# Patient Record
Sex: FEMALE | Race: WHITE | NOT HISPANIC OR LATINO | URBAN - METROPOLITAN AREA
[De-identification: names, ages, dates, MRNs, and addresses within clinical notes are randomized per-mention and may not be internally consistent; named-entity substitution may affect disease eponyms.]

---

## 2017-01-30 ENCOUNTER — INPATIENT (INPATIENT)
Facility: HOSPITAL | Age: 82
LOS: 8 days | Discharge: NURSING, REHAB OR LONG-TERM CARE | End: 2017-02-08
Attending: INTERNAL MEDICINE | Admitting: INTERNAL MEDICINE
Payer: MEDICARE

## 2017-01-30 VITALS
DIASTOLIC BLOOD PRESSURE: 83 MMHG | SYSTOLIC BLOOD PRESSURE: 137 MMHG | HEART RATE: 78 BPM | RESPIRATION RATE: 16 BRPM | TEMPERATURE: 98 F | HEIGHT: 64 IN | WEIGHT: 160.06 LBS | OXYGEN SATURATION: 100 %

## 2017-01-30 LAB
ALBUMIN SERPL ELPH-MCNC: 2.8 G/DL — LOW (ref 3.3–5)
ALP SERPL-CCNC: 87 U/L — SIGNIFICANT CHANGE UP (ref 40–120)
ALT FLD-CCNC: 25 U/L — SIGNIFICANT CHANGE UP (ref 12–78)
ANION GAP SERPL CALC-SCNC: 7 MMOL/L — SIGNIFICANT CHANGE UP (ref 5–17)
APPEARANCE UR: (no result)
APTT BLD: 26.6 SEC — LOW (ref 27.5–37.4)
AST SERPL-CCNC: 25 U/L — SIGNIFICANT CHANGE UP (ref 15–37)
BACTERIA # UR AUTO: (no result)
BASOPHILS # BLD AUTO: 0 K/UL — SIGNIFICANT CHANGE UP (ref 0–0.2)
BASOPHILS NFR BLD AUTO: 0.5 % — SIGNIFICANT CHANGE UP (ref 0–2)
BILIRUB SERPL-MCNC: 0.5 MG/DL — SIGNIFICANT CHANGE UP (ref 0.2–1.2)
BILIRUB UR-MCNC: NEGATIVE — SIGNIFICANT CHANGE UP
BUN SERPL-MCNC: 17 MG/DL — SIGNIFICANT CHANGE UP (ref 7–23)
CALCIUM SERPL-MCNC: 8.5 MG/DL — SIGNIFICANT CHANGE UP (ref 8.5–10.1)
CHLORIDE SERPL-SCNC: 96 MMOL/L — SIGNIFICANT CHANGE UP (ref 96–108)
CO2 SERPL-SCNC: 34 MMOL/L — HIGH (ref 22–31)
COLOR SPEC: YELLOW — SIGNIFICANT CHANGE UP
CREAT SERPL-MCNC: 0.63 MG/DL — SIGNIFICANT CHANGE UP (ref 0.5–1.3)
DIFF PNL FLD: (no result)
EOSINOPHIL # BLD AUTO: 0.2 K/UL — SIGNIFICANT CHANGE UP (ref 0–0.5)
EOSINOPHIL NFR BLD AUTO: 2.1 % — SIGNIFICANT CHANGE UP (ref 0–6)
EPI CELLS # UR: (no result)
GLUCOSE SERPL-MCNC: 107 MG/DL — HIGH (ref 70–99)
GLUCOSE UR QL: NEGATIVE MG/DL — SIGNIFICANT CHANGE UP
HCT VFR BLD CALC: 32.6 % — LOW (ref 34.5–45)
HGB BLD-MCNC: 10.6 G/DL — LOW (ref 11.5–15.5)
INR BLD: 1.09 RATIO — SIGNIFICANT CHANGE UP (ref 0.88–1.16)
KETONES UR-MCNC: (no result)
LACTATE SERPL-SCNC: 0.9 MMOL/L — SIGNIFICANT CHANGE UP (ref 0.7–2)
LEUKOCYTE ESTERASE UR-ACNC: (no result)
LYMPHOCYTES # BLD AUTO: 1.6 K/UL — SIGNIFICANT CHANGE UP (ref 1–3.3)
LYMPHOCYTES # BLD AUTO: 19.8 % — SIGNIFICANT CHANGE UP (ref 13–44)
MCHC RBC-ENTMCNC: 30.1 PG — SIGNIFICANT CHANGE UP (ref 27–34)
MCHC RBC-ENTMCNC: 32.7 GM/DL — SIGNIFICANT CHANGE UP (ref 32–36)
MCV RBC AUTO: 92 FL — SIGNIFICANT CHANGE UP (ref 80–100)
MONOCYTES # BLD AUTO: 0.7 K/UL — SIGNIFICANT CHANGE UP (ref 0–0.9)
MONOCYTES NFR BLD AUTO: 8.8 % — SIGNIFICANT CHANGE UP (ref 2–14)
NEUTROPHILS # BLD AUTO: 5.6 K/UL — SIGNIFICANT CHANGE UP (ref 1.8–7.4)
NEUTROPHILS NFR BLD AUTO: 68.7 % — SIGNIFICANT CHANGE UP (ref 43–77)
NITRITE UR-MCNC: POSITIVE
PH UR: 5 — SIGNIFICANT CHANGE UP (ref 4.8–8)
PLATELET # BLD AUTO: 231 K/UL — SIGNIFICANT CHANGE UP (ref 150–400)
POTASSIUM SERPL-MCNC: 3.4 MMOL/L — LOW (ref 3.5–5.3)
POTASSIUM SERPL-SCNC: 3.4 MMOL/L — LOW (ref 3.5–5.3)
PROT SERPL-MCNC: 5.9 GM/DL — LOW (ref 6–8.3)
PROT UR-MCNC: 30 MG/DL
PROTHROM AB SERPL-ACNC: 12 SEC — SIGNIFICANT CHANGE UP (ref 10–13.1)
RBC # BLD: 3.54 M/UL — LOW (ref 3.8–5.2)
RBC # FLD: 14.2 % — SIGNIFICANT CHANGE UP (ref 10.3–14.5)
RBC CASTS # UR COMP ASSIST: (no result) /HPF (ref 0–4)
SODIUM SERPL-SCNC: 137 MMOL/L — SIGNIFICANT CHANGE UP (ref 135–145)
SP GR SPEC: 1.02 — SIGNIFICANT CHANGE UP (ref 1.01–1.02)
UROBILINOGEN FLD QL: NEGATIVE MG/DL — SIGNIFICANT CHANGE UP
WBC # BLD: 8.2 K/UL — SIGNIFICANT CHANGE UP (ref 3.8–10.5)
WBC # FLD AUTO: 8.2 K/UL — SIGNIFICANT CHANGE UP (ref 3.8–10.5)
WBC UR QL: >50

## 2017-01-30 PROCEDURE — 99285 EMERGENCY DEPT VISIT HI MDM: CPT

## 2017-01-30 PROCEDURE — 71010: CPT | Mod: 26

## 2017-01-30 RX ORDER — VANCOMYCIN HCL 1 G
VIAL (EA) INTRAVENOUS
Qty: 0 | Refills: 0 | Status: DISCONTINUED | OUTPATIENT
Start: 2017-01-31 | End: 2017-01-31

## 2017-01-30 RX ORDER — VANCOMYCIN HCL 1 G
VIAL (EA) INTRAVENOUS
Qty: 0 | Refills: 0 | Status: DISCONTINUED | OUTPATIENT
Start: 2017-01-30 | End: 2017-01-30

## 2017-01-30 RX ORDER — CEFTRIAXONE 500 MG/1
1 INJECTION, POWDER, FOR SOLUTION INTRAMUSCULAR; INTRAVENOUS EVERY 24 HOURS
Qty: 0 | Refills: 0 | Status: DISCONTINUED | OUTPATIENT
Start: 2017-01-31 | End: 2017-02-01

## 2017-01-30 RX ORDER — ESCITALOPRAM OXALATE 10 MG/1
10 TABLET, FILM COATED ORAL DAILY
Qty: 0 | Refills: 0 | Status: DISCONTINUED | OUTPATIENT
Start: 2017-01-31 | End: 2017-02-08

## 2017-01-30 RX ORDER — HEPARIN SODIUM 5000 [USP'U]/ML
5000 INJECTION INTRAVENOUS; SUBCUTANEOUS EVERY 8 HOURS
Qty: 0 | Refills: 0 | Status: DISCONTINUED | OUTPATIENT
Start: 2017-01-30 | End: 2017-01-31

## 2017-01-30 RX ORDER — CEFTRIAXONE 500 MG/1
1 INJECTION, POWDER, FOR SOLUTION INTRAMUSCULAR; INTRAVENOUS ONCE
Qty: 0 | Refills: 0 | Status: COMPLETED | OUTPATIENT
Start: 2017-01-30 | End: 2017-01-30

## 2017-01-30 RX ORDER — CEFAZOLIN SODIUM 1 G
1000 VIAL (EA) INJECTION ONCE
Qty: 0 | Refills: 0 | Status: COMPLETED | OUTPATIENT
Start: 2017-01-30 | End: 2017-01-30

## 2017-01-30 RX ORDER — CARVEDILOL PHOSPHATE 80 MG/1
6.25 CAPSULE, EXTENDED RELEASE ORAL EVERY 12 HOURS
Qty: 0 | Refills: 0 | Status: DISCONTINUED | OUTPATIENT
Start: 2017-01-30 | End: 2017-02-08

## 2017-01-30 RX ORDER — ACETAMINOPHEN 500 MG
650 TABLET ORAL EVERY 6 HOURS
Qty: 0 | Refills: 0 | Status: DISCONTINUED | OUTPATIENT
Start: 2017-01-30 | End: 2017-02-08

## 2017-01-30 RX ORDER — CEFTRIAXONE 500 MG/1
INJECTION, POWDER, FOR SOLUTION INTRAMUSCULAR; INTRAVENOUS
Qty: 0 | Refills: 0 | Status: DISCONTINUED | OUTPATIENT
Start: 2017-01-30 | End: 2017-02-01

## 2017-01-30 RX ORDER — ASPIRIN/CALCIUM CARB/MAGNESIUM 324 MG
81 TABLET ORAL DAILY
Qty: 0 | Refills: 0 | Status: DISCONTINUED | OUTPATIENT
Start: 2017-01-31 | End: 2017-02-01

## 2017-01-30 RX ADMIN — Medication 100 MILLIGRAM(S): at 17:30

## 2017-01-30 NOTE — H&P ADULT - SKIN
detailed exam As per Extremities Exam ~Severe Bilateral Pitting LE edema with areas of notable weeping, and erythema. secondary to edema.

## 2017-01-30 NOTE — ED PROVIDER NOTE - CONSTITUTIONAL, MLM
normal... Well appearing, well nourished, awake, alert, oriented to person, place, time/situation and in no apparent distress. Afebrile. Nontoxic.

## 2017-01-30 NOTE — ED PROVIDER NOTE - MUSCULOSKELETAL, MLM
severe bilateral pitting LE edema with weeping. areas of erythema secondary to edema. right heel with denuded skin as daughter states she popped a blood blister

## 2017-01-30 NOTE — ED ADULT NURSE NOTE - CHIEF COMPLAINT
The patient is a 82y Female complaining of swelling of legs. The patient is a 82y Female complaining of swelling of legs.weeping edema b/l LE

## 2017-01-30 NOTE — ED PROVIDER NOTE - CARE PLAN
Principal Discharge DX:	Pitting edema  Secondary Diagnosis:	Cellulitis of lower extremity, unspecified laterality

## 2017-01-30 NOTE — ED PROVIDER NOTE - PMH
Anxiety    Bronchitis    HTN (hypertension) Anxiety    HTN (hypertension)    Hyperlipidemia, unspecified hyperlipidemia type    Moderate persistent asthma without complication    NSTEMI (non-ST elevated myocardial infarction)  12/5/2016

## 2017-01-30 NOTE — H&P ADULT - NSHPPHYSICALEXAM_GEN_ALL_CORE
T(C): 36.8, Max: 36.8 (01-30 @ 20:10)  HR: 78 (78 - 78)  BP: 137/83 (137/83 - 137/83)  RR: 16 (16 - 16)  SpO2: 100% (100% - 100%)  Wt(kg): --

## 2017-01-30 NOTE — ED ADULT NURSE REASSESSMENT NOTE - COMFORT CARE
wait time explained/side rails up/repositioned/treatment delay explained/plan of care explained/warm blanket provided

## 2017-01-30 NOTE — H&P ADULT - NSHPLABSRESULTS_GEN_ALL_CORE
10.6   8.2   )-----------( 231      ( 2017 17:17 )             32.6     2017 17:17    137    |  96     |  17     ----------------------------<  107    3.4     |  34     |  0.63     Ca    8.5        2017 17:17    TPro  5.9    /  Alb  2.8    /  TBili  0.5    /  DBili  x      /  AST  25     /  ALT  25     /  AlkPhos  87     2017 17:17    PT/INR - ( 2017 17:17 )   PT: 12.0 sec;   INR: 1.09 ratio         PTT - ( 2017 17:17 )  PTT:26.6 sec  CAPILLARY BLOOD GLUCOSE    LIVER FUNCTIONS - ( 2017 17:17 )  Alb: 2.8 g/dL / Pro: 5.9 gm/dL / ALK PHOS: 87 U/L / ALT: 25 U/L / AST: 25 U/L / GGT: x           Urinalysis Basic - ( 2017 21:10 )    Color: Yellow / Appearance: very cloudy / S.025 / pH: x  Gluc: x / Ketone: Small  / Bili: Negative / Urobili: Negative mg/dL   Blood: x / Protein: 30 mg/dL / Nitrite: Positive   Leuk Esterase: Moderate / RBC: 6-10 /HPF / WBC >50   Sq Epi: x / Non Sq Epi: Moderate / Bacteria: Many

## 2017-01-30 NOTE — H&P ADULT - HISTORY OF PRESENT ILLNESS
81 y/o F PMHx significant for HTN, HLD, moderate persistent asthma, with recent hospitalization in 12/2016 where the patient was found to have a RSV Bronchiolitis with hypercapneic and hypoxemic respiratory failure requiring endotracheal intubation c/b a NSTEMI who was referred to the ED today from Dr. Ann' office for further evaluation of progressively worsening B/L LE swelling and erythema concerning for purulent cellulitis.

## 2017-01-30 NOTE — H&P ADULT - EXTREMITIES COMMENTS
~Severe Bilateral Pitting LE edema with areas of notable weeping, and erythema. secondary to edema.   ~Right Heel with area of denuded skin and surrounding erythema

## 2017-01-30 NOTE — ED PROVIDER NOTE - NS ED MD SCRIBE ATTENDING SCRIBE SECTIONS
PROGRESS NOTE/HISTORY OF PRESENT ILLNESS/REVIEW OF SYSTEMS/PHYSICAL EXAM/RESULTS/PAST MEDICAL/SURGICAL/SOCIAL HISTORY

## 2017-01-30 NOTE — H&P ADULT - PMH
Anxiety    HTN (hypertension)    Hyperlipidemia, unspecified hyperlipidemia type    Moderate persistent asthma without complication    NSTEMI (non-ST elevated myocardial infarction)  12/5/2016

## 2017-01-30 NOTE — ED PROVIDER NOTE - OBJECTIVE STATEMENT
83 y/o F with hx of anxiety, HTN on carvedilol, on 81mg ASA and nebulizer PRN sent from PMD Dr Castellano for evaluation of increasingg bilateral LE swelling and cellulitis for antibiotics and consult with vascular surgeon evaluation. Pt was admitted to  for bronchitis a few weeks ago and has since then been having increased LE swelling. Denies cough, SOB, CP, fevers.

## 2017-01-31 DIAGNOSIS — I82.4Z1 ACUTE EMBOLISM AND THROMBOSIS OF UNSPECIFIED DEEP VEINS OF RIGHT DISTAL LOWER EXTREMITY: ICD-10-CM

## 2017-01-31 DIAGNOSIS — L03.119 CELLULITIS OF UNSPECIFIED PART OF LIMB: ICD-10-CM

## 2017-01-31 DIAGNOSIS — J45.40 MODERATE PERSISTENT ASTHMA, UNCOMPLICATED: ICD-10-CM

## 2017-01-31 DIAGNOSIS — I10 ESSENTIAL (PRIMARY) HYPERTENSION: ICD-10-CM

## 2017-01-31 DIAGNOSIS — F41.9 ANXIETY DISORDER, UNSPECIFIED: ICD-10-CM

## 2017-01-31 DIAGNOSIS — T14.8 OTHER INJURY OF UNSPECIFIED BODY REGION: ICD-10-CM

## 2017-01-31 LAB
ALBUMIN SERPL ELPH-MCNC: 2.3 G/DL — LOW (ref 3.3–5)
ALP SERPL-CCNC: 70 U/L — SIGNIFICANT CHANGE UP (ref 40–120)
ALT FLD-CCNC: 18 U/L — SIGNIFICANT CHANGE UP (ref 12–78)
ANION GAP SERPL CALC-SCNC: 7 MMOL/L — SIGNIFICANT CHANGE UP (ref 5–17)
AST SERPL-CCNC: 20 U/L — SIGNIFICANT CHANGE UP (ref 15–37)
BILIRUB SERPL-MCNC: 0.5 MG/DL — SIGNIFICANT CHANGE UP (ref 0.2–1.2)
BUN SERPL-MCNC: 16 MG/DL — SIGNIFICANT CHANGE UP (ref 7–23)
CALCIUM SERPL-MCNC: 7.9 MG/DL — LOW (ref 8.5–10.1)
CHLORIDE SERPL-SCNC: 98 MMOL/L — SIGNIFICANT CHANGE UP (ref 96–108)
CO2 SERPL-SCNC: 32 MMOL/L — HIGH (ref 22–31)
CREAT SERPL-MCNC: 0.55 MG/DL — SIGNIFICANT CHANGE UP (ref 0.5–1.3)
GLUCOSE SERPL-MCNC: 122 MG/DL — HIGH (ref 70–99)
HCT VFR BLD CALC: 33.5 % — LOW (ref 34.5–45)
HGB BLD-MCNC: 11.1 G/DL — LOW (ref 11.5–15.5)
MAGNESIUM SERPL-MCNC: 1.7 MG/DL — LOW (ref 1.8–2.4)
MCHC RBC-ENTMCNC: 30.8 PG — SIGNIFICANT CHANGE UP (ref 27–34)
MCHC RBC-ENTMCNC: 33 GM/DL — SIGNIFICANT CHANGE UP (ref 32–36)
MCV RBC AUTO: 93.2 FL — SIGNIFICANT CHANGE UP (ref 80–100)
NT-PROBNP SERPL-SCNC: 3163 PG/ML — HIGH (ref 0–450)
PLATELET # BLD AUTO: 229 K/UL — SIGNIFICANT CHANGE UP (ref 150–400)
POTASSIUM SERPL-MCNC: 3.3 MMOL/L — LOW (ref 3.5–5.3)
POTASSIUM SERPL-SCNC: 3.3 MMOL/L — LOW (ref 3.5–5.3)
PROT SERPL-MCNC: 4.9 GM/DL — LOW (ref 6–8.3)
RBC # BLD: 3.59 M/UL — LOW (ref 3.8–5.2)
RBC # FLD: 14.4 % — SIGNIFICANT CHANGE UP (ref 10.3–14.5)
SODIUM SERPL-SCNC: 137 MMOL/L — SIGNIFICANT CHANGE UP (ref 135–145)
WBC # BLD: 17.6 K/UL — HIGH (ref 3.8–10.5)
WBC # FLD AUTO: 17.6 K/UL — HIGH (ref 3.8–10.5)

## 2017-01-31 PROCEDURE — 93970 EXTREMITY STUDY: CPT | Mod: 26

## 2017-01-31 RX ORDER — ALBUTEROL 90 UG/1
2 AEROSOL, METERED ORAL EVERY 6 HOURS
Qty: 0 | Refills: 0 | Status: DISCONTINUED | OUTPATIENT
Start: 2017-01-31 | End: 2017-02-08

## 2017-01-31 RX ORDER — POTASSIUM CHLORIDE 20 MEQ
40 PACKET (EA) ORAL ONCE
Qty: 0 | Refills: 0 | Status: COMPLETED | OUTPATIENT
Start: 2017-01-31 | End: 2017-01-31

## 2017-01-31 RX ORDER — ALPRAZOLAM 0.25 MG
0.12 TABLET ORAL EVERY 8 HOURS
Qty: 0 | Refills: 0 | Status: DISCONTINUED | OUTPATIENT
Start: 2017-01-31 | End: 2017-02-06

## 2017-01-31 RX ORDER — VANCOMYCIN HCL 1 G
1000 VIAL (EA) INTRAVENOUS EVERY 12 HOURS
Qty: 0 | Refills: 0 | Status: DISCONTINUED | OUTPATIENT
Start: 2017-01-31 | End: 2017-01-31

## 2017-01-31 RX ORDER — ENOXAPARIN SODIUM 100 MG/ML
80 INJECTION SUBCUTANEOUS EVERY 12 HOURS
Qty: 0 | Refills: 0 | Status: DISCONTINUED | OUTPATIENT
Start: 2017-01-31 | End: 2017-02-01

## 2017-01-31 RX ORDER — VANCOMYCIN HCL 1 G
750 VIAL (EA) INTRAVENOUS EVERY 12 HOURS
Qty: 0 | Refills: 0 | Status: DISCONTINUED | OUTPATIENT
Start: 2017-01-31 | End: 2017-02-02

## 2017-01-31 RX ORDER — IPRATROPIUM/ALBUTEROL SULFATE 18-103MCG
3 AEROSOL WITH ADAPTER (GRAM) INHALATION ONCE
Qty: 0 | Refills: 0 | Status: COMPLETED | OUTPATIENT
Start: 2017-01-31 | End: 2017-01-31

## 2017-01-31 RX ORDER — VANCOMYCIN HCL 1 G
1000 VIAL (EA) INTRAVENOUS ONCE
Qty: 0 | Refills: 0 | Status: COMPLETED | OUTPATIENT
Start: 2017-01-31 | End: 2017-01-31

## 2017-01-31 RX ADMIN — Medication 40 MILLIEQUIVALENT(S): at 11:02

## 2017-01-31 RX ADMIN — ESCITALOPRAM OXALATE 10 MILLIGRAM(S): 10 TABLET, FILM COATED ORAL at 11:06

## 2017-01-31 RX ADMIN — CEFTRIAXONE 100 GRAM(S): 500 INJECTION, POWDER, FOR SOLUTION INTRAMUSCULAR; INTRAVENOUS at 23:46

## 2017-01-31 RX ADMIN — Medication 150 MILLIGRAM(S): at 20:11

## 2017-01-31 RX ADMIN — CARVEDILOL PHOSPHATE 6.25 MILLIGRAM(S): 80 CAPSULE, EXTENDED RELEASE ORAL at 05:07

## 2017-01-31 RX ADMIN — Medication 250 MILLIGRAM(S): at 03:19

## 2017-01-31 RX ADMIN — Medication 81 MILLIGRAM(S): at 11:05

## 2017-01-31 RX ADMIN — Medication 0.12 MILLIGRAM(S): at 17:36

## 2017-01-31 RX ADMIN — CARVEDILOL PHOSPHATE 6.25 MILLIGRAM(S): 80 CAPSULE, EXTENDED RELEASE ORAL at 17:37

## 2017-01-31 RX ADMIN — CEFTRIAXONE 100 GRAM(S): 500 INJECTION, POWDER, FOR SOLUTION INTRAMUSCULAR; INTRAVENOUS at 01:03

## 2017-01-31 RX ADMIN — ENOXAPARIN SODIUM 80 MILLIGRAM(S): 100 INJECTION SUBCUTANEOUS at 17:37

## 2017-01-31 RX ADMIN — ENOXAPARIN SODIUM 80 MILLIGRAM(S): 100 INJECTION SUBCUTANEOUS at 05:07

## 2017-01-31 NOTE — CONSULT NOTE ADULT - SUBJECTIVE AND OBJECTIVE BOX
Outpatient Providers	PCP/Pulmonary: Dr. Castellano	  	PCP: Dr. Kennedy Pulmonary: Dr. Castellano	    Language:  · Patient/Family of Limited English Proficiency	No	      History of Present Illness:  Chief Complaint/Reason for Admission: B/L Lower Extremity Swelling	  History of Present Illness: 	  81 y/o F PMHx significant for HTN, HLD, moderate persistent asthma, with recent hospitalization in 2016 where the patient was found to have a RSV Bronchiolitis with hypercapneic and hypoxemic respiratory failure requiring endotracheal intubation c/b a NSTEMI who was referred to the ED today from Dr. Ann' office for further evaluation of progressively worsening B/L LE swelling and erythema concerning for purulent cellulitis.    Review of Systems:  Other Review of Systems: All other review of systems negative, except as noted in HPI	      Allergies and Intolerances:        Allergies:  	penicillin: Drug, Anaphylaxis  	penicillins: Drug Category, Unknown, Originally Entered as [Unknown] reaction to [Penicillins]    Home Medications:   * Outpatient Medication Status not yet specified    Patient History:   Past Medical History:  Anxiety    HTN (hypertension)    Hyperlipidemia, unspecified hyperlipidemia type    Moderate persistent asthma without complication    NSTEMI (non-ST elevated myocardial infarction)  2016.    Past Surgical History:  No significant past surgical history.    Family History:  No pertinent family history in first degree relatives.    Social History:  Social History (marital status, living situation, occupation, tobacco use, alcohol and drug use, and sexual history): Patient lives at home with her daughter. Former smoker but quit age 40. No ETOH or drug use.	    Tobacco Screening:  · Core Measure Site	No	  · Has the patient used tobacco in the past 30 days?	No	    Risk Assessment:   Present on Admission:  Deep Venous Thrombosis	no	  Pulmonary Embolus	no	  Urinary Catheter	no	  Central Venous Catheter/PICC Line	no	  Surgical Site Incision	no	  Pressure Ulcer(s)	no	    Heart Failure:  Does this patient have a history of or has been diagnosed with heart failure? no.      Physical Exam:  Physical Exam: T(C): 36.8, Max: 36.8 (-30 @ 20:10) HR: 78 (78 - 78) BP: 137/83 (137/83 - 137/83) RR: 16 (16 - 16) SpO2: 100% (100% - 100%) Wt(kg): --	    Physical Exam:  · Constitutional	detailed exam	  · Constitutional Details	no distress	  · Eyes	detailed exam	  · Eyes Details	PERRL; EOMI	  · Neck	detailed exam	  · Neck Details	supple	  · Breasts	not examined	  · Respiratory	detailed exam	  · Respiratory Details	airway patent; good air movement	  · Cardiovascular	detailed exam	  · Cardiovascular Details	regular rate and rhythm	  · Gastrointestinal	detailed exam	  · GI Normal	soft; nontender; no distention	  · Genitourinary	patient refused	  · Rectal	patient refused	  · Extremities	detailed exam	  · Extremities Details	pedal edema	  · Pedal Edema Severity	4+	  · Pedal Edema Type	pitting	  · Extremities Comments	~Severe Bilateral Pitting LE edema with areas of notable weeping, and erythema. secondary to edema.  ~Right Heel with area of denuded skin and surrounding erythema; 2+ femoral pulses bilaterally and both feet pink warm and well perfused	  · Neurological	detailed exam	  · Neurological Details	alert and oriented x 3	  · Skin	detailed exam	  · Skin Details	As per Extremities Exam ~Severe Bilateral Pitting LE edema with areas of notable weeping, and erythema. secondary to edema.	      Labs and Results:  Labs, Radiology, Cardiology, and Other Results: 10.6  8.2   )-----------( 231      ( 2017 17:17 )            32.6   2017 17:17  137    |  96     |  17    ----------------------------<  107   3.4     |  34     |  0.63   Ca    8.5        2017 17:17  TPro  5.9    /  Alb  2.8    /  TBili  0.5    /  DBili  x      /  AST  25     /  ALT  25     /  AlkPhos  87     2017 17:17  PT/INR - ( 2017 17:17 )   PT: 12.0 sec;   INR: 1.09 ratio      PTT - ( 2017 17:17 )  PTT:26.6 sec CAPILLARY BLOOD GLUCOSE  LIVER FUNCTIONS - ( 2017 17:17 ) Alb: 2.8 g/dL / Pro: 5.9 gm/dL / ALK PHOS: 87 U/L / ALT: 25 U/L / AST: 25 U/L / GGT: x         Urinalysis Basic - ( 2017 21:10 )  Color: Yellow / Appearance: very cloudy / S.025 / pH: x Gluc: x / Ketone: Small  / Bili: Negative / Urobili: Negative mg/dL  Blood: x / Protein: 30 mg/dL / Nitrite: Positive  Leuk Esterase: Moderate / RBC: 6-10 /HPF / WBC >50  Sq Epi: x / Non Sq Epi: Moderate / Bacteria: Many

## 2017-01-31 NOTE — PROGRESS NOTE ADULT - SUBJECTIVE AND OBJECTIVE BOX
CHIEF COMPLAINT: progressively worsening B/L LE swelling and erythema concerning for purulent cellulitis.            REVIEW OF SYSTEMS:    CONSTITUTIONAL: No weakness, fevers or chills  EYES/ENT: No visual changes;  No vertigo or throat pain   NECK: No pain or stiffness  RESPIRATORY: No cough, wheezing, hemoptysis; No shortness of breath  CARDIOVASCULAR: No chest pain or palpitations  GASTROINTESTINAL: No abdominal or epigastric pain. No nausea, vomiting, or hematemesis; No diarrhea or constipation. No melena or hematochezia.  GENITOURINARY: No dysuria, frequency or hematuria  NEUROLOGICAL: No numbness or weakness  SKIN: No itching, burning, rashes, or lesions   All other review of systems is negative unless indicated above    Vital Signs Last 24 Hrs  T(C): 36.2, Max: 36.8 ( @ 20:10)  T(F): 97.2, Max: 98.3 ( @ 20:10)  HR: 93 (65 - 93)  BP: 129/58 (109/66 - 137/83)  BP(mean): --  RR: 20 (16 - 20)  SpO2: 93% (93% - 100%)          I&O's Summary    I & Os for current day (as of 2017 15:20)  =============================================  IN: 0 ml / OUT: 1 ml / NET: -1 ml      CAPILLARY BLOOD GLUCOSE      PHYSICAL EXAM:    Constitutional: NAD, awake and alert, well-developed  HEENT: PERR, EOMI, Normal Hearing, MMM  Neck: Soft and supple, No LAD, No JVD  Respiratory: Breath sounds are clear bilaterally, No wheezing, rales or rhonchi  Cardiovascular: S1 and S2, regular rate and rhythm, no Murmurs, gallops or rubs  Gastrointestinal: Bowel Sounds present, soft, nontender, nondistended, no guarding, no rebound  Extremities: bilateral pitting edema to BLE  Vascular: 2+ peripheral pulses  Neurological: A/O x 3, no focal deficits  Musculoskeletal: 5/5 strength b/l upper and lower extremities  Skin: No rashes    MEDICATIONS:  MEDICATIONS  (STANDING):  escitalopram 10milliGRAM(s) Oral daily  carvedilol 6.25milliGRAM(s) Oral every 12 hours  cefTRIAXone   IVPB  IV Intermittent   cefTRIAXone   IVPB 1Gram(s) IV Intermittent every 24 hours  aspirin  chewable 81milliGRAM(s) Oral daily  vancomycin  IVPB  IV Intermittent   vancomycin  IVPB 1000milliGRAM(s) IV Intermittent every 12 hours  enoxaparin Injectable 80milliGRAM(s) SubCutaneous every 12 hours      LABS: All Labs Reviewed:                        10.6   8.2   )-----------( 231      ( 2017 17:17 )             32.6     2017 06:55    137    |  98     |  16     ----------------------------<  122    3.3     |  32     |  0.55     Ca    7.9        2017 06:55  Mg     1.7       2017 06:55    TPro  4.9    /  Alb  2.3    /  TBili  0.5    /  DBili  x      /  AST  20     /  ALT  18     /  AlkPhos  70     2017 06:55    PT/INR - ( 2017 17:17 )   PT: 12.0 sec;   INR: 1.09 ratio         PTT - ( 2017 17:17 )  PTT:26.6 sec    Urinalysis Basic - ( 2017 21:10 )    Color: Yellow / Appearance: very cloudy / S.025 / pH: x  Gluc: x / Ketone: Small  / Bili: Negative / Urobili: Negative mg/dL   Blood: x / Protein: 30 mg/dL / Nitrite: Positive   Leuk Esterase: Moderate / RBC: 6-10 /HPF / WBC >50   Sq Epi: x / Non Sq Epi: Moderate / Bacteria: Many              EXAM:  US DPLX LWR EXT VEINS COMPL BI                            PROCEDURE DATE:  2017        INTERPRETATION:  BILATERAL LOWER EXTREMITY VENOUS ULTRASOUND    INDICATION:  Bilateral lower extremity swelling.    TECHNIQUE: Grayscale, color and spectral Doppler evaluation of the   bilateral lower extremities with graded compression maneuvers was   performed.    COMPARISON: None.     FINDINGS:   Normal waveforms and compressibility are demonstrated in the common   femoral, femoral, and popliteal veins of the left lower extremity. There   is normal waveform and compressibility in the right common femoral and   femoral veins. There is partially occlusive thrombus in the right   popliteal vein.    There is right calf edema.    IMPRESSION:    Positive for DVT in the right popliteal vein.    Negative for DVT in the left leg.    Urinalysis Basic - ( 2017 21:10 )

## 2017-01-31 NOTE — CONSULT NOTE ADULT - ASSESSMENT
bilateral lower extremity edema with R DVT; previous rectus sheath hematoma awaiting CT of abdomen to determine resolution    continue antibiotics    treatment for lymphedema as out patient

## 2017-01-31 NOTE — PROGRESS NOTE ADULT - PROBLEM SELECTOR PLAN 2
~cont. LMWH  ~patient with history of rectal sheath hematoma on previous admission from heparin VTE prophylaxis  - will check CT if hematoma has resolved  - Dr Knight spoke with daughter about oral anticoagulation vs IVC filter given patient history of spontaneous bleed on heparin SQ ~cont. LMWH  ~patient with history of rectal sheath hematoma on previous admission from heparin VTE prophylaxis  - will check CT if hematoma has resolved  -Plan for D/C Lovenox if hematoma present on CT  - Dr Knight spoke with daughter about oral anticoagulation vs IVC filter given patient history of spontaneous bleed on heparin SQ

## 2017-01-31 NOTE — PROGRESS NOTE ADULT - ASSESSMENT
83 y/o F PMHx significant for HTN, HLD, moderate persistent asthma, with recent hospitalization in 12/2016 where the patient was found to have a RSV Bronchiolitis with hypercapneic and hypoxemic respiratory failure requiring endotracheal intubation c/b a NSTEMI who was referred to the ED today from Dr. Ann' office for further evaluation of progressively worsening B/L LE swelling and erythema concerning for purulent cellulitis.  Workup showed Right popliteal DVT- patient was then started on lovenox therapeutic dose.

## 2017-01-31 NOTE — CONSULT NOTE ADULT - SUBJECTIVE AND OBJECTIVE BOX
Patient is a 81 y/o old  Female who presents with a chief complaint of cellulitis (2017 01:53)    HPI:  81 y/o Female with h/o  HTN, HLD, CAD s/p NSTEMI, moderate asthma, recent hospitalization in 2016 for RSV Bronchiolitis with hypercapneic and hypoxemic respiratory failure that required endotracheal intubation was admitted on  progressively worsening B/L LE swelling and erythema. (2017 21:57)      PMH: as above  PSH: as above  Meds: per reconcilation sheet, noted below  MEDICATIONS  (STANDING):  escitalopram 10milliGRAM(s) Oral daily  carvedilol 6.25milliGRAM(s) Oral every 12 hours  cefTRIAXone   IVPB  IV Intermittent   cefTRIAXone   IVPB 1Gram(s) IV Intermittent every 24 hours  aspirin  chewable 81milliGRAM(s) Oral daily  enoxaparin Injectable 80milliGRAM(s) SubCutaneous every 12 hours  vancomycin  IVPB 750milliGRAM(s) IV Intermittent every 12 hours    MEDICATIONS  (PRN):  acetaminophen   Tablet 650milliGRAM(s) Oral every 6 hours PRN For Temp greater than 38 C (100.4 F)    Allergies    penicillin (Anaphylaxis)  penicillins (Unknown)    Intolerances      Social: no smoking, no alcohol, no illegal drugs; no recent travel, no exposure to TB  FAMILY HISTORY:  No pertinent family history in first degree relatives    ROS: the patient denies fever, no chills, no HA, no dizziness, no sore throat, no blurry vision, no CP, no palpitations, no SOB, no cough, no abdominal pain, no diarrhea, no N/V, no dysuria, has leg pain, no claudication, no rash, no joint aches, no rectal pain or bleeding, no night sweats  Vital Signs Last 24 Hrs  T(C): 36.2, Max: 36.8 ( @ 20:10)  T(F): 97.2, Max: 98.3 ( @ 20:10)  HR: 93 (65 - 93)  BP: 129/58 (109/66 - 137/83)  BP(mean): --  RR: 20 (16 - 20)  SpO2: 93% (93% - 100%)  Daily Height in cm: 162.56 (2017 20:10)    Daily   Constitutional: frail looking  HEENT: NC/AT, EOMI, PERRLA  Neck: supple  Back: no tenderness  Respiratory: clear  Cardiovascular: S1S2 regular, no murmurs  Abdomen: soft, not tender, not distended, positive BS  Genitourinary: deferred  Rectal: deferred  Musculoskeletal: no muscle tenderness, no joint swelling or tenderness  Extremities: has pedal edema and tenderness  Neurological: AxOx3, moving all extremities, no focal deficits  Skin: no rashes                          10.6   8.2   )-----------( 231      ( 2017 17:17 )             32.6     2017 06:55    137    |  98     |  16     ----------------------------<  122    3.3     |  32     |  0.55     Ca    7.9        2017 06:55  Mg     1.7       2017 06:55    TPro  4.9    /  Alb  2.3    /  TBili  0.5    /  DBili  x      /  AST  20     /  ALT  18     /  AlkPhos  70     2017 06:55     LIVER FUNCTIONS - ( 2017 06:55 )  Alb: 2.3 g/dL / Pro: 4.9 gm/dL / ALK PHOS: 70 U/L / ALT: 18 U/L / AST: 20 U/L / GGT: x           Urinalysis Basic - ( 2017 21:10 )    Color: Yellow / Appearance: very cloudy / S.025 / pH: x  Gluc: x / Ketone: Small  / Bili: Negative / Urobili: Negative mg/dL   Blood: x / Protein: 30 mg/dL / Nitrite: Positive   Leuk Esterase: Moderate / RBC: 6-10 /HPF / WBC >50   Sq Epi: x / Non Sq Epi: Moderate / Bacteria: Many    [Patient is a 82y old  Female who presents with a chief complaint of cellulitis (2017 01:53)    HPI:  81 y/o F PMHx significant for HTN, HLD, moderate persistent asthma, with recent hospitalization in 2016 where the patient was found to have a RSV Bronchiolitis with hypercapneic and hypoxemic respiratory failure requiring endotracheal intubation c/b a NSTEMI who was referred to the ED today from Dr. Ann' office for further evaluation of progressively worsening B/L LE swelling and erythema concerning for purulent cellulitis. (2017 21:57)      PMH: as above  PSH: as above  Meds: per reconcilation sheet, noted below  MEDICATIONS  (STANDING):  escitalopram 10milliGRAM(s) Oral daily  carvedilol 6.25milliGRAM(s) Oral every 12 hours  cefTRIAXone   IVPB  IV Intermittent   cefTRIAXone   IVPB 1Gram(s) IV Intermittent every 24 hours  aspirin  chewable 81milliGRAM(s) Oral daily  enoxaparin Injectable 80milliGRAM(s) SubCutaneous every 12 hours  vancomycin  IVPB 750milliGRAM(s) IV Intermittent every 12 hours    MEDICATIONS  (PRN):  acetaminophen   Tablet 650milliGRAM(s) Oral every 6 hours PRN For Temp greater than 38 C (100.4 F)    Allergies    penicillin (Anaphylaxis)  penicillins (Unknown)    Intolerances      Social: no smoking, no alcohol, no illegal drugs; no recent travel, no exposure to TB  FAMILY HISTORY:  No pertinent family history in first degree relatives    ROS: the patient denies fever, no chills, no HA, no dizziness, no sore throat, no blurry vision, no CP, no palpitations, no SOB, no cough, no abdominal pain, no diarrhea, no N/V, no dysuria, no leg pain, no claudication, no rash, no joint aches, no rectal pain or bleeding, no night sweats  Vital Signs Last 24 Hrs  T(C): 36.2, Max: 36.8 ( @ 20:10)  T(F): 97.2, Max: 98.3 ( @ 20:10)  HR: 93 (65 - 93)  BP: 129/58 (109/66 - 137/83)  BP(mean): --  RR: 20 (16 - 20)  SpO2: 93% (93% - 100%)  Daily Height in cm: 162.56 (2017 20:10)    Daily   Constitutional: frail looking  HEENT: NC/AT, EOMI, PERRLA  Neck: supple  Back: no tenderness  Respiratory: clear  Cardiovascular: S1S2 regular, no murmurs  Abdomen: soft, not tender, not distended, positive BS  Genitourinary: deferred  Rectal: deferred  Musculoskeletal: no muscle tenderness, no joint swelling or tenderness  Extremities: no pedal edema  Neurological: AxOx3, moving all extremities, no focal deficits  Skin: no rashes                          10.6   8.2   )-----------( 231      ( 2017 17:17 )             32.6     2017 06:55    137    |  98     |  16     ----------------------------<  122    3.3     |  32     |  0.55     Ca    7.9        2017 06:55  Mg     1.7       2017 06:55    TPro  4.9    /  Alb  2.3    /  TBili  0.5    /  DBili  x      /  AST  20     /  ALT  18     /  AlkPhos  70     2017 06:55     LIVER FUNCTIONS - ( 2017 06:55 )  Alb: 2.3 g/dL / Pro: 4.9 gm/dL / ALK PHOS: 70 U/L / ALT: 18 U/L / AST: 20 U/L / GGT: x           Urinalysis Basic - ( 2017 21:10 )    Color: Yellow / Appearance: very cloudy / S.025 / pH: x .Gluc: x / Ketone: Small  / Bili: Negative / Urobili: Negative mg/dL   Blood: x / Protein: 30 mg/dL / Nitrite: Positive   Leuk Esterase: Moderate / RBC: 6-10 /HPF / WBC >50   Sq Epi: x / Non Sq Epi: Moderate / Bacteria: Many      Radiology:    Venous US: Positive for DVT in the right popliteal vein.    Negative for DVT in the left leg.      CXR: Clear lungs. No effusions or pneumothorax. Patient is a 83 y/o old  Female who presents with a chief complaint of cellulitis (2017 01:53)    HPI:  83 y/o Female with h/o  HTN, HLD, CAD s/p NSTEMI, moderate asthma, recent hospitalization in 2016 for RSV Bronchiolitis with hypercapneic and hypoxemic respiratory failure that required endotracheal intubation was admitted on  progressively worsening B/L LE swelling and erythema. (2017 21:57)      PMH: as above  PSH: as above  Meds: per reconcilation sheet, noted below  MEDICATIONS  (STANDING):  escitalopram 10milliGRAM(s) Oral daily  carvedilol 6.25milliGRAM(s) Oral every 12 hours  cefTRIAXone   IVPB  IV Intermittent   cefTRIAXone   IVPB 1Gram(s) IV Intermittent every 24 hours  aspirin  chewable 81milliGRAM(s) Oral daily  enoxaparin Injectable 80milliGRAM(s) SubCutaneous every 12 hours  vancomycin  IVPB 750milliGRAM(s) IV Intermittent every 12 hours    MEDICATIONS  (PRN):  acetaminophen   Tablet 650milliGRAM(s) Oral every 6 hours PRN For Temp greater than 38 C (100.4 F)    Allergies    penicillin (Anaphylaxis)  penicillins (Unknown)    Intolerances      Social: no smoking, no alcohol, no illegal drugs; no recent travel, no exposure to TB  FAMILY HISTORY:  No pertinent family history in first degree relatives    ROS: the patient denies fever, no chills, no HA, no dizziness, no sore throat, no blurry vision, no CP, no palpitations, no SOB, no cough, no abdominal pain, no diarrhea, no N/V, no dysuria, has leg pain, no claudication, no rash, no joint aches, no rectal pain or bleeding, no night sweats  Vital Signs Last 24 Hrs  T(C): 36.2, Max: 36.8 ( @ 20:10)  T(F): 97.2, Max: 98.3 ( @ 20:10)  HR: 93 (65 - 93)  BP: 129/58 (109/66 - 137/83)  BP(mean): --  RR: 20 (16 - 20)  SpO2: 93% (93% - 100%)  Daily Height in cm: 162.56 (2017 20:10)    Daily   Constitutional: frail looking  HEENT: NC/AT, EOMI, PERRLA  Neck: supple  Back: no tenderness  Respiratory: clear  Cardiovascular: S1S2 regular, no murmurs  Abdomen: soft, not tender, not distended, positive BS  Genitourinary: deferred  Rectal: deferred  Musculoskeletal: no muscle tenderness, no joint swelling or tenderness  Extremities: has pedal edema and tenderness; Left anterior shin erythema, edema and broken skin with scant yellow discharge  Neurological: AxOx3, moving all extremities, no focal deficits  Skin: leg rashe                          10.6   8.2   )-----------( 231      ( 2017 17:17 )             32.6     2017 06:55    137    |  98     |  16     ----------------------------<  122    3.3     |  32     |  0.55     Ca    7.9        2017 06:55  Mg     1.7       2017 06:55    TPro  4.9    /  Alb  2.3    /  TBili  0.5    /  DBili  x      /  AST  20     /  ALT  18     /  AlkPhos  70     2017 06:55     LIVER FUNCTIONS - ( 2017 06:55 )  Alb: 2.3 g/dL / Pro: 4.9 gm/dL / ALK PHOS: 70 U/L / ALT: 18 U/L / AST: 20 U/L / GGT: x           Urinalysis Basic - ( 2017 21:10 )    Color: Yellow / Appearance: very cloudy / S.025 / pH: x  Gluc: x / Ketone: Small  / Bili: Negative / Urobili: Negative mg/dL   Blood: x / Protein: 30 mg/dL / Nitrite: Positive   Leuk Esterase: Moderate / RBC: 6-10 /HPF / WBC >50   Sq Epi: x / Non Sq Epi: Moderate / Bacteria: Many    [Patient is a 82y old  Female who presents with a chief complaint of cellulitis (2017 01:53)    HPI:  83 y/o F PMHx significant for HTN, HLD, moderate persistent asthma, with recent hospitalization in 2016 where the patient was found to have a RSV Bronchiolitis with hypercapneic and hypoxemic respiratory failure requiring endotracheal intubation c/b a NSTEMI who was referred to the ED today from Dr. Ann' office for further evaluation of progressively worsening B/L LE swelling and erythema concerning for purulent cellulitis. (2017 21:57)      PMH: as above  PSH: as above  Meds: per reconcilation sheet, noted below  MEDICATIONS  (STANDING):  escitalopram 10milliGRAM(s) Oral daily  carvedilol 6.25milliGRAM(s) Oral every 12 hours  cefTRIAXone   IVPB  IV Intermittent   cefTRIAXone   IVPB 1Gram(s) IV Intermittent every 24 hours  aspirin  chewable 81milliGRAM(s) Oral daily  enoxaparin Injectable 80milliGRAM(s) SubCutaneous every 12 hours  vancomycin  IVPB 750milliGRAM(s) IV Intermittent every 12 hours    MEDICATIONS  (PRN):  acetaminophen   Tablet 650milliGRAM(s) Oral every 6 hours PRN For Temp greater than 38 C (100.4 F)    Allergies    penicillin (Anaphylaxis)  penicillins (Unknown)    Intolerances      Social: no smoking, no alcohol, no illegal drugs; no recent travel, no exposure to TB  FAMILY HISTORY:  No pertinent family history in first degree relatives    ROS: the patient denies fever, no chills, no HA, no dizziness, no sore throat, no blurry vision, no CP, no palpitations, no SOB, no cough, no abdominal pain, no diarrhea, no N/V, no dysuria, no leg pain, no claudication, no rash, no joint aches, no rectal pain or bleeding, no night sweats  Vital Signs Last 24 Hrs  T(C): 36.2, Max: 36.8 ( @ 20:10)  T(F): 97.2, Max: 98.3 ( @ 20:10)  HR: 93 (65 - 93)  BP: 129/58 (109/66 - 137/83)  BP(mean): --  RR: 20 (16 - 20)  SpO2: 93% (93% - 100%)  Daily Height in cm: 162.56 (2017 20:10)    Daily   Constitutional: frail looking  HEENT: NC/AT, EOMI, PERRLA  Neck: supple  Back: no tenderness  Respiratory: clear  Cardiovascular: S1S2 regular, no murmurs  Abdomen: soft, not tender, not distended, positive BS  Genitourinary: deferred  Rectal: deferred  Musculoskeletal: no muscle tenderness, no joint swelling or tenderness  Extremities: no pedal edema  Neurological: AxOx3, moving all extremities, no focal deficits  Skin: no rashes                          10.6   8.2   )-----------( 231      ( 2017 17:17 )             32.6     2017 06:55    137    |  98     |  16     ----------------------------<  122    3.3     |  32     |  0.55     Ca    7.9        2017 06:55  Mg     1.7       2017 06:55    TPro  4.9    /  Alb  2.3    /  TBili  0.5    /  DBili  x      /  AST  20     /  ALT  18     /  AlkPhos  70     2017 06:55     LIVER FUNCTIONS - ( 2017 06:55 )  Alb: 2.3 g/dL / Pro: 4.9 gm/dL / ALK PHOS: 70 U/L / ALT: 18 U/L / AST: 20 U/L / GGT: x           Urinalysis Basic - ( 2017 21:10 )    Color: Yellow / Appearance: very cloudy / S.025 / pH: x .Gluc: x / Ketone: Small  / Bili: Negative / Urobili: Negative mg/dL   Blood: x / Protein: 30 mg/dL / Nitrite: Positive   Leuk Esterase: Moderate / RBC: 6-10 /HPF / WBC >50   Sq Epi: x / Non Sq Epi: Moderate / Bacteria: Many      Radiology:    Venous US: Positive for DVT in the right popliteal vein.    Negative for DVT in the left leg.      CXR: Clear lungs. No effusions or pneumothorax.

## 2017-01-31 NOTE — PROGRESS NOTE ADULT - PROBLEM SELECTOR PLAN 6
- history of rectal sheath hematoma on previous admission ~a month ago while on heparin SQ  - will check CT abdomen to check for resolution  - monitor CBC

## 2017-01-31 NOTE — PROGRESS NOTE ADULT - ATTENDING COMMENTS
Patient seen and examined with ALBAN Haines.  Agree with physical exam and assessment and plan.  - RLE acute DVT - case d/w pt and family - dtr Carolina who is RN at [facility] still deciding on what to do as far as long term anticoagulation.  d/w her plan for VKA vs NOAC vs IVC filter and d/w her r/b/a of each.  given recent rectus sheath hematoma will obtain CT to ensure resolution since pt was started on lovenox for DVT to make sure it can be continued. if still present will need to stop and place IVC filter.   - LLE cellulitis - spoke with mary ann will continue vanco and rocephin day 2  - chronic systolic dysfunction - consult cardio dr. oliveira

## 2017-01-31 NOTE — CONSULT NOTE ADULT - ASSESSMENT
83 y/o Female with h/o  HTN, HLD, CAD s/p NSTEMI, moderate asthma, recent hospitalization in 12/2016 for RSV Bronchiolitis with hypercapneic and hypoxemic respiratory failure that required endotracheal intubation was admitted on 1/31 progressively worsening B/L LE swelling and erythema.    1. B/l LE cellulitis. 83 y/o Female with h/o HTN, HLD, CAD s/p NSTEMI, moderate asthma, recent hospitalization in 12/2016 for RSV Bronchiolitis with hypercapneic and hypoxemic respiratory failure that required endotracheal intubation was admitted on 1/31 progressively worsening B/L LE swelling and erythema.    1. LLE cellulitis. Probable underlying chronic LE dermatitis. Left leg DVT. Allergy to PCN.  -obtain BC x 2  -start vancomycin 750 mg IV q12h and ceftriaxone 1 gm IV qd  -reason for abx use and side effects reviewed with patient  -monitor temps  -f/u CBC  -elevate legs  -supportive care  2. Other issues: HTN, HLD, CAD s/p NSTEMI, moderate asthma  -care per medicine

## 2017-02-01 LAB
-  AMIKACIN: SIGNIFICANT CHANGE UP
-  AMPICILLIN/SULBACTAM: SIGNIFICANT CHANGE UP
-  AMPICILLIN: SIGNIFICANT CHANGE UP
-  AZTREONAM: SIGNIFICANT CHANGE UP
-  CEFAZOLIN: SIGNIFICANT CHANGE UP
-  CEFEPIME: SIGNIFICANT CHANGE UP
-  CEFOXITIN: SIGNIFICANT CHANGE UP
-  CEFTAZIDIME: SIGNIFICANT CHANGE UP
-  CEFTRIAXONE: SIGNIFICANT CHANGE UP
-  CIPROFLOXACIN: SIGNIFICANT CHANGE UP
-  ERTAPENEM: SIGNIFICANT CHANGE UP
-  GENTAMICIN: SIGNIFICANT CHANGE UP
-  IMIPENEM: SIGNIFICANT CHANGE UP
-  LEVOFLOXACIN: SIGNIFICANT CHANGE UP
-  MEROPENEM: SIGNIFICANT CHANGE UP
-  NITROFURANTOIN: SIGNIFICANT CHANGE UP
-  PIPERACILLIN/TAZOBACTAM: SIGNIFICANT CHANGE UP
-  TOBRAMYCIN: SIGNIFICANT CHANGE UP
-  TRIMETHOPRIM/SULFAMETHOXAZOLE: SIGNIFICANT CHANGE UP
ANION GAP SERPL CALC-SCNC: 6 MMOL/L — SIGNIFICANT CHANGE UP (ref 5–17)
BUN SERPL-MCNC: 22 MG/DL — SIGNIFICANT CHANGE UP (ref 7–23)
CALCIUM SERPL-MCNC: 8.1 MG/DL — LOW (ref 8.5–10.1)
CHLORIDE SERPL-SCNC: 98 MMOL/L — SIGNIFICANT CHANGE UP (ref 96–108)
CO2 SERPL-SCNC: 33 MMOL/L — HIGH (ref 22–31)
CREAT SERPL-MCNC: 0.67 MG/DL — SIGNIFICANT CHANGE UP (ref 0.5–1.3)
CULTURE RESULTS: SIGNIFICANT CHANGE UP
GLUCOSE SERPL-MCNC: 108 MG/DL — HIGH (ref 70–99)
HCT VFR BLD CALC: 29 % — LOW (ref 34.5–45)
HGB BLD-MCNC: 9.4 G/DL — LOW (ref 11.5–15.5)
INR BLD: 1.3 RATIO — HIGH (ref 0.88–1.16)
MCHC RBC-ENTMCNC: 30.6 PG — SIGNIFICANT CHANGE UP (ref 27–34)
MCHC RBC-ENTMCNC: 32.5 GM/DL — SIGNIFICANT CHANGE UP (ref 32–36)
MCV RBC AUTO: 94.3 FL — SIGNIFICANT CHANGE UP (ref 80–100)
METHOD TYPE: SIGNIFICANT CHANGE UP
ORGANISM # SPEC MICROSCOPIC CNT: SIGNIFICANT CHANGE UP
ORGANISM # SPEC MICROSCOPIC CNT: SIGNIFICANT CHANGE UP
PLATELET # BLD AUTO: 186 K/UL — SIGNIFICANT CHANGE UP (ref 150–400)
POTASSIUM SERPL-MCNC: 3.7 MMOL/L — SIGNIFICANT CHANGE UP (ref 3.5–5.3)
POTASSIUM SERPL-SCNC: 3.7 MMOL/L — SIGNIFICANT CHANGE UP (ref 3.5–5.3)
PROTHROM AB SERPL-ACNC: 14.3 SEC — HIGH (ref 10–13.1)
RBC # BLD: 3.08 M/UL — LOW (ref 3.8–5.2)
RBC # FLD: 14.5 % — SIGNIFICANT CHANGE UP (ref 10.3–14.5)
SODIUM SERPL-SCNC: 137 MMOL/L — SIGNIFICANT CHANGE UP (ref 135–145)
SPECIMEN SOURCE: SIGNIFICANT CHANGE UP
WBC # BLD: 10.7 K/UL — HIGH (ref 3.8–10.5)
WBC # FLD AUTO: 10.7 K/UL — HIGH (ref 3.8–10.5)

## 2017-02-01 PROCEDURE — 74176 CT ABD & PELVIS W/O CONTRAST: CPT | Mod: 26

## 2017-02-01 RX ORDER — CEFTRIAXONE 500 MG/1
1 INJECTION, POWDER, FOR SOLUTION INTRAMUSCULAR; INTRAVENOUS EVERY 24 HOURS
Qty: 0 | Refills: 0 | Status: DISCONTINUED | OUTPATIENT
Start: 2017-02-01 | End: 2017-02-01

## 2017-02-01 RX ORDER — MEROPENEM 1 G/30ML
1000 INJECTION INTRAVENOUS EVERY 8 HOURS
Qty: 0 | Refills: 0 | Status: COMPLETED | OUTPATIENT
Start: 2017-02-01 | End: 2017-02-01

## 2017-02-01 RX ORDER — MAGNESIUM OXIDE 400 MG ORAL TABLET 241.3 MG
400 TABLET ORAL ONCE
Qty: 0 | Refills: 0 | Status: COMPLETED | OUTPATIENT
Start: 2017-02-01 | End: 2017-02-01

## 2017-02-01 RX ORDER — FUROSEMIDE 40 MG
40 TABLET ORAL DAILY
Qty: 0 | Refills: 0 | Status: DISCONTINUED | OUTPATIENT
Start: 2017-02-01 | End: 2017-02-07

## 2017-02-01 RX ORDER — POTASSIUM CHLORIDE 20 MEQ
40 PACKET (EA) ORAL ONCE
Qty: 0 | Refills: 0 | Status: COMPLETED | OUTPATIENT
Start: 2017-02-01 | End: 2017-02-01

## 2017-02-01 RX ORDER — LISINOPRIL 2.5 MG/1
2.5 TABLET ORAL DAILY
Qty: 0 | Refills: 0 | Status: DISCONTINUED | OUTPATIENT
Start: 2017-02-01 | End: 2017-02-08

## 2017-02-01 RX ADMIN — ESCITALOPRAM OXALATE 10 MILLIGRAM(S): 10 TABLET, FILM COATED ORAL at 11:41

## 2017-02-01 RX ADMIN — Medication 150 MILLIGRAM(S): at 05:22

## 2017-02-01 RX ADMIN — Medication 81 MILLIGRAM(S): at 11:41

## 2017-02-01 RX ADMIN — CARVEDILOL PHOSPHATE 6.25 MILLIGRAM(S): 80 CAPSULE, EXTENDED RELEASE ORAL at 17:36

## 2017-02-01 RX ADMIN — Medication 150 MILLIGRAM(S): at 17:36

## 2017-02-01 RX ADMIN — Medication 40 MILLIEQUIVALENT(S): at 14:22

## 2017-02-01 RX ADMIN — Medication 0.12 MILLIGRAM(S): at 01:17

## 2017-02-01 RX ADMIN — MAGNESIUM OXIDE 400 MG ORAL TABLET 400 MILLIGRAM(S): 241.3 TABLET ORAL at 14:17

## 2017-02-01 RX ADMIN — CEFTRIAXONE 100 GRAM(S): 500 INJECTION, POWDER, FOR SOLUTION INTRAMUSCULAR; INTRAVENOUS at 14:25

## 2017-02-01 RX ADMIN — Medication 3 MILLILITER(S): at 20:35

## 2017-02-01 RX ADMIN — CARVEDILOL PHOSPHATE 6.25 MILLIGRAM(S): 80 CAPSULE, EXTENDED RELEASE ORAL at 05:23

## 2017-02-01 RX ADMIN — Medication 40 MILLIGRAM(S): at 14:28

## 2017-02-01 RX ADMIN — ENOXAPARIN SODIUM 80 MILLIGRAM(S): 100 INJECTION SUBCUTANEOUS at 05:22

## 2017-02-01 NOTE — CONSULT NOTE ADULT - SUBJECTIVE AND OBJECTIVE BOX
HPI:  83 y/o F PMHx significant for HTN, HLD, moderate persistent asthma, with recent hospitalization in 2016 where the patient was found to have a RSV Bronchiolitis with hypercapneic and hypoxemic respiratory failure requiring endotracheal intubation, NSTEMI  during the last admission and underwent left heart cath which showed nonobstructive CAD presented with cellulitis.    Pt c/o SOB and LAO which she attributes to her asthma.  Pedal edema which her daughter says that is better now compared to before.    PAST MEDICAL & SURGICAL HISTORY:  NSTEMI (non-ST elevated myocardial infarction): 2016  Hyperlipidemia, unspecified hyperlipidemia type  Moderate persistent asthma without complication  Bronchitis  Anxiety  HTN (hypertension)  No significant past surgical history      PREVIOUS DIAGNOSTIC TESTING:      ECHO FINDINGS: - LVEF 40%.    STRESS FINDINGS:    CATHETERIZATION FINDINGS: - non obsturctive CAD.    MEDICATIONS  (STANDING):  escitalopram 10milliGRAM(s) Oral daily  carvedilol 6.25milliGRAM(s) Oral every 12 hours  cefTRIAXone   IVPB  IV Intermittent   cefTRIAXone   IVPB 1Gram(s) IV Intermittent every 24 hours  aspirin  chewable 81milliGRAM(s) Oral daily  enoxaparin Injectable 80milliGRAM(s) SubCutaneous every 12 hours  vancomycin  IVPB 750milliGRAM(s) IV Intermittent every 12 hours  ALBUTerol    90 MICROgram(s) HFA Inhaler 2Puff(s) Inhalation every 6 hours  ALBUTerol/ipratropium for Nebulization. 3milliLiter(s) Nebulizer once  potassium chloride    Tablet ER 40milliEquivalent(s) Oral once  magnesium oxide 400milliGRAM(s) Oral once    MEDICATIONS  (PRN):  acetaminophen   Tablet 650milliGRAM(s) Oral every 6 hours PRN For Temp greater than 38 C (100.4 F)  ALPRAZolam 0.125milliGRAM(s) Oral every 8 hours PRN anxiety      FAMILY HISTORY:  No family history of premature CAD or SCD.        SOCIAL HISTORY:  non smoker, no alcohol use          Vital Signs Last 24 Hrs  T(C): 36.9, Max: 37.1 ( @ 16:49)  T(F): 98.5, Max: 98.8 ( @ 16:49)  HR: 86 (86 - 101)  BP: 90/62 (90/62 - 141/75)  BP(mean): --  RR: 20 (20 - 22)  SpO2: 98% (96% - 100%)        INTERPRETATION OF TELEMETRY:    ECG:no ekg on chart, ordered one    I&O's Detail      LABS:                        9.4    10.7  )-----------( 186      ( 2017 07:28 )             29.0     2017 07:28    137    |  98     |  22     ----------------------------<  108    3.7     |  33     |  0.67     Ca    8.1        2017 07:28  Mg     1.7       2017 06:55    TPro  4.9    /  Alb  2.3    /  TBili  0.5    /  DBili  x      /  AST  20     /  ALT  18     /  AlkPhos  70     2017 06:55        PT/INR - ( 2017 07:28 )   PT: 14.3 sec;   INR: 1.30 ratio         PTT - ( 2017 17:17 )  PTT:26.6 sec  Urinalysis Basic - ( 2017 21:10 )    Color: Yellow / Appearance: very cloudy / S.025 / pH: x  Gluc: x / Ketone: Small  / Bili: Negative / Urobili: Negative mg/dL   Blood: x / Protein: 30 mg/dL / Nitrite: Positive   Leuk Esterase: Moderate / RBC: 6-10 /HPF / WBC >50   Sq Epi: x / Non Sq Epi: Moderate / Bacteria: Many      I&O's Summary    BNPSerum Pro-Brain Natriuretic Peptide: 3163 pg/mL ( @ 17:29)    RADIOLOGY & ADDITIONAL STUDIES:

## 2017-02-01 NOTE — PROGRESS NOTE ADULT - PROBLEM SELECTOR PLAN 6
- history of rectal sheath hematoma on previous admission ~a month ago while on heparin SQ  - will check CT abdomen to check for resolution  --CT-Pos for Rectal Sheath Hematoma ,   -Monitor Pt/INR,CBC  -Plan do D/C Lovenox and Place IVF  -F/u Cardio consult - history of rectal sheath hematoma on previous admission ~a month ago while on heparin SQ  - will check CT abdomen to check for resolution  - stop lovenox  --CT-Pos for Rectal Sheath Hematoma ,   -Monitor Pt/INR,CBC  -Plan do D/C Lovenox and Place IVF  -F/u Cardio consult

## 2017-02-01 NOTE — PROGRESS NOTE ADULT - ATTENDING COMMENTS
Patient seen and examined with ALBAN Haines.  Agree with physical exam and assessment and plan.  - RLE acute DVT - case d/w pt and family - dtr Carolina who is RN at [facility] still deciding on what to do as far as long term anticoagulation.  d/w her plan for VKA vs NOAC vs IVC filter and d/w her r/b/a of each.  given recent rectus sheath hematoma will obtain CT to ensure resolution since pt was started on lovenox for DVT to make sure it can be continued. if still present will need to stop and place IVC filter.   - LLE cellulitis - spoke with mary ann will continue vanco and rocephin day 2  - chronic systolic dysfunction - consult cardio dr. oliveira Patient seen and examined with ALBAN Haines.  Agree with physical exam and assessment and plan.  - RLE acute DVT - case d/w pt and family - dtr Carolina who is RN at [facility] still deciding on what to do as far as long term anticoagulation.  d/w her plan for VKA vs NOAC vs IVC filter and d/w her r/b/a of each.  given recent rectus sheath hematoma will obtain CT abd obtained which still shows hematoma (decreasing in size), will place IVC filter and d/w dr correa and she will speak with dr morris.    - LLE cellulitis - spoke with mary ann will continue vanco and rocephin day 3  - acute on chronic systolic dysfunction - consult cardio dr. correa and add lasix 40 mg iv daily, continue coreg, low dose ACEI and close follow up with dr correa as outpt for CHF mgmt. monitor BMP and mg and keep K ~ 4 and mg  ~ 2    daughter updated by dr correa

## 2017-02-01 NOTE — PROGRESS NOTE ADULT - PROBLEM SELECTOR PLAN 2
~cont. LMWH  ~patient with history of rectal sheath hematoma on previous admission from heparin VTE prophylaxis  - will check CT if hematoma has resolved  -Plan for D/C Lovenox if hematoma present on CT  - Dr Knight spoke with daughter about oral anticoagulation vs IVC filter given patient history of spontaneous bleed on heparin SQ ~cont. LMWH  ~patient with history of rectal sheath hematoma on previous admission from heparin - and f/u CT shows heamtoma thereforw will stop lovenox and place IVC filter given bleeding risk and daughter updated and d/w dr correa  - will check CT if hematoma has resolved  -Plan for D/C Lovenox if hematoma present on CT  - Dr Knight spoke with daughter about oral anticoagulation vs IVC filter given patient history of spontaneous bleed on heparin SQ

## 2017-02-01 NOTE — PROGRESS NOTE ADULT - SUBJECTIVE AND OBJECTIVE BOX
Overnight received a call from RN for critical value, UC: + for MDR E. Coli. Pt is currently on IV Vanco and Ceftriaxone. Urine Cx and Sensitivity report shows E.Coli resistance to Ceftriaxone, will d/c ceftriaxone and switch it to IV Meropenem, Will give IV Meropenem stat one dose. ID to f/u in AM.     D/w Night ZEE

## 2017-02-01 NOTE — PROGRESS NOTE ADULT - SUBJECTIVE AND OBJECTIVE BOX
Patient is a 81 y/o old  Female who presents with a chief complaint of cellulitis (2017 01:53)    HPI:  81 y/o Female with h/o  HTN, HLD, CAD s/p NSTEMI, moderate asthma, recent hospitalization in 2016 for RSV Bronchiolitis with hypercapneic and hypoxemic respiratory failure that required endotracheal intubation was admitted on  progressively worsening B/L LE swelling and erythema. (2017 21:57)    Alert and verbal  Left shin tenderness  No fever or chills    MEDICATIONS  (STANDING):  escitalopram 10milliGRAM(s) Oral daily  carvedilol 6.25milliGRAM(s) Oral every 12 hours  cefTRIAXone   IVPB  IV Intermittent   cefTRIAXone   IVPB 1Gram(s) IV Intermittent every 24 hours  vancomycin  IVPB 750milliGRAM(s) IV Intermittent every 12 hours  ALBUTerol    90 MICROgram(s) HFA Inhaler 2Puff(s) Inhalation every 6 hours  ALBUTerol/ipratropium for Nebulization. 3milliLiter(s) Nebulizer once  potassium chloride    Tablet ER 40milliEquivalent(s) Oral once  magnesium oxide 400milliGRAM(s) Oral once  furosemide   Injectable 40milliGRAM(s) IV Push daily  lisinopril 2.5milliGRAM(s) Oral daily    MEDICATIONS  (PRN):  acetaminophen   Tablet 650milliGRAM(s) Oral every 6 hours PRN For Temp greater than 38 C (100.4 F)  ALPRAZolam 0.125milliGRAM(s) Oral every 8 hours PRN anxiety      Vital Signs Last 24 Hrs  T(C): 36.9, Max: 37.1 ( @ 16:49)  T(F): 98.5, Max: 98.8 ( @ 16:49)  HR: 86 (86 - 101)  BP: 90/62 (90/62 - 141/75)  BP(mean): --  RR: 20 (20 - 22)  SpO2: 98% (96% - 100%)    Physical Exam:      IConstitutional: frail looking  HEENT: NC/AT, EOMI, PERRLA  Neck: supple  Back: no tenderness  Respiratory: clear  Cardiovascular: S1S2 regular, no murmurs  Abdomen: soft, not tender, not distended, positive BS  Genitourinary: deferred  Rectal: deferred  Musculoskeletal: no muscle tenderness, no joint swelling or tenderness  Extremities: has pedal edema and tenderness; Left anterior shin erythema, edema and broken skin with scant yellow discharge  Neurological: AxOx3, moving all extremities, no focal deficits  Skin: leg rashe    Labs:                        9.4    10.7  )-----------( 186      ( 2017 07:28 )             29.0     2017 07:28    137    |  98     |  22     ----------------------------<  108    3.7     |  33     |  0.67     Ca    8.1        2017 07:28  Mg     1.7       2017 06:55    TPro  4.9    /  Alb  2.3    /  TBili  0.5    /  DBili  x      /  AST  20     /  ALT  18     /  AlkPhos  70     2017 06:55           Cultures:                       10.6   8.2   )-----------( 231      ( 2017 17:17 )             32.6     2017 06:55    137    |  98     |  16     ----------------------------<  122    3.3     |  32     |  0.55     Ca    7.9        2017 06:55  Mg     1.7       2017 06:55    TPro  4.9    /  Alb  2.3    /  TBili  0.5    /  DBili  x      /  AST  20     /  ALT  18     /  AlkPhos  70     2017 06:55     LIVER FUNCTIONS - ( 2017 06:55 )  Alb: 2.3 g/dL / Pro: 4.9 gm/dL / ALK PHOS: 70 U/L / ALT: 18 U/L / AST: 20 U/L / GGT: x           Urinalysis Basic - ( 2017 21:10 )    Color: Yellow / Appearance: very cloudy / S.025 / pH: x  Gluc: x / Ketone: Small  / Bili: Negative / Urobili: Negative mg/dL   Blood: x / Protein: 30 mg/dL / Nitrite: Positive   Leuk Esterase: Moderate / RBC: 6-10 /HPF / WBC >50   Sq Epi: x / Non Sq Epi: Moderate / Bacteria: Many    Venous doppler: Negative for DVT in the left leg.      CXR: Clear lungs. No effusions or pneumothorax.

## 2017-02-01 NOTE — PROGRESS NOTE ADULT - SUBJECTIVE AND OBJECTIVE BOX
CHIEF COMPLAINT: progressively worsening B/L LE swelling and erythema concerning for purulent cellulitis.      HPI :  83 y/o F PMHx significant for HTN, HLD, moderate persistent asthma, with recent hospitalization in 2016 where the patient was found to have a RSV Bronchiolitis with hypercapneic and hypoxemic respiratory failure requiring endotracheal intubation c/b a NSTEMI who was referred to the ED today from Dr. Ann' office for further evaluation of progressively worsening B/L LE swelling and erythema concerning for purulent cellulitis.  Workup showed Right popliteal DVT- patient was then started on lovenox therapeutic dose.     - Pt anxious given Alprazolam , b/l Wheezing , given Albuterol tx HS   - Pt has improved SOB. No new complaints         REVIEW OF SYSTEMS:    CONSTITUTIONAL: No weakness, fevers or chills  EYES/ENT: No visual changes;  No vertigo or throat pain   NECK: No pain or stiffness  RESPIRATORY:No cough,sob, wheezing, hemoptysis  CARDIOVASCULAR: No chest pain or palpitations  GASTROINTESTINAL: No abdominal or epigastric pain. No nausea, vomiting, or hematemesis; No diarrhea or constipation. No melena or hematochezia.  GENITOURINARY: No dysuria, frequency or hematuria  NEUROLOGICAL: No numbness or weakness  SKIN: No itching, burning, rashes, or lesions   All other review of systems is negative unless indicated above    Vital Signs Last 24 Hrs  T(C): 36.9, Max: 37.1 ( @ 16:49)  T(F): 98.5, Max: 98.8 ( @ 16:49)  HR: 86 (86 - 101)  BP: 90/62 (90/62 - 141/75)  BP(mean): --  RR: 20 (20 - 22)  SpO2: 98% (96% - 100%)          I&O's Summary    I & Os for current day (as of 2017 15:20)  =============================================  IN: 0 ml / OUT: 1 ml / NET: -1 ml      CAPILLARY BLOOD GLUCOSE      PHYSICAL EXAM:    Constitutional: NAD, awake and alert, well-developed  HEENT: PERR, EOMI, Normal Hearing, MMM  Neck: Soft and supple, No LAD, No JVD  Respiratory:Decreased breath sounds b/l, Breath sounds are clear bilaterally, No wheezing, rales or rhonchi  Cardiovascular: S1 and S2, regular rate and rhythm, no Murmurs, gallops or rubs  Gastrointestinal: Bowel Sounds present, soft, nontender, nondistended, no guarding, no rebound  Extremities: bilateral pitting edema to BLE  Vascular: 2+ peripheral pulses  Neurological: A/O x 3, no focal deficits  Musculoskeletal: 5/5 strength b/l upper and lower extremities  Skin: Temperature Warm on B/L LE , R calcaneal ulcer ,No rashes    MEDICATIONS:    MEDICATIONS  (STANDING):  escitalopram 10milliGRAM(s) Oral daily  carvedilol 6.25milliGRAM(s) Oral every 12 hours  cefTRIAXone   IVPB  IV Intermittent   cefTRIAXone   IVPB 1Gram(s) IV Intermittent every 24 hours  aspirin  chewable 81milliGRAM(s) Oral daily  enoxaparin Injectable 80milliGRAM(s) SubCutaneous every 12 hours  vancomycin  IVPB 750milliGRAM(s) IV Intermittent every 12 hours  ALBUTerol    90 MICROgram(s) HFA Inhaler 2Puff(s) Inhalation every 6 hours  ALBUTerol/ipratropium for Nebulization. 3milliLiter(s) Nebulizer once    MEDICATIONS  (PRN):  acetaminophen   Tablet 650milliGRAM(s) Oral every 6 hours PRN For Temp greater than 38 C (100.4 F)  ALPRAZolam 0.125milliGRAM(s) Oral every 8 hours PRN anxiety        LABS: All Labs Reviewed:                        9.4    10.7  )-----------( 186      ( 2017 07:28 )             29.0       2017 07:28    137    |  98     |  22     ----------------------------<  108    3.7     |  33     |  0.67     Ca    8.1        2017 07:28  Mg     1.7       2017 06:55    TPro  4.9    /  Alb  2.3    /  TBili  0.5    /  DBili  x      /  AST  20     /  ALT  18     /  AlkPhos  70     2017 06:55      Ca    7.9        2017 06:55  Mg     1.7       2017 06:55    TPro  4.9    /  Alb  2.3    /  TBili  0.5    /  DBili  x      /  AST  20     /  ALT  18     /  AlkPhos  70     2017 06:55    PT/INR - ( 2017 07:28 )   PT: 14.3 sec;   INR: 1.30 ratio         PTT - ( 2017 17:17 )  PTT:26.6 sec    Urinalysis Basic - ( 2017 21:10 )    Color: Yellow / Appearance: very cloudy / S.025 / pH: x  Gluc: x / Ketone: Small  / Bili: Negative / Urobili: Negative mg/dL   Blood: x / Protein: 30 mg/dL / Nitrite: Positive   Leuk Esterase: Moderate / RBC: 6-10 /HPF / WBC >50   Sq Epi: x / Non Sq Epi: Moderate / Bacteria: Many      EXAM:  CT ABDOMEN AND PELVIS                            PROCEDURE DATE:  2017        INTERPRETATION:  CT ABDOMEN AND PELVIS    HISTORY:  Follow-up rectus hematoma    Technique: CT of the abdomen and pelvis is performed without oral or   intravenous contrast. Axial images are supplemented with coronal and   sagittal reformations. This study was performed using automatic exposure   control (radiation dose reduction software) to obtain a diagnostic image   quality scan with patient dose as low as reasonably achievable.    Contrast:     None    Comparison: CT chest/abdomen/pelvis 12/10/2016    Findings:  LIVER: Normal.  SPLEEN: Punctate calcification.  PANCREAS: Atrophy.  GALLBLADDER/BILIARY TREE: Nondilated. Normal gallbladder.   ADRENALS: Normal.  KIDNEYS: No calcification, hydronephrosis, or soft tissue attenuating   renal mass.  LYMPHADENOPATHY/RETROPERITONEUM: No adenopathy or hematoma.  VASCULATURE: Aortoiliac calcification without aneurysm.  BOWEL: No bowel related abnormality.Small sliding hiatal hernia.  PELVIC VISCERA: Fibroid uterus.  PELVIC LYMPH NODES: No pelvic adenopathy.    PERITONEUM/ABDOMINAL WALL: No free air, ascites, or intra-abdominal   hemorrhage. Interval decrease in size and density of the left rectus   sheath hematoma measuring 7.7 x 2.7 x 8.1 cm (Craniocaudal, AP,   transverse dimensions) . Inferior extension of hematoma into the   prevesical space has resolved. No new sites of hemorrhage.    SKELETAL: No aggressive lesion.   LUNG BASES: Left base atelectasis versus consolidation. Low-attenuation   of the cardiac blood pool is consistent with anemia.    IMPRESSION:     Decreased size and density of left rectus sheath hematoma measuring 7.7 x   2.7 x 8.1 cm, previously 16.0 x 5.6 x 12.1 cm. No new sites of hemorrhage   in the abdomen and pelvis.    EXAM:  US DPLX LWR EXT VEINS COMPL BI                            PROCEDURE DATE:  2017        INTERPRETATION:  BILATERAL LOWER EXTREMITY VENOUS ULTRASOUND    INDICATION:  Bilateral lower extremity swelling.    TECHNIQUE: Grayscale, color and spectral Doppler evaluation of the   bilateral lower extremities with graded compression maneuvers was   performed.    COMPARISON: None.     FINDINGS:   Normal waveforms and compressibility are demonstrated in the common   femoral, femoral, and popliteal veins of the left lower extremity. There   is normal waveform and compressibility in the right common femoral and   femoral veins. There is partially occlusive thrombus in the right   popliteal vein.    There is right calf edema.    IMPRESSION:    Positive for DVT in the right popliteal vein.    Negative for DVT in the left leg.    Urinalysis Basic - ( 2017 21:10 )

## 2017-02-01 NOTE — PROGRESS NOTE ADULT - ASSESSMENT
81 y/o Female with h/o HTN, HLD, CAD s/p NSTEMI, moderate asthma, recent hospitalization in 12/2016 for RSV Bronchiolitis with hypercapneic and hypoxemic respiratory failure that required endotracheal intubation was admitted on 1/31 progressively worsening B/L LE swelling and erythema.    1. LLE cellulitis. Probable underlying chronic LE dermatitis. Right leg DVT. Allergy to PCN.  -leg is slightly better  -f/u BC x 2  -on vancomycin 750 mg IV q12h and ceftriaxone 1 gm IV qd # 2  -tolerating abx well so far; monitor BMP  -continue abx coverage  -IVC filter  -monitor temps  -f/u CBC  -elevate legs  -supportive care  2. Other issues: HTN, HLD, CAD s/p NSTEMI, moderate asthma  -care per medicine

## 2017-02-01 NOTE — PROGRESS NOTE ADULT - PROBLEM SELECTOR PLAN 1
- continue IV vanco and ceftriaxone  - ID consult  - elevate LE  -continue ABX - continue IV vanco and ceftriaxone  - ID consult apprecaited  - elevate LE  -continue ABX

## 2017-02-01 NOTE — CONSULT NOTE ADULT - ASSESSMENT
Acute DVT in the setting of prior rectus sheath hematoma- will get IVC filter since she is at risk for PE.  Dr Fleming informed. keep pt NPO.    Acute decompenstaed HFrEF -40% know LVEF - Will continue coreg for now.  Will add lisinopril 2.5mg po daily.  Will start diuresis with iv lasix 40mg daily.  Ordered potassium and magnesium for repletion.  Strict I/O and daily wt checks. Low sodium diet. Nutrition education.  Cammy barahonates closely.    She will need further uptitration of the guideline directed medical therapy and close f/u as outpt.   f/u with me upon discharge in heart failure clinic.    HTN, hyperlipidemia- continue current meds.    Thank you for allowing me to participate in the care of this patient. Please feel free to contact me with any questions.

## 2017-02-02 DIAGNOSIS — N39.0 URINARY TRACT INFECTION, SITE NOT SPECIFIED: ICD-10-CM

## 2017-02-02 LAB
ANION GAP SERPL CALC-SCNC: 5 MMOL/L — SIGNIFICANT CHANGE UP (ref 5–17)
BUN SERPL-MCNC: 23 MG/DL — SIGNIFICANT CHANGE UP (ref 7–23)
CALCIUM SERPL-MCNC: 8 MG/DL — LOW (ref 8.5–10.1)
CHLORIDE SERPL-SCNC: 98 MMOL/L — SIGNIFICANT CHANGE UP (ref 96–108)
CO2 SERPL-SCNC: 34 MMOL/L — HIGH (ref 22–31)
CREAT SERPL-MCNC: 0.66 MG/DL — SIGNIFICANT CHANGE UP (ref 0.5–1.3)
GLUCOSE SERPL-MCNC: 90 MG/DL — SIGNIFICANT CHANGE UP (ref 70–99)
HCT VFR BLD CALC: 28.6 % — LOW (ref 34.5–45)
HGB BLD-MCNC: 9.2 G/DL — LOW (ref 11.5–15.5)
INR BLD: 1.16 RATIO — SIGNIFICANT CHANGE UP (ref 0.88–1.16)
MCHC RBC-ENTMCNC: 30.3 PG — SIGNIFICANT CHANGE UP (ref 27–34)
MCHC RBC-ENTMCNC: 32.3 GM/DL — SIGNIFICANT CHANGE UP (ref 32–36)
MCV RBC AUTO: 93.7 FL — SIGNIFICANT CHANGE UP (ref 80–100)
PLATELET # BLD AUTO: 158 K/UL — SIGNIFICANT CHANGE UP (ref 150–400)
POTASSIUM SERPL-MCNC: 3.7 MMOL/L — SIGNIFICANT CHANGE UP (ref 3.5–5.3)
POTASSIUM SERPL-SCNC: 3.7 MMOL/L — SIGNIFICANT CHANGE UP (ref 3.5–5.3)
PROTHROM AB SERPL-ACNC: 12.8 SEC — SIGNIFICANT CHANGE UP (ref 10–13.1)
RBC # BLD: 3.05 M/UL — LOW (ref 3.8–5.2)
RBC # FLD: 15 % — HIGH (ref 10.3–14.5)
SODIUM SERPL-SCNC: 137 MMOL/L — SIGNIFICANT CHANGE UP (ref 135–145)
VANCOMYCIN TROUGH SERPL-MCNC: 14.9 UG/ML — SIGNIFICANT CHANGE UP (ref 10–20)
WBC # BLD: 6 K/UL — SIGNIFICANT CHANGE UP (ref 3.8–10.5)
WBC # FLD AUTO: 6 K/UL — SIGNIFICANT CHANGE UP (ref 3.8–10.5)

## 2017-02-02 RX ORDER — POTASSIUM CHLORIDE 20 MEQ
10 PACKET (EA) ORAL
Qty: 0 | Refills: 0 | Status: COMPLETED | OUTPATIENT
Start: 2017-02-02 | End: 2017-02-02

## 2017-02-02 RX ORDER — MEROPENEM 1 G/30ML
500 INJECTION INTRAVENOUS EVERY 8 HOURS
Qty: 0 | Refills: 0 | Status: DISCONTINUED | OUTPATIENT
Start: 2017-02-02 | End: 2017-02-08

## 2017-02-02 RX ADMIN — ESCITALOPRAM OXALATE 10 MILLIGRAM(S): 10 TABLET, FILM COATED ORAL at 11:45

## 2017-02-02 RX ADMIN — CARVEDILOL PHOSPHATE 6.25 MILLIGRAM(S): 80 CAPSULE, EXTENDED RELEASE ORAL at 18:10

## 2017-02-02 RX ADMIN — MEROPENEM 200 MILLIGRAM(S): 1 INJECTION INTRAVENOUS at 14:03

## 2017-02-02 RX ADMIN — Medication 0.12 MILLIGRAM(S): at 11:49

## 2017-02-02 RX ADMIN — CARVEDILOL PHOSPHATE 6.25 MILLIGRAM(S): 80 CAPSULE, EXTENDED RELEASE ORAL at 06:08

## 2017-02-02 RX ADMIN — Medication 100 MILLIEQUIVALENT(S): at 22:40

## 2017-02-02 RX ADMIN — ALBUTEROL 2 PUFF(S): 90 AEROSOL, METERED ORAL at 08:59

## 2017-02-02 RX ADMIN — LISINOPRIL 2.5 MILLIGRAM(S): 2.5 TABLET ORAL at 06:07

## 2017-02-02 RX ADMIN — Medication 150 MILLIGRAM(S): at 06:07

## 2017-02-02 RX ADMIN — ALBUTEROL 2 PUFF(S): 90 AEROSOL, METERED ORAL at 20:01

## 2017-02-02 RX ADMIN — Medication 40 MILLIGRAM(S): at 06:08

## 2017-02-02 RX ADMIN — Medication 100 MILLIEQUIVALENT(S): at 21:58

## 2017-02-02 RX ADMIN — MEROPENEM 200 MILLIGRAM(S): 1 INJECTION INTRAVENOUS at 01:25

## 2017-02-02 RX ADMIN — ALBUTEROL 2 PUFF(S): 90 AEROSOL, METERED ORAL at 14:27

## 2017-02-02 NOTE — PROGRESS NOTE ADULT - SUBJECTIVE AND OBJECTIVE BOX
Patient is a 81 y/o old  Female who presents with a chief complaint of cellulitis (2017 01:53)    HPI:  81 y/o Female with h/o  HTN, HLD, CAD s/p NSTEMI, moderate asthma, recent hospitalization in 2016 for RSV Bronchiolitis with hypercapneic and hypoxemic respiratory failure that required endotracheal intubation was admitted on  progressively worsening B/L LE swelling and erythema. (2017 21:57)    Alert and verbal  Left shin tenderness is improved  Mild confusion  No fever or chills    MEDICATIONS  (STANDING):  escitalopram 10milliGRAM(s) Oral daily  carvedilol 6.25milliGRAM(s) Oral every 12 hours  vancomycin  IVPB 750milliGRAM(s) IV Intermittent every 12 hours  ALBUTerol    90 MICROgram(s) HFA Inhaler 2Puff(s) Inhalation every 6 hours  furosemide   Injectable 40milliGRAM(s) IV Push daily  lisinopril 2.5milliGRAM(s) Oral daily  meropenem IVPB 500milliGRAM(s) IV Intermittent every 8 hours    MEDICATIONS  (PRN):  acetaminophen   Tablet 650milliGRAM(s) Oral every 6 hours PRN For Temp greater than 38 C (100.4 F)  ALPRAZolam 0.125milliGRAM(s) Oral every 8 hours PRN anxiety      Vital Signs Last 24 Hrs  T(C): 36.5, Max: 36.8 ( @ 21:33)  T(F): 97.7, Max: 98.2 ( @ 21:33)  HR: 85 (78 - 90)  BP: 110/82 (99/83 - 113/65)  BP(mean): --  RR: 20 (18 - 20)  SpO2: 100% (99% - 100%)    Physical Exam:      IConstitutional: frail looking  HEENT: NC/AT, EOMI, PERRLA  Neck: supple  Back: no tenderness  Respiratory: clear  Cardiovascular: S1S2 regular, no murmurs  Abdomen: soft, not tender, not distended, positive BS  Genitourinary: deferred  Rectal: deferred  Musculoskeletal: no muscle tenderness, no joint swelling or tenderness  Extremities: has pedal edema and tenderness; Left anterior shin erythema, edema and broken skin with scant yellow discharge  Neurological: AxOx3, moving all extremities, no focal deficits  Skin: leg rashe    Labs:                        9.2    6.0   )-----------( 158      ( 2017 04:30 )             28.6     2017 04:29    137    |  98     |  23     ----------------------------<  90     3.7     |  34     |  0.66     Ca    8.0        2017 04:29         Vancomycin Level, Trough: 14.9 ug/mL ( @ 04:30)      Cultures:                       9.4    10.7  )-----------( 186      ( 2017 07:28 )             29.0     2017 07:28    137    |  98     |  22     ----------------------------<  108    3.7     |  33     |  0.67     Ca    8.1        2017 07:28  Mg     1.7       2017 06:55    TPro  4.9    /  Alb  2.3    /  TBili  0.5    /  DBili  x      /  AST  20     /  ALT  18     /  AlkPhos  70     2017 06:55           Cultures:                       10.6   8.2   )-----------( 231      ( 2017 17:17 )             32.6     2017 06:55    137    |  98     |  16     ----------------------------<  122    3.3     |  32     |  0.55     Ca    7.9        2017 06:55  Mg     1.7       2017 06:55    TPro  4.9    /  Alb  2.3    /  TBili  0.5    /  DBili  x      /  AST  20     /  ALT  18     /  AlkPhos  70     2017 06:55     LIVER FUNCTIONS - ( 2017 06:55 )  Alb: 2.3 g/dL / Pro: 4.9 gm/dL / ALK PHOS: 70 U/L / ALT: 18 U/L / AST: 20 U/L / GGT: x           Urinalysis Basic - ( 2017 21:10 )    Color: Yellow / Appearance: very cloudy / S.025 / pH: x  Gluc: x / Ketone: Small  / Bili: Negative / Urobili: Negative mg/dL   Blood: x / Protein: 30 mg/dL / Nitrite: Positive   Leuk Esterase: Moderate / RBC: 6-10 /HPF / WBC >50   Sq Epi: x / Non Sq Epi: Moderate / Bacteria: Many    Culture - Urine (17 @ 21:10)    -  Cefepime: R >16    -  Ceftazidime: R >16    -  Ceftriaxone: R >32    -  Ertapenem: S <=1    -  Gentamicin: S <=4    -  Nitrofurantoin: S <=32    -  Amikacin: S <=16    -  Cefoxitin: S <=8    -  Ciprofloxacin: R >2    -  Imipenem: S <=1    -  Ampicillin/Sulbactam: R 16/8    -  Cefazolin: R >16    -  Tobramycin: S <=4    -  Ampicillin: R >16    -  Aztreonam: R >16    -  Levofloxacin: R >4    -  Meropenem: S <=1    -  Piperacillin/Tazobactam: R <=16    -  Trimethoprim/Sulfamethoxazole: S <=2/38    Specimen Source: .Urine None    Culture Results:   >100,000 CFU/ml Escherichia coli (multi drug resistant)    Organism Identification: Escherichia coli (multi drug resistant)    Organism: Escherichia coli (multi drug resistant)    Method Type: BILL        Venous doppler: Negative for DVT in the left leg.      CXR: Clear lungs. No effusions or pneumothorax.

## 2017-02-02 NOTE — PROGRESS NOTE ADULT - SUBJECTIVE AND OBJECTIVE BOX
CHIEF COMPLAINT: swelling in lower extremities b/l    SUBJECTIVE: Patient is a 82 y.o. female with PMH HTN, hyperlipidemia, moderate persistent asthma who was recently hospitalized in december 2016 for RSV bronchiolitis and was intubated x 2 days.  Was discharged to [facility] at [facility] for one month and was at home for the past 2 weeks.  On previous admission had a rectus sheath hematoma develop spontaneously    REVIEW OF SYSTEMS:    CONSTITUTIONAL: No weakness, fevers or chills  EYES/ENT: No visual changes;  No vertigo or throat pain   NECK: No pain or stiffness  RESPIRATORY: No cough, wheezing, hemoptysis; No shortness of breath  CARDIOVASCULAR: No chest pain or palpitations  GASTROINTESTINAL: No abdominal or epigastric pain. No nausea, vomiting, or hematemesis; No diarrhea or constipation. No melena or hematochezia.  GENITOURINARY: No dysuria, frequency or hematuria  NEUROLOGICAL: No numbness or weakness  SKIN: No itching, burning, rashes, or lesions   All other review of systems is negative unless indicated above    Vital Signs Last 24 Hrs  T(C): 36.5, Max: 36.8 (02-01 @ 21:33)  T(F): 97.7, Max: 98.2 (02-01 @ 21:33)  HR: 80 (78 - 90)  BP: 110/82 (99/83 - 113/65)  BP(mean): --  RR: 20 (18 - 20)  SpO2: 100% (99% - 100%)    I&O's Summary    I & Os for current day (as of 02 Feb 2017 11:13)  =============================================  IN: 0 ml / OUT: 2 ml / NET: -2 ml      CAPILLARY BLOOD GLUCOSE      PHYSICAL EXAM:    Constitutional: NAD, awake and alert, well-developed  HEENT: PERR, EOMI, Normal Hearing, MMM  Neck: Soft and supple, No LAD, No JVD  Respiratory: Breath sounds are clear bilaterally, No wheezing, rales or rhonchi  Cardiovascular: S1 and S2, regular rate and rhythm, no Murmurs, gallops or rubs  Gastrointestinal: Bowel Sounds present, soft, nontender, nondistended, no guarding, no rebound  Extremities: No peripheral edema  Vascular: 2+ peripheral pulses  Neurological: A/O x 3, no focal deficits  Musculoskeletal: 5/5 strength b/l upper and lower extremities  Skin: No rashes    MEDICATIONS:  MEDICATIONS  (STANDING):  escitalopram 10milliGRAM(s) Oral daily  carvedilol 6.25milliGRAM(s) Oral every 12 hours  vancomycin  IVPB 750milliGRAM(s) IV Intermittent every 12 hours  ALBUTerol    90 MICROgram(s) HFA Inhaler 2Puff(s) Inhalation every 6 hours  furosemide   Injectable 40milliGRAM(s) IV Push daily  lisinopril 2.5milliGRAM(s) Oral daily      LABS: All Labs Reviewed:                        9.2    6.0   )-----------( 158      ( 02 Feb 2017 04:30 )             28.6     02 Feb 2017 04:29    137    |  98     |  23     ----------------------------<  90     3.7     |  34     |  0.66     Ca    8.0        02 Feb 2017 04:29      PT/INR - ( 02 Feb 2017 04:30 )   PT: 12.8 sec;   INR: 1.16 ratio               Blood Culture: 01-30 @ 21:10  Organism Escherichia coli (multi drug resistant)  Gram Stain Blood -- Gram Stain --  Specimen Source .Urine None  Culture-Blood --    01-30 @ 17:17  Organism --  Gram Stain Blood -- Gram Stain --  Specimen Source .Blood None  Culture-Blood --        RADIOLOGY/EKG:    DVT PPX:    ADVANCED DIRECTIVE:    DISPOSITION: CHIEF COMPLAINT: swelling in lower extremities b/l    SUBJECTIVE: Patient is a 82 y.o. female with PMH HTN, hyperlipidemia, moderate persistent asthma who was recently hospitalized in december 2016 for RSV bronchiolitis and developed respiratory failure and was intubated x 2 days.  Was discharged to [facility] for one month and was at home for the past 2 weeks.  Right popliteal DVT seen on sono and started on therapeutic lovenox. On previous admission had a rectus sheath hematoma develop spontaneously and therefore lovenox stopped and IVC filter placed 2/1/17. Patient is confused and reporting hallucinations, seeing people in the room, although she is oriented x 3. Developed MDR UTI and was given one dose of Meropenem.     REVIEW OF SYSTEMS:    CONSTITUTIONAL: No weakness, fevers or chills  EYES/ENT: No visual changes;  No vertigo or throat pain   NECK: No pain or stiffness  RESPIRATORY: No cough, wheezing, hemoptysis; No shortness of breath  CARDIOVASCULAR: No chest pain or palpitations  GASTROINTESTINAL: No abdominal or epigastric pain. No nausea, vomiting, or hematemesis; No diarrhea or constipation. No melena or hematochezia.  GENITOURINARY: No dysuria, frequency or hematuria  NEUROLOGICAL: No numbness or weakness  SKIN: swelling in lower extremities  All other review of systems is negative unless indicated above    Vital Signs Last 24 Hrs  T(C): 36.5, Max: 36.8 (02-01 @ 21:33)  T(F): 97.7, Max: 98.2 (02-01 @ 21:33)  HR: 80 (78 - 90)  BP: 110/82 (99/83 - 113/65)  BP(mean): --  RR: 20 (18 - 20)  SpO2: 100% (99% - 100%)    I&O's Summary    I & Os for current day (as of 02 Feb 2017 11:13)  =============================================  IN: 0 ml / OUT: 2 ml / NET: -2 ml      CAPILLARY BLOOD GLUCOSE      PHYSICAL EXAM:    Constitutional: NAD, awake and alert, well-developed  HEENT: PERR, EOMI, Normal Hearing, MMM  Neck: Soft and supple, No LAD, No JVD  Respiratory: Breath sounds are clear bilaterally, No wheezing, rales or rhonchi  Cardiovascular: S1 and S2, regular rate and rhythm, no Murmurs, gallops or rubs  Gastrointestinal: Bowel Sounds present, soft, nontender, nondistended, no guarding, no rebound  Extremities: b/l lower extremity edema and erythema with clear discharge on left greater than right  Vascular: 2+ peripheral pulses  Neurological: A/O x 3, no focal deficits, confused, hallucinating  Musculoskeletal: 5/5 strength b/l upper and lower extremities  Skin: No rashes    MEDICATIONS:  MEDICATIONS  (STANDING):  escitalopram 10milliGRAM(s) Oral daily  carvedilol 6.25milliGRAM(s) Oral every 12 hours  vancomycin  IVPB 750milliGRAM(s) IV Intermittent every 12 hours  ALBUTerol    90 MICROgram(s) HFA Inhaler 2Puff(s) Inhalation every 6 hours  furosemide   Injectable 40milliGRAM(s) IV Push daily  lisinopril 2.5milliGRAM(s) Oral daily      LABS: All Labs Reviewed:                        9.2    6.0   )-----------( 158      ( 02 Feb 2017 04:30 )             28.6     02 Feb 2017 04:29    137    |  98     |  23     ----------------------------<  90     3.7     |  34     |  0.66     Ca    8.0        02 Feb 2017 04:29      PT/INR - ( 02 Feb 2017 04:30 )   PT: 12.8 sec;   INR: 1.16 ratio               Blood Culture: 01-30 @ 21:10  Organism Escherichia coli (multi drug resistant)  Gram Stain Blood -- Gram Stain --  Specimen Source .Urine None  Culture-Blood --    01-30 @ 17:17  Organism --  Gram Stain Blood -- Gram Stain --  Specimen Source .Blood None  Culture-Blood --        RADIOLOGY/EKG:    DVT PPX:    ADVANCED DIRECTIVE:    DISPOSITION: CHIEF COMPLAINT: swelling in lower extremities b/l    SUBJECTIVE: Patient is a 82 y.o. female with PMH HTN, hyperlipidemia, moderate persistent asthma who was recently hospitalized in december 2016 for RSV bronchiolitis and developed respiratory failure and was intubated x 2 days.  Was discharged to [facility] [place] [facility] for one month and was at home for the past 2 weeks.  Right popliteal DVT seen on sono and started on therapeutic lovenox. On previous admission had a rectus sheath hematoma develop spontaneously and therefore lovenox stopped and IVC filter placed 2/1/17. Patient is confused and reporting hallucinations, seeing people in the room, although she is oriented x 3. Developed MDR UTI and was started on Meropenem.     REVIEW OF SYSTEMS:    CONSTITUTIONAL: No weakness, fevers or chills  EYES/ENT: No visual changes;  No vertigo or throat pain   NECK: No pain or stiffness  RESPIRATORY: No cough, wheezing, hemoptysis; No shortness of breath  CARDIOVASCULAR: No chest pain or palpitations  GASTROINTESTINAL: No abdominal or epigastric pain. No nausea, vomiting, or hematemesis; No diarrhea or constipation. No melena or hematochezia.  GENITOURINARY: No dysuria, frequency or hematuria  NEUROLOGICAL: No numbness or weakness  SKIN: swelling in lower extremities  All other review of systems is negative unless indicated above    Vital Signs Last 24 Hrs  T(C): 36.5, Max: 36.8 (02-01 @ 21:33)  T(F): 97.7, Max: 98.2 (02-01 @ 21:33)  HR: 80 (78 - 90)  BP: 110/82 (99/83 - 113/65)  BP(mean): --  RR: 20 (18 - 20)  SpO2: 100% (99% - 100%)    I&O's Summary    I & Os for current day (as of 02 Feb 2017 11:13)  =============================================  IN: 0 ml / OUT: 2 ml / NET: -2 ml      CAPILLARY BLOOD GLUCOSE      PHYSICAL EXAM:    Constitutional: NAD, awake and alert, well-developed  HEENT: PERR, EOMI, Normal Hearing, MMM  Neck: Soft and supple, No LAD, No JVD  Respiratory: Breath sounds are clear bilaterally, No wheezing, rales or rhonchi  Cardiovascular: S1 and S2, regular rate and rhythm, no Murmurs, gallops or rubs  Gastrointestinal: Bowel Sounds present, soft, nontender, nondistended, no guarding, no rebound  Extremities: b/l lower extremity edema and erythema with clear discharge on left greater than right  Vascular: 2+ peripheral pulses  Neurological: A/O x 3, no focal deficits, confused, hallucinating  Musculoskeletal: 5/5 strength b/l upper and lower extremities  Skin: No rashes    MEDICATIONS:  MEDICATIONS  (STANDING):  escitalopram 10milliGRAM(s) Oral daily  carvedilol 6.25milliGRAM(s) Oral every 12 hours  vancomycin  IVPB 750milliGRAM(s) IV Intermittent every 12 hours  ALBUTerol    90 MICROgram(s) HFA Inhaler 2Puff(s) Inhalation every 6 hours  furosemide   Injectable 40milliGRAM(s) IV Push daily  lisinopril 2.5milliGRAM(s) Oral daily      LABS: All Labs Reviewed:                        9.2    6.0   )-----------( 158      ( 02 Feb 2017 04:30 )             28.6     02 Feb 2017 04:29    137    |  98     |  23     ----------------------------<  90     3.7     |  34     |  0.66     Ca    8.0        02 Feb 2017 04:29      PT/INR - ( 02 Feb 2017 04:30 )   PT: 12.8 sec;   INR: 1.16 ratio               Blood Culture: 01-30 @ 21:10  Organism Escherichia coli (multi drug resistant)  Gram Stain Blood -- Gram Stain --  Specimen Source .Urine None  Culture-Blood --    01-30 @ 17:17  Organism --  Gram Stain Blood -- Gram Stain --  Specimen Source .Blood None  Culture-Blood --        RADIOLOGY/EKG:    DVT PPX:    ADVANCED DIRECTIVE:    DISPOSITION:

## 2017-02-02 NOTE — PROGRESS NOTE ADULT - ASSESSMENT
Patient is a 82 y.o. female with PMH HTN, hyperlipidemia, moderate persistent asthma who was recently hospitalized in december 2016 for RSV bronchiolitis and developed respiratory failure and was intubated x 2 days. Was discharged to [facility] for one month and was at home for the past 2 weeks.  Right popliteal DVT seen on sono and started on therapeutic lovenox. On previous admission had a rectus sheath hematoma develop spontaneously and therefore lovenox stopped and IVC filter placed 2/1/17. Patient is confused and reporting hallucinations, seeing people in the room, although she is oriented x 3. Developed MDR UTI and was started on Meropenem.

## 2017-02-02 NOTE — CONSULT NOTE ADULT - CONSULT REASON
bilateral lower extremity edema and cellulitis and R popliteal DVT
abx management
bronchitis
pedal edema, CHF

## 2017-02-02 NOTE — PROGRESS NOTE ADULT - ASSESSMENT
83 y/o Female with h/o HTN, HLD, CAD s/p NSTEMI, moderate asthma, recent hospitalization in 12/2016 for RSV Bronchiolitis with hypercapneic and hypoxemic respiratory failure that required endotracheal intubation was admitted on 1/31 progressively worsening B/L LE swelling and erythema.    1. LLE cellulitis. Probable underlying chronic LE dermatitis. Right leg DVT. UTI with MDR-E.coli. Allergy to PCN.  -leg is slightly better  -f/u BC x 2  -on vancomycin 750 mg IV q12h and ceftriaxone 1 gm IV qd # 3  -tolerating abx well so far; monitor BMP  -change abx to meropenem 500 mg IV q8h  -monitor temps  -f/u CBC  -elevate legs  -supportive care  2. Other issues: HTN, HLD, CAD s/p NSTEMI, moderate asthma  -care per medicine

## 2017-02-02 NOTE — PROGRESS NOTE ADULT - SUBJECTIVE AND OBJECTIVE BOX
HPI:  81 y/o F PMHx significant for HTN, HLD, moderate persistent asthma, with recent hospitalization in 2016 where the patient was found to have a RSV Bronchiolitis with hypercapneic and hypoxemic respiratory failure requiring endotracheal intubation, NSTEMI  during the last admission and underwent left heart cath which showed nonobstructive CAD presented with cellulitis.    Pt c/o SOB and LAO which she attributes to her asthma.  Pedal edema which her daughter says that is better now compared to before.    PAST MEDICAL & SURGICAL HISTORY:  NSTEMI (non-ST elevated myocardial infarction): 2016  Hyperlipidemia, unspecified hyperlipidemia type  Moderate persistent asthma without complication  Bronchitis  Anxiety  HTN (hypertension)  No significant past surgical history      PREVIOUS DIAGNOSTIC TESTING:      ECHO FINDINGS: - LVEF 40%.    STRESS FINDINGS:    CATHETERIZATION FINDINGS: - non obsturctive CAD.    MEDICATIONS  (STANDING):  escitalopram 10milliGRAM(s) Oral daily  carvedilol 6.25milliGRAM(s) Oral every 12 hours  cefTRIAXone   IVPB  IV Intermittent   cefTRIAXone   IVPB 1Gram(s) IV Intermittent every 24 hours  aspirin  chewable 81milliGRAM(s) Oral daily  enoxaparin Injectable 80milliGRAM(s) SubCutaneous every 12 hours  vancomycin  IVPB 750milliGRAM(s) IV Intermittent every 12 hours  ALBUTerol    90 MICROgram(s) HFA Inhaler 2Puff(s) Inhalation every 6 hours  ALBUTerol/ipratropium for Nebulization. 3milliLiter(s) Nebulizer once  potassium chloride    Tablet ER 40milliEquivalent(s) Oral once  magnesium oxide 400milliGRAM(s) Oral once    MEDICATIONS  (PRN):  acetaminophen   Tablet 650milliGRAM(s) Oral every 6 hours PRN For Temp greater than 38 C (100.4 F)  ALPRAZolam 0.125milliGRAM(s) Oral every 8 hours PRN anxiety      FAMILY HISTORY:  No family history of premature CAD or SCD.        SOCIAL HISTORY:  non smoker, no alcohol use          Vital Signs Last 24 Hrs  T(C): 36.9, Max: 37.1 ( @ 16:49)  T(F): 98.5, Max: 98.8 ( @ 16:49)  HR: 86 (86 - 101)  BP: 90/62 (90/62 - 141/75)  BP(mean): --  RR: 20 (20 - 22)  SpO2: 98% (96% - 100%)        INTERPRETATION OF TELEMETRY:    ECG:no ekg on chart, ordered one    I&O's Detail      LABS:                        9.4    10.7  )-----------( 186      ( 2017 07:28 )             29.0     2017 07:28    137    |  98     |  22     ----------------------------<  108    3.7     |  33     |  0.67     Ca    8.1        2017 07:28  Mg     1.7       2017 06:55    TPro  4.9    /  Alb  2.3    /  TBili  0.5    /  DBili  x      /  AST  20     /  ALT  18     /  AlkPhos  70     2017 06:55        PT/INR - ( 2017 07:28 )   PT: 14.3 sec;   INR: 1.30 ratio         PTT - ( 2017 17:17 )  PTT:26.6 sec  Urinalysis Basic - ( 2017 21:10 )    Color: Yellow / Appearance: very cloudy / S.025 / pH: x  Gluc: x / Ketone: Small  / Bili: Negative / Urobili: Negative mg/dL   Blood: x / Protein: 30 mg/dL / Nitrite: Positive   Leuk Esterase: Moderate / RBC: 6-10 /HPF / WBC >50   Sq Epi: x / Non Sq Epi: Moderate / Bacteria: Many      I&O's Summary    BNPSerum Pro-Brain Natriuretic Peptide: 3163 pg/mL ( @ 17:29)    RADIOLOGY & ADDITIONAL STUDIES:

## 2017-02-02 NOTE — PROGRESS NOTE ADULT - ASSESSMENT
Acute DVT in the setting of prior rectus sheath hematoma-s/p IVC filter placement.    Acute decompenstaed HFrEF -40% know LVEF - Will continue coreg for now.  Will add lisinopril 2.5mg po daily.  continue  diuresis with iv lasix 40mg daily.   Ordered potassium for repletion.  Strict I/O and daily wt checks. Low sodium diet. Nutrition education.  Cammy pablolytes closely.    She will need further uptitration of the guideline directed medical therapy and close f/u as outpt.   f/u with me upon discharge in heart failure clinic.    HTN, hyperlipidemia- continue current meds.    Thank you for allowing me to participate in the care of this patient. Please feel free to contact me with any questions.

## 2017-02-03 LAB
ANION GAP SERPL CALC-SCNC: 4 MMOL/L — LOW (ref 5–17)
BUN SERPL-MCNC: 23 MG/DL — SIGNIFICANT CHANGE UP (ref 7–23)
CALCIUM SERPL-MCNC: 8.2 MG/DL — LOW (ref 8.5–10.1)
CHLORIDE SERPL-SCNC: 101 MMOL/L — SIGNIFICANT CHANGE UP (ref 96–108)
CO2 SERPL-SCNC: 36 MMOL/L — HIGH (ref 22–31)
CREAT SERPL-MCNC: 0.57 MG/DL — SIGNIFICANT CHANGE UP (ref 0.5–1.3)
GLUCOSE SERPL-MCNC: 91 MG/DL — SIGNIFICANT CHANGE UP (ref 70–99)
HCT VFR BLD CALC: 27.8 % — LOW (ref 34.5–45)
HGB BLD-MCNC: 9 G/DL — LOW (ref 11.5–15.5)
INR BLD: 1.12 RATIO — SIGNIFICANT CHANGE UP (ref 0.88–1.16)
MCHC RBC-ENTMCNC: 30.6 PG — SIGNIFICANT CHANGE UP (ref 27–34)
MCHC RBC-ENTMCNC: 32.3 GM/DL — SIGNIFICANT CHANGE UP (ref 32–36)
MCV RBC AUTO: 94.7 FL — SIGNIFICANT CHANGE UP (ref 80–100)
PLATELET # BLD AUTO: 151 K/UL — SIGNIFICANT CHANGE UP (ref 150–400)
POTASSIUM SERPL-MCNC: 4.5 MMOL/L — SIGNIFICANT CHANGE UP (ref 3.5–5.3)
POTASSIUM SERPL-SCNC: 4.5 MMOL/L — SIGNIFICANT CHANGE UP (ref 3.5–5.3)
PROTHROM AB SERPL-ACNC: 12.3 SEC — SIGNIFICANT CHANGE UP (ref 10–13.1)
RBC # BLD: 2.94 M/UL — LOW (ref 3.8–5.2)
RBC # FLD: 15.1 % — HIGH (ref 10.3–14.5)
SODIUM SERPL-SCNC: 141 MMOL/L — SIGNIFICANT CHANGE UP (ref 135–145)
WBC # BLD: 4.2 K/UL — SIGNIFICANT CHANGE UP (ref 3.8–10.5)
WBC # FLD AUTO: 4.2 K/UL — SIGNIFICANT CHANGE UP (ref 3.8–10.5)

## 2017-02-03 RX ORDER — FUROSEMIDE 40 MG
40 TABLET ORAL ONCE
Qty: 0 | Refills: 0 | Status: COMPLETED | OUTPATIENT
Start: 2017-02-03 | End: 2017-02-03

## 2017-02-03 RX ORDER — LISINOPRIL 2.5 MG/1
2.5 TABLET ORAL ONCE
Qty: 0 | Refills: 0 | Status: COMPLETED | OUTPATIENT
Start: 2017-02-03 | End: 2017-02-03

## 2017-02-03 RX ADMIN — Medication 0.12 MILLIGRAM(S): at 11:28

## 2017-02-03 RX ADMIN — MEROPENEM 200 MILLIGRAM(S): 1 INJECTION INTRAVENOUS at 21:06

## 2017-02-03 RX ADMIN — MEROPENEM 200 MILLIGRAM(S): 1 INJECTION INTRAVENOUS at 01:29

## 2017-02-03 RX ADMIN — ALBUTEROL 2 PUFF(S): 90 AEROSOL, METERED ORAL at 20:55

## 2017-02-03 RX ADMIN — MEROPENEM 200 MILLIGRAM(S): 1 INJECTION INTRAVENOUS at 13:23

## 2017-02-03 RX ADMIN — ESCITALOPRAM OXALATE 10 MILLIGRAM(S): 10 TABLET, FILM COATED ORAL at 14:57

## 2017-02-03 RX ADMIN — Medication 100 MILLIEQUIVALENT(S): at 00:10

## 2017-02-03 RX ADMIN — MEROPENEM 200 MILLIGRAM(S): 1 INJECTION INTRAVENOUS at 05:54

## 2017-02-03 RX ADMIN — Medication 40 MILLIGRAM(S): at 17:51

## 2017-02-03 NOTE — PROGRESS NOTE ADULT - ASSESSMENT
Patient is a 82 y.o. female with PMH HTN, hyperlipidemia, moderate persistent asthma who was recently hospitalized in december 2016 for RSV bronchiolitis and developed respiratory failure and was intubated x 2 days. Was discharged to [facility] for one month and was at home for the past 2 weeks.  Right popliteal DVT seen on sono and started on therapeutic lovenox. On previous admission had a rectus sheath hematoma develop spontaneously and therefore lovenox stopped and IVC filter placed 2/1/17. Patient is confused and reporting hallucinations, seeing people in the room, although she is oriented x 3. Developed MDR UTI and was started on Meropenem.

## 2017-02-03 NOTE — PROGRESS NOTE ADULT - ASSESSMENT
81 y/o Female with h/o HTN, HLD, CAD s/p NSTEMI, moderate asthma, recent hospitalization in 12/2016 for RSV Bronchiolitis with hypercapneic and hypoxemic respiratory failure that required endotracheal intubation was admitted on 1/31 progressively worsening B/L LE swelling and erythema.    1. LLE cellulitis improving. Probable underlying chronic LE dermatitis. Right leg DVT. UTI with MDR-E.coli. Allergy to PCN.  -leg is limproving  -f/u BC x 2  -on vancomycin 750 mg IV q12h # 4 and meropenem IV # 2  -tolerating abx well so far; monitor BMP  -continue abx coverage for 1-2 more days  -monitor temps  -f/u CBC  -elevate legs  -supportive care  2. Other issues: HTN, HLD, CAD s/p NSTEMI, moderate asthma  -care per medicine 83 y/o Female with h/o HTN, HLD, CAD s/p NSTEMI, moderate asthma, recent hospitalization in 12/2016 for RSV Bronchiolitis with hypercapneic and hypoxemic respiratory failure that required endotracheal intubation was admitted on 1/31 progressively worsening B/L LE swelling and erythema.    1. LLE cellulitis improving. Probable underlying chronic LE dermatitis. Right leg DVT. UTI with MDR-E.coli. Allergy to PCN.  -leg is limproving  -f/u BC x 2  -on meropenem IV # 2  -tolerating abx well so far; monitor BMP  -continue abx coverage for 1-2 more days  -monitor temps  -f/u CBC  -elevate legs  -supportive care  2. Other issues: HTN, HLD, CAD s/p NSTEMI, moderate asthma  -care per medicine

## 2017-02-03 NOTE — CDI QUERY NOTE - NSCDIOTHERTXTBX_GEN_ALL_CORE_HH
Urine C/S indicated > 100.000 E-Coli, on rocephin IVPB, if clinically significant, please document a clinically diagnosis associated with the above  (a) UTI    (b) Not significant    (c) Other (please specify condition)    Thanks

## 2017-02-03 NOTE — PROGRESS NOTE ADULT - SUBJECTIVE AND OBJECTIVE BOX
Patient is a 81 y/o old  Female who presents with a chief complaint of cellulitis (2017 01:53)    HPI:  81 y/o Female with h/o  HTN, HLD, CAD s/p NSTEMI, moderate asthma, recent hospitalization in 2016 for RSV Bronchiolitis with hypercapneic and hypoxemic respiratory failure that required endotracheal intubation was admitted on  progressively worsening B/L LE swelling and erythema. (2017 21:57)    Alert and verbal; feels stronger  Left shin tenderness is improved  No fever or chills    MEDICATIONS  (STANDING):  escitalopram 10milliGRAM(s) Oral daily  carvedilol 6.25milliGRAM(s) Oral every 12 hours  ALBUTerol    90 MICROgram(s) HFA Inhaler 2Puff(s) Inhalation every 6 hours  furosemide   Injectable 40milliGRAM(s) IV Push daily  lisinopril 2.5milliGRAM(s) Oral daily  meropenem IVPB 500milliGRAM(s) IV Intermittent every 8 hours    MEDICATIONS  (PRN):  acetaminophen   Tablet 650milliGRAM(s) Oral every 6 hours PRN For Temp greater than 38 C (100.4 F)  ALPRAZolam 0.125milliGRAM(s) Oral every 8 hours PRN anxiety      Vital Signs Last 24 Hrs  T(C): 36.3, Max: 36.5 (- @ 15:11)  T(F): 97.3, Max: 97.7 (- @ 15:11)  HR: 74 (74 - 112)  BP: 134/65 (109/79 - 134/65)  BP(mean): --  RR: 18 (18 - 18)  SpO2: 100% (100% - 100%)    Physical Exam:      Constitutional: frail looking  HEENT: NC/AT, EOMI, PERRLA  Neck: supple  Back: no tenderness  Respiratory: clear  Cardiovascular: S1S2 regular, no murmurs  Abdomen: soft, not tender, not distended, positive BS  Genitourinary: deferred  Rectal: deferred  Musculoskeletal: no muscle tenderness, no joint swelling or tenderness  Extremities: has pedal edema and tenderness; Left anterior shin erythema, edema and broken skin with scant yellow discharge  Neurological: AxOx3, moving all extremities, no focal deficits  Skin: leg rashe    Labs:                        9.0    4.2   )-----------( 151      ( 2017 08:05 )             27.8     2017 08:05    141    |  101    |  23     ----------------------------<  91     4.5     |  36     |  0.57     Ca    8.2        2017 08:05         Vancomycin Level, Trough: 14.9 ug/mL ( @ 04:30)      Cultures:              9.2    6.0   )-----------( 158      ( 2017 04:30 )             28.6     2017 04:29    137    |  98     |  23     ----------------------------<  90     3.7     |  34     |  0.66     Ca    8.0        2017 04:29         Vancomycin Level, Trough: 14.9 ug/mL ( @ 04:30)      Cultures:                       9.4    10.7  )-----------( 186      ( 2017 07:28 )             29.0     2017 07:28    137    |  98     |  22     ----------------------------<  108    3.7     |  33     |  0.67     Ca    8.1        2017 07:28  Mg     1.7       2017 06:55    TPro  4.9    /  Alb  2.3    /  TBili  0.5    /  DBili  x      /  AST  20     /  ALT  18     /  AlkPhos  70     2017 06:55           Cultures:                       10.6   8.2   )-----------( 231      ( 2017 17:17 )             32.6     2017 06:55    137    |  98     |  16     ----------------------------<  122    3.3     |  32     |  0.55     Ca    7.9        2017 06:55  Mg     1.7       2017 06:55    TPro  4.9    /  Alb  2.3    /  TBili  0.5    /  DBili  x      /  AST  20     /  ALT  18     /  AlkPhos  70     2017 06:55     LIVER FUNCTIONS - ( 2017 06:55 )  Alb: 2.3 g/dL / Pro: 4.9 gm/dL / ALK PHOS: 70 U/L / ALT: 18 U/L / AST: 20 U/L / GGT: x           Urinalysis Basic - ( 2017 21:10 )    Color: Yellow / Appearance: very cloudy / S.025 / pH: x  Gluc: x / Ketone: Small  / Bili: Negative / Urobili: Negative mg/dL   Blood: x / Protein: 30 mg/dL / Nitrite: Positive   Leuk Esterase: Moderate / RBC: 6-10 /HPF / WBC >50   Sq Epi: x / Non Sq Epi: Moderate / Bacteria: Many    Culture - Urine (17 @ 21:10)    -  Cefepime: R >16    -  Ceftazidime: R >16    -  Ceftriaxone: R >32    -  Ertapenem: S <=1    -  Gentamicin: S <=4    -  Nitrofurantoin: S <=32    -  Amikacin: S <=16    -  Cefoxitin: S <=8    -  Ciprofloxacin: R >2    -  Imipenem: S <=1    -  Ampicillin/Sulbactam: R 16/8    -  Cefazolin: R >16    -  Tobramycin: S <=4    -  Ampicillin: R >16    -  Aztreonam: R >16    -  Levofloxacin: R >4    -  Meropenem: S <=1    -  Piperacillin/Tazobactam: R <=16    -  Trimethoprim/Sulfamethoxazole: S <=2/38    Specimen Source: .Urine None    Culture Results:   >100,000 CFU/ml Escherichia coli (multi drug resistant)    Organism Identification: Escherichia coli (multi drug resistant)    Organism: Escherichia coli (multi drug resistant)    Method Type: BILL        Venous doppler: Negative for DVT in the left leg.      CXR: Clear lungs. No effusions or pneumothorax.

## 2017-02-03 NOTE — PROGRESS NOTE ADULT - ASSESSMENT
PROBLEMS:    Cellulitis of lower extremity, unspecified laterality  DVT lower extremity, distal, acute, right  Moderate persistent asthma without complication  Essential hypertension  Anxiety    PLAN:    anticoaulants  aerosols  supportive care  iv abx  ID fu

## 2017-02-03 NOTE — PROGRESS NOTE ADULT - SUBJECTIVE AND OBJECTIVE BOX
CHIEF COMPLAINT: swelling in lower extremities b/l    SUBJECTIVE: Patient is a 82 y.o. female with PMH HTN, hyperlipidemia, moderate persistent asthma who was recently hospitalized in december 2016 for RSV bronchiolitis and developed respiratory failure and was intubated x 2 days.  Was discharged to [facility] [place] at [facility] for one month and was at home for the past 2 weeks.  Right popliteal DVT seen on sono and started on therapeutic lovenox. On previous admission had a rectus sheath hematoma develop spontaneously and therefore lovenox stopped and IVC filter placed 2/1/17. Patient is confused and reporting hallucinations, seeing people in the room, although she is oriented x 3. Developed MDR UTI and was started on Meropenem.    2/3  Patient is less confused and appears in no apparent distress. s/p IVC Filter placed on 2/1. Redness and discharge over LE improving.     REVIEW OF SYSTEMS:    CONSTITUTIONAL: No weakness, fevers or chills  EYES/ENT: No visual changes;  No vertigo or throat pain   NECK: No pain or stiffness  RESPIRATORY: No cough, wheezing, hemoptysis; No shortness of breath  CARDIOVASCULAR: No chest pain or palpitations  GASTROINTESTINAL: No abdominal or epigastric pain. No nausea, vomiting, or hematemesis; No diarrhea or constipation. No melena or hematochezia.  GENITOURINARY: No dysuria, frequency or hematuria  NEUROLOGICAL: No numbness or weakness  SKIN:+ weeping edema over LE b/l improving   All other review of systems is negative unless indicated above    Vital Signs Last 24 Hrs  T(C): 36.5, Max: 36.5 (02-02 @ 22:37)  T(F): 97.7, Max: 97.7 (02-02 @ 22:37)  HR: 71 (71 - 88)  BP: 98/52 (98/52 - 134/65)  BP(mean): --  RR: 16 (16 - 18)  SpO2: 100% (100% - 100%)    I&O's Summary    I & Os for current day (as of 03 Feb 2017 20:27)  =============================================  IN: 100 ml / OUT: 0 ml / NET: 100 ml      CAPILLARY BLOOD GLUCOSE      PHYSICAL EXAM:    Constitutional: NAD, awake and alert, well-developed  HEENT: PERR, EOMI, Normal Hearing, MMM  Neck: Soft and supple, No LAD, No JVD  Respiratory: Breath sounds are clear bilaterally, No wheezing, rales or rhonchi  Cardiovascular: S1 and S2, regular rate and rhythm, no Murmurs, gallops or rubs  Gastrointestinal: Bowel Sounds present, soft, nontender, nondistended, no guarding, no rebound  Extremities: No peripheral edema  Vascular: 2+ peripheral pulses  Neurological: A/O x 3, no focal deficits  Musculoskeletal: 5/5 strength b/l upper and lower extremities  Skin: Left lower extremity erythema improved, less discharge    MEDICATIONS:  MEDICATIONS  (STANDING):  escitalopram 10milliGRAM(s) Oral daily  carvedilol 6.25milliGRAM(s) Oral every 12 hours  ALBUTerol    90 MICROgram(s) HFA Inhaler 2Puff(s) Inhalation every 6 hours  furosemide   Injectable 40milliGRAM(s) IV Push daily  lisinopril 2.5milliGRAM(s) Oral daily  meropenem IVPB 500milliGRAM(s) IV Intermittent every 8 hours  lisinopril 2.5milliGRAM(s) Oral once      LABS: All Labs Reviewed:                        9.0    4.2   )-----------( 151      ( 03 Feb 2017 08:05 )             27.8     03 Feb 2017 08:05    141    |  101    |  23     ----------------------------<  91     4.5     |  36     |  0.57     Ca    8.2        03 Feb 2017 08:05      PT/INR - ( 03 Feb 2017 08:05 )   PT: 12.3 sec;   INR: 1.12 ratio               Blood Culture: 01-30 @ 21:10  Organism Escherichia coli (multi drug resistant)  Gram Stain Blood -- Gram Stain --  Specimen Source .Urine None  Culture-Blood --    01-30 @ 17:17  Organism --  Gram Stain Blood -- Gram Stain --  Specimen Source .Blood None  Culture-Blood --        RADIOLOGY/EKG:    DVT PPX:    ADVANCED DIRECTIVE:    DISPOSITION:

## 2017-02-03 NOTE — PROGRESS NOTE ADULT - SUBJECTIVE AND OBJECTIVE BOX
Subjective:    pat lying in bed, no distress.    MEDICATIONS  (STANDING):  escitalopram 10milliGRAM(s) Oral daily  carvedilol 6.25milliGRAM(s) Oral every 12 hours  ALBUTerol    90 MICROgram(s) HFA Inhaler 2Puff(s) Inhalation every 6 hours  furosemide   Injectable 40milliGRAM(s) IV Push daily  lisinopril 2.5milliGRAM(s) Oral daily  meropenem IVPB 500milliGRAM(s) IV Intermittent every 8 hours    MEDICATIONS  (PRN):  acetaminophen   Tablet 650milliGRAM(s) Oral every 6 hours PRN For Temp greater than 38 C (100.4 F)  ALPRAZolam 0.125milliGRAM(s) Oral every 8 hours PRN anxiety      Allergies    penicillin (Anaphylaxis)  penicillins (Unknown)    Intolerances        Vital Signs Last 24 Hrs  T(C): 36.3, Max: 36.5 (02-02 @ 15:11)  T(F): 97.3, Max: 97.7 (02-02 @ 15:11)  HR: 74 (74 - 112)  BP: 134/65 (109/79 - 134/65)  BP(mean): --  RR: 18 (18 - 18)  SpO2: 100% (100% - 100%)    PHYSICAL EXAMINATION:    NECK:  Supple. No lymphadenopathy. Jugular venous pressure not elevated. Carotids equal.   HEART:   The cardiac impulse has a normal quality. Reg., Nl S1 and S2.  There are no murmurs, rubs or gallops noted  CHEST:  Chest rhonchi. Normal respiratory effort.  ABDOMEN:  Soft and nontender.   EXTREMITIES:  b/l edema with cellulitis.       LABS:                        9.0    4.2   )-----------( 151      ( 03 Feb 2017 08:05 )             27.8     03 Feb 2017 08:05    141    |  101    |  23     ----------------------------<  91     4.5     |  36     |  0.57     Ca    8.2        03 Feb 2017 08:05      PT/INR - ( 03 Feb 2017 08:05 )   PT: 12.3 sec;   INR: 1.12 ratio

## 2017-02-04 DIAGNOSIS — R60.9 EDEMA, UNSPECIFIED: ICD-10-CM

## 2017-02-04 LAB
CULTURE RESULTS: SIGNIFICANT CHANGE UP
CULTURE RESULTS: SIGNIFICANT CHANGE UP
SPECIMEN SOURCE: SIGNIFICANT CHANGE UP
SPECIMEN SOURCE: SIGNIFICANT CHANGE UP

## 2017-02-04 RX ADMIN — ESCITALOPRAM OXALATE 10 MILLIGRAM(S): 10 TABLET, FILM COATED ORAL at 11:17

## 2017-02-04 RX ADMIN — ALBUTEROL 2 PUFF(S): 90 AEROSOL, METERED ORAL at 20:07

## 2017-02-04 RX ADMIN — MEROPENEM 200 MILLIGRAM(S): 1 INJECTION INTRAVENOUS at 14:26

## 2017-02-04 RX ADMIN — ALBUTEROL 2 PUFF(S): 90 AEROSOL, METERED ORAL at 16:24

## 2017-02-04 RX ADMIN — MEROPENEM 200 MILLIGRAM(S): 1 INJECTION INTRAVENOUS at 05:33

## 2017-02-04 RX ADMIN — MEROPENEM 200 MILLIGRAM(S): 1 INJECTION INTRAVENOUS at 22:04

## 2017-02-04 NOTE — PROGRESS NOTE ADULT - ASSESSMENT
Patient is a 82 y.o. female with PMH HTN, hyperlipidemia, moderate persistent asthma who was recently hospitalized in december 2016 for RSV bronchiolitis and developed respiratory failure and was intubated x 2 days. Was discharged to [facility] at [facility] for one month and was at home for the past 2 weeks.  Right popliteal DVT seen on sono and started on therapeutic lovenox. On previous admission had a rectus sheath hematoma develop spontaneously and therefore lovenox stopped and IVC filter placed 2/1/17. Developed MDR UTI and was started on Meropenem.

## 2017-02-04 NOTE — PROGRESS NOTE ADULT - ASSESSMENT
PROBLEMS:    Cellulitis of lower extremity, unspecified laterality  DVT lower extremity, distal, acute, right  Moderate persistent asthma without complication  Essential hypertension  Anxiety    PLAN:    iv merpenem  s/q lovenox  aerosols  supportive care  continue current rx

## 2017-02-04 NOTE — PROGRESS NOTE ADULT - SUBJECTIVE AND OBJECTIVE BOX
CHIEF COMPLAINT: Swelling in lower extremities b/l    SUBJECTIVE: Patient is a 82 y.o. female with PMH HTN, hyperlipidemia, moderate persistent asthma who was recently hospitalized in december 2016 for RSV bronchiolitis and developed respiratory failure and was intubated x 2 days.  Was discharged to [facility] at [facility] for one month and was at home for the past 2 weeks.  Right popliteal DVT seen on sono and started on therapeutic lovenox. On previous admission had a rectus sheath hematoma develop spontaneously and therefore lovenox stopped and IVC filter placed 2/1/17. Patient is confused and reporting hallucinations, seeing people in the room, although she is oriented x 3. Developed MDR UTI and was started on Meropenem.      2/4 SUBJECTIVE & OBJECTIVE: Pt seen and examined at bedside.  As per daughter has not gotten out of bed in 4 days.  Generally weak.  Afebrile.  Denies CP. Less confused today.  No longer hallucinating.     PHYSICAL EXAM:  T(C): 36.8, Max: 36.8   HR: 70 (69 - 78)  BP: 114/52 (98/52 - 125/73)  RR: 18 (16 - 18)  SpO2: 97% (96% - 100%)  Wt(kg): --   GENERAL: NAD, well-groomed, well-developed  HEAD:  Atraumatic, Normocephalic  EYES: EOMI, PERRLA, conjunctiva and sclera clear  ENMT: Moist mucous membranes  NECK: Supple, No JVD  NERVOUS SYSTEM:  Alert & Oriented X3, Motor Strength 5/5 B/L upper and lower extremities; DTRs 2+ intact and symmetric  CHEST/LUNG: Clear to auscultation bilaterally; No rales, rhonchi, wheezing, or rubs  HEART: Regular rate and rhythm; No murmurs, rubs, or gallops  ABDOMEN: Soft, Nontender, Nondistended; Bowel sounds present  EXTREMITIES:  2+ Peripheral Pulses, No clubbing, cyanosis, + edema and erythema.         MEDICATIONS  (STANDING):  escitalopram 10milliGRAM(s) Oral daily  carvedilol 6.25milliGRAM(s) Oral every 12 hours  ALBUTerol    90 MICROgram(s) HFA Inhaler 2Puff(s) Inhalation every 6 hours  furosemide   Injectable 40milliGRAM(s) IV Push daily  lisinopril 2.5milliGRAM(s) Oral daily  meropenem IVPB 500milliGRAM(s) IV Intermittent every 8 hours  lisinopril 2.5milliGRAM(s) Oral once    MEDICATIONS  (PRN):  acetaminophen   Tablet 650milliGRAM(s) Oral every 6 hours PRN For Temp greater than 38 C (100.4 F)  ALPRAZolam 0.125milliGRAM(s) Oral every 8 hours PRN anxiety      LABS:                        9.0    4.2   )-----------( 151      ( 03 Feb 2017 08:05 )             27.8     03 Feb 2017 08:05    141    |  101    |  23     ----------------------------<  91     4.5     |  36     |  0.57     Ca    8.2        03 Feb 2017 08:05      REVIEW OF SYSTEMS:    CONSTITUTIONAL: No weakness, fevers or chills  EYES/ENT: No visual changes;  No vertigo or throat pain   NECK: No pain or stiffness  RESPIRATORY: No cough, wheezing, hemoptysis; No shortness of breath  CARDIOVASCULAR: No chest pain or palpitations  GASTROINTESTINAL: No abdominal or epigastric pain. No nausea, vomiting, or hematemesis; No diarrhea or constipation. No melena or hematochezia.  GENITOURINARY: No dysuria, frequency or hematuria  NEUROLOGICAL: No numbness or weakness  SKIN:+ weeping edema over LE b/l improving   All other review of systems is negative unless indicated above           Blood Culture: 01-30 @ 21:10  Organism Escherichia coli (multi drug resistant)  Gram Stain Blood -- Gram Stain --  Specimen Source .Urine None  Culture-Blood --    01-30 @ 17:17  Organism --  Gram Stain Blood -- Gram Stain --  Specimen Source .Blood None  Culture-Blood --

## 2017-02-04 NOTE — PROGRESS NOTE ADULT - SUBJECTIVE AND OBJECTIVE BOX
Subjective:    pat better, lying in bed, no respiratory distress, leg swelling better.    MEDICATIONS  (STANDING):  escitalopram 10milliGRAM(s) Oral daily  carvedilol 6.25milliGRAM(s) Oral every 12 hours  ALBUTerol    90 MICROgram(s) HFA Inhaler 2Puff(s) Inhalation every 6 hours  furosemide   Injectable 40milliGRAM(s) IV Push daily  lisinopril 2.5milliGRAM(s) Oral daily  meropenem IVPB 500milliGRAM(s) IV Intermittent every 8 hours  lisinopril 2.5milliGRAM(s) Oral once    MEDICATIONS  (PRN):  acetaminophen   Tablet 650milliGRAM(s) Oral every 6 hours PRN For Temp greater than 38 C (100.4 F)  ALPRAZolam 0.125milliGRAM(s) Oral every 8 hours PRN anxiety      Allergies    penicillin (Anaphylaxis)  penicillins (Unknown)    Intolerances        Vital Signs Last 24 Hrs  T(C): 36.8, Max: 36.8 (02-03 @ 21:00)  T(F): 98.3, Max: 98.3 (02-03 @ 21:00)  HR: 70 (69 - 88)  BP: 114/52 (98/52 - 125/73)  BP(mean): --  RR: 18 (16 - 18)  SpO2: 97% (96% - 100%)    PHYSICAL EXAMINATION:    NECK:  Supple. No lymphadenopathy. Jugular venous pressure not elevated. Carotids equal.   HEART:   The cardiac impulse has a normal quality. Reg., Nl S1 and S2.  There are no murmurs, rubs or gallops noted  CHEST:  Chest is clear to auscultation. Normal respiratory effort.  ABDOMEN:  Soft and nontender.   EXTREMITIES:  There is no edema.       LABS:                        9.0    4.2   )-----------( 151      ( 03 Feb 2017 08:05 )             27.8     03 Feb 2017 08:05    141    |  101    |  23     ----------------------------<  91     4.5     |  36     |  0.57     Ca    8.2        03 Feb 2017 08:05      PT/INR - ( 03 Feb 2017 08:05 )   PT: 12.3 sec;   INR: 1.12 ratio

## 2017-02-05 LAB
ANION GAP SERPL CALC-SCNC: 5 MMOL/L — SIGNIFICANT CHANGE UP (ref 5–17)
BUN SERPL-MCNC: 23 MG/DL — SIGNIFICANT CHANGE UP (ref 7–23)
CALCIUM SERPL-MCNC: 7.9 MG/DL — LOW (ref 8.5–10.1)
CHLORIDE SERPL-SCNC: 100 MMOL/L — SIGNIFICANT CHANGE UP (ref 96–108)
CO2 SERPL-SCNC: 37 MMOL/L — HIGH (ref 22–31)
CREAT SERPL-MCNC: 0.46 MG/DL — LOW (ref 0.5–1.3)
GLUCOSE SERPL-MCNC: 108 MG/DL — HIGH (ref 70–99)
POTASSIUM SERPL-MCNC: 3.7 MMOL/L — SIGNIFICANT CHANGE UP (ref 3.5–5.3)
POTASSIUM SERPL-SCNC: 3.7 MMOL/L — SIGNIFICANT CHANGE UP (ref 3.5–5.3)
SODIUM SERPL-SCNC: 142 MMOL/L — SIGNIFICANT CHANGE UP (ref 135–145)

## 2017-02-05 RX ADMIN — Medication 40 MILLIGRAM(S): at 06:13

## 2017-02-05 RX ADMIN — CARVEDILOL PHOSPHATE 6.25 MILLIGRAM(S): 80 CAPSULE, EXTENDED RELEASE ORAL at 06:14

## 2017-02-05 RX ADMIN — ALBUTEROL 2 PUFF(S): 90 AEROSOL, METERED ORAL at 21:26

## 2017-02-05 RX ADMIN — MEROPENEM 200 MILLIGRAM(S): 1 INJECTION INTRAVENOUS at 06:14

## 2017-02-05 RX ADMIN — LISINOPRIL 2.5 MILLIGRAM(S): 2.5 TABLET ORAL at 06:14

## 2017-02-05 RX ADMIN — MEROPENEM 200 MILLIGRAM(S): 1 INJECTION INTRAVENOUS at 22:42

## 2017-02-05 RX ADMIN — ALBUTEROL 2 PUFF(S): 90 AEROSOL, METERED ORAL at 14:17

## 2017-02-05 RX ADMIN — ESCITALOPRAM OXALATE 10 MILLIGRAM(S): 10 TABLET, FILM COATED ORAL at 12:16

## 2017-02-05 RX ADMIN — MEROPENEM 200 MILLIGRAM(S): 1 INJECTION INTRAVENOUS at 13:12

## 2017-02-05 RX ADMIN — ALBUTEROL 2 PUFF(S): 90 AEROSOL, METERED ORAL at 08:40

## 2017-02-05 RX ADMIN — CARVEDILOL PHOSPHATE 6.25 MILLIGRAM(S): 80 CAPSULE, EXTENDED RELEASE ORAL at 18:06

## 2017-02-05 NOTE — PROGRESS NOTE ADULT - SUBJECTIVE AND OBJECTIVE BOX
Subjective:    pat no new complaint.    MEDICATIONS  (STANDING):  escitalopram 10milliGRAM(s) Oral daily  carvedilol 6.25milliGRAM(s) Oral every 12 hours  ALBUTerol    90 MICROgram(s) HFA Inhaler 2Puff(s) Inhalation every 6 hours  furosemide   Injectable 40milliGRAM(s) IV Push daily  lisinopril 2.5milliGRAM(s) Oral daily  meropenem IVPB 500milliGRAM(s) IV Intermittent every 8 hours  lisinopril 2.5milliGRAM(s) Oral once    MEDICATIONS  (PRN):  acetaminophen   Tablet 650milliGRAM(s) Oral every 6 hours PRN For Temp greater than 38 C (100.4 F)  ALPRAZolam 0.125milliGRAM(s) Oral every 8 hours PRN anxiety      Allergies    penicillin (Anaphylaxis)  penicillins (Unknown)    Intolerances        Vital Signs Last 24 Hrs  T(C): 36.8, Max: 36.8 (02-05 @ 09:05)  T(F): 98.3, Max: 98.3 (02-05 @ 09:05)  HR: 68 (68 - 98)  BP: 86/46 (86/46 - 121/50)  BP(mean): --  RR: 18 (17 - 18)  SpO2: 100% (97% - 100%)    PHYSICAL EXAMINATION:    NECK:  Supple. No lymphadenopathy. Jugular venous pressure not elevated. Carotids equal.   HEART:   The cardiac impulse has a normal quality. Reg., Nl S1 and S2.  There are no murmurs, rubs or gallops noted  CHEST:  Chest is clear to auscultation. Normal respiratory effort.  ABDOMEN:  Soft and nontender.   EXTREMITIES:  There is no edema.       LABS:    05 Feb 2017 07:14    142    |  100    |  23     ----------------------------<  108    3.7     |  37     |  0.46     Ca    7.9        05 Feb 2017 07:14

## 2017-02-05 NOTE — PROGRESS NOTE ADULT - SUBJECTIVE AND OBJECTIVE BOX
Subjective:  no distress  afebrile      MEDICATIONS  (STANDING):  escitalopram 10milliGRAM(s) Oral daily  carvedilol 6.25milliGRAM(s) Oral every 12 hours  ALBUTerol    90 MICROgram(s) HFA Inhaler 2Puff(s) Inhalation every 6 hours  furosemide   Injectable 40milliGRAM(s) IV Push daily  lisinopril 2.5milliGRAM(s) Oral daily  meropenem IVPB 500milliGRAM(s) IV Intermittent every 8 hours  lisinopril 2.5milliGRAM(s) Oral once    MEDICATIONS  (PRN):  acetaminophen   Tablet 650milliGRAM(s) Oral every 6 hours PRN For Temp greater than 38 C (100.4 F)  ALPRAZolam 0.125milliGRAM(s) Oral every 8 hours PRN anxiety      Allergies    penicillin (Anaphylaxis)  penicillins (Unknown)    Intolerances        REVIEW OF SYSTEMS:    CONSTITUTIONAL:  As per HPI.  HEENT:  Eyes:  No diplopia or blurred vision. ENT:  No earache, sore throat or runny nose.  CARDIOVASCULAR:  No pressure, squeezing, tightness, heaviness or aching about the chest, neck, axilla or epigastrium.  RESPIRATORY:  No cough, shortness of breath, PND or orthopnea.  GASTROINTESTINAL:  No nausea, vomiting or diarrhea.  GENITOURINARY:  No dysuria, frequency or urgency.  MUSCULOSKELETAL:  no joint pain, deformity, tenderness  EXTREMITIES: no clubbing cyanosis,edema  SKIN:  No change in skin, hair or nails.  NEUROLOGIC:  No paresthesias, fasciculations, seizures or weakness.  PSYCHIATRIC:  No disorder of thought or mood.  ENDOCRINE:  No heat or cold intolerance, polyuria or polydipsia.  HEMATOLOGICAL:  No easy bruising or bleedings:    Vital Signs Last 24 Hrs  T(C): 36.8, Max: 36.8 (02-05 @ 09:05)  T(F): 98.3, Max: 98.3 (02-05 @ 09:05)  HR: 68 (68 - 98)  BP: 86/46 (86/46 - 121/50)  BP(mean): --  RR: 18 (17 - 18)  SpO2: 100% (97% - 100%)    PHYSICAL EXAMINATION:  SKIN: no rashes  HEAD: NC/AT  EYES: PERRLA, EOMI  EARS: TM's intact  NOSE: no abnormalities  NECK:  Supple. No lymphadenopathy. Jugular venous pressure not elevated. Carotids equal.   HEART:   The cardiac impulse has a normal quality. Reg., Nl S1 and S2.  There are no murmurs, rubs or gallops noted  CHEST:  bilat ronchi  ABDOMEN:  Soft and nontender.   EXTREMITIES:  erythema, pos edema  NEURO: AAO x 3, no focal deficts       LABS:    05 Feb 2017 07:14    142    |  100    |  23     ----------------------------<  108    3.7     |  37     |  0.46     Ca    7.9        05 Feb 2017 07:14            RADIOLOGY & ADDITIONAL TESTS:

## 2017-02-05 NOTE — PROGRESS NOTE ADULT - ASSESSMENT
Patient is a 82 y.o. female with PMH HTN, hyperlipidemia, moderate persistent asthma who was recently hospitalized in december 2016 for RSV bronchiolitis and developed respiratory failure and was intubated x 2 days. Was discharged to [facility] at [facility] for one month and was at home for the past 2 weeks.  Right popliteal DVT seen on sono and started on therapeutic lovenox. On previous admission had a rectus sheath hematoma develop spontaneously and therefore lovenox stopped and IVC filter placed 2/1/17. Developed MDR UTI and was started on Meropenem.

## 2017-02-06 RX ORDER — SIMVASTATIN 20 MG/1
1 TABLET, FILM COATED ORAL
Qty: 0 | Refills: 0 | COMMUNITY

## 2017-02-06 RX ORDER — ASPIRIN/CALCIUM CARB/MAGNESIUM 324 MG
1 TABLET ORAL
Qty: 0 | Refills: 0 | COMMUNITY

## 2017-02-06 RX ORDER — CARVEDILOL PHOSPHATE 80 MG/1
1 CAPSULE, EXTENDED RELEASE ORAL
Qty: 0 | Refills: 0 | COMMUNITY

## 2017-02-06 RX ORDER — ESCITALOPRAM OXALATE 10 MG/1
1 TABLET, FILM COATED ORAL
Qty: 0 | Refills: 0 | COMMUNITY

## 2017-02-06 RX ADMIN — CARVEDILOL PHOSPHATE 6.25 MILLIGRAM(S): 80 CAPSULE, EXTENDED RELEASE ORAL at 05:48

## 2017-02-06 RX ADMIN — Medication 0.12 MILLIGRAM(S): at 11:10

## 2017-02-06 RX ADMIN — ESCITALOPRAM OXALATE 10 MILLIGRAM(S): 10 TABLET, FILM COATED ORAL at 11:11

## 2017-02-06 RX ADMIN — CARVEDILOL PHOSPHATE 6.25 MILLIGRAM(S): 80 CAPSULE, EXTENDED RELEASE ORAL at 17:30

## 2017-02-06 RX ADMIN — LISINOPRIL 2.5 MILLIGRAM(S): 2.5 TABLET ORAL at 05:48

## 2017-02-06 RX ADMIN — Medication 10 MILLIGRAM(S): at 15:27

## 2017-02-06 RX ADMIN — MEROPENEM 200 MILLIGRAM(S): 1 INJECTION INTRAVENOUS at 14:40

## 2017-02-06 RX ADMIN — Medication 40 MILLIGRAM(S): at 05:49

## 2017-02-06 RX ADMIN — ALBUTEROL 2 PUFF(S): 90 AEROSOL, METERED ORAL at 20:04

## 2017-02-06 RX ADMIN — MEROPENEM 200 MILLIGRAM(S): 1 INJECTION INTRAVENOUS at 05:49

## 2017-02-06 RX ADMIN — ALBUTEROL 2 PUFF(S): 90 AEROSOL, METERED ORAL at 08:20

## 2017-02-06 RX ADMIN — MEROPENEM 200 MILLIGRAM(S): 1 INJECTION INTRAVENOUS at 21:40

## 2017-02-06 NOTE — PROGRESS NOTE ADULT - ASSESSMENT
Patient is a 82 y.o. female with PMH HTN, hyperlipidemia, moderate persistent asthma who was recently hospitalized in december 2016 for RSV bronchiolitis and developed respiratory failure and was intubated x 2 days. Was discharged to [facility] at [facility] for one month and was at home for the past 2 weeks.  Right popliteal DVT seen on sono and started on therapeutic lovenox. On previous admission had a rectus sheath hematoma develop spontaneously and therefore lovenox stopped and IVC filter placed 2/1/17. Developed MDR UTI and was started on Meropenem.

## 2017-02-06 NOTE — PROGRESS NOTE ADULT - ASSESSMENT
83 y/o Female with h/o HTN, HLD, CAD s/p NSTEMI, moderate asthma, recent hospitalization in 12/2016 for RSV Bronchiolitis with hypercapneic and hypoxemic respiratory failure that required endotracheal intubation was admitted on 1/31 progressively worsening B/L LE swelling and erythema.    1. LLE cellulitis resolving. Probable underlying chronic LE dermatitis. Right leg DVT. UTI with MDR-E.coli resolving. Allergy to PCN.  -leg is limproved  -f/u BC x 2  -on meropenem IV # 5  -tolerating abx well so far; monitor BMP  -complete abx therapy today  -monitor temps  -f/u CBC  -elevate legs  -supportive care  2. Other issues: HTN, HLD, CAD s/p NSTEMI, moderate asthma  -care per medicine

## 2017-02-06 NOTE — PROGRESS NOTE ADULT - SUBJECTIVE AND OBJECTIVE BOX
Patient is a 81 y/o old  Female who presents with a chief complaint of cellulitis (2017 01:53)    HPI:  81 y/o Female with h/o  HTN, HLD, CAD s/p NSTEMI, moderate asthma, recent hospitalization in 2016 for RSV Bronchiolitis with hypercapneic and hypoxemic respiratory failure that required endotracheal intubation was admitted on  progressively worsening B/L LE swelling and erythema. (2017 21:57)    Alert and verbal  OOB to chair  Mild confusion  No fever or chills    MEDICATIONS  (STANDING):  escitalopram 10milliGRAM(s) Oral daily  carvedilol 6.25milliGRAM(s) Oral every 12 hours  ALBUTerol    90 MICROgram(s) HFA Inhaler 2Puff(s) Inhalation every 6 hours  furosemide   Injectable 40milliGRAM(s) IV Push daily  lisinopril 2.5milliGRAM(s) Oral daily  meropenem IVPB 500milliGRAM(s) IV Intermittent every 8 hours  lisinopril 2.5milliGRAM(s) Oral once  bisacodyl Suppository 10milliGRAM(s) Rectal once    MEDICATIONS  (PRN):  acetaminophen   Tablet 650milliGRAM(s) Oral every 6 hours PRN For Temp greater than 38 C (100.4 F)  ALPRAZolam 0.125milliGRAM(s) Oral every 8 hours PRN anxiety      Vital Signs Last 24 Hrs  T(C): 36.6, Max: 36.6 (02-05 @ 15:29)  T(F): 97.8, Max: 97.9 (02-05 @ 15:29)  HR: 59 (59 - 82)  BP: 105/49 (98/54 - 113/69)  BP(mean): --  RR: 17 (17 - 18)  SpO2: 100% (100% - 100%)    Physical Exam:    Constitutional: frail looking  HEENT: NC/AT, EOMI, PERRLA  Neck: supple  Back: no tenderness  Respiratory: clear  Cardiovascular: S1S2 regular, no murmurs  Abdomen: soft, not tender, not distended, positive BS  Genitourinary: deferred  Rectal: deferred  Musculoskeletal: no muscle tenderness, no joint swelling or tenderness  Extremities: has pedal edema and tenderness; Left anterior shin erythema, edema and broken skin with scant yellow discharge  Neurological: AxOx3, moving all extremities, no focal deficits  Skin: leg rashe    Labs:    2017 07:14    142    |  100    |  23     ----------------------------<  108    3.7     |  37     |  0.46     Ca    7.9        2017 07:14               9.2    6.0   )-----------( 158      ( 2017 04:30 )             28.6     2017 04:29    137    |  98     |  23     ----------------------------<  90     3.7     |  34     |  0.66     Ca    8.0        2017 04:29         Vancomycin Level, Trough: 14.9 ug/mL ( @ 04:30)      Cultures:                       9.4    10.7  )-----------( 186      ( 2017 07:28 )             29.0     2017 07:28    137    |  98     |  22     ----------------------------<  108    3.7     |  33     |  0.67     Ca    8.1        2017 07:28  Mg     1.7       2017 06:55    TPro  4.9    /  Alb  2.3    /  TBili  0.5    /  DBili  x      /  AST  20     /  ALT  18     /  AlkPhos  70     2017 06:55           Cultures:                       10.6   8.2   )-----------( 231      ( 2017 17:17 )             32.6     2017 06:55    137    |  98     |  16     ----------------------------<  122    3.3     |  32     |  0.55     Ca    7.9        2017 06:55  Mg     1.7       2017 06:55    TPro  4.9    /  Alb  2.3    /  TBili  0.5    /  DBili  x      /  AST  20     /  ALT  18     /  AlkPhos  70     2017 06:55     LIVER FUNCTIONS - ( 2017 06:55 )  Alb: 2.3 g/dL / Pro: 4.9 gm/dL / ALK PHOS: 70 U/L / ALT: 18 U/L / AST: 20 U/L / GGT: x           Urinalysis Basic - ( 2017 21:10 )    Color: Yellow / Appearance: very cloudy / S.025 / pH: x  Gluc: x / Ketone: Small  / Bili: Negative / Urobili: Negative mg/dL   Blood: x / Protein: 30 mg/dL / Nitrite: Positive   Leuk Esterase: Moderate / RBC: 6-10 /HPF / WBC >50   Sq Epi: x / Non Sq Epi: Moderate / Bacteria: Many    Culture - Urine (17 @ 21:10)    -  Cefepime: R >16    -  Ceftazidime: R >16    -  Ceftriaxone: R >32    -  Ertapenem: S <=1    -  Gentamicin: S <=4    -  Nitrofurantoin: S <=32    -  Amikacin: S <=16    -  Cefoxitin: S <=8    -  Ciprofloxacin: R >2    -  Imipenem: S <=1    -  Ampicillin/Sulbactam: R 16/8    -  Cefazolin: R >16    -  Tobramycin: S <=4    -  Ampicillin: R >16    -  Aztreonam: R >16    -  Levofloxacin: R >4    -  Meropenem: S <=1    -  Piperacillin/Tazobactam: R <=16    -  Trimethoprim/Sulfamethoxazole: S <=2/38    Specimen Source: .Urine None    Culture Results:   >100,000 CFU/ml Escherichia coli (multi drug resistant)    Organism Identification: Escherichia coli (multi drug resistant)    Organism: Escherichia coli (multi drug resistant)    Method Type: [facility]        Venous doppler: Negative for DVT in the left leg.      CXR: Clear lungs. No effusions or pneumothorax.

## 2017-02-06 NOTE — PROGRESS NOTE ADULT - SUBJECTIVE AND OBJECTIVE BOX
HPI: Patient is a 82 y.o. female with PMH HTN, hyperlipidemia, moderate persistent asthma who was recently hospitalized in december 2016 for RSV bronchiolitis and developed respiratory failure and was intubated x 2 days. Was discharged to [facility] for one month and was at home for the past 2 weeks.  Right popliteal DVT seen on sono and started on therapeutic lovenox. On previous admission had a rectus sheath hematoma develop spontaneously and therefore lovenox stopped and IVC filter placed 2/1/17. Developed MDR UTI and was started on Meropenem.   2/6/17- no new events      MEDICATIONS  (STANDING):  escitalopram 10milliGRAM(s) Oral daily  carvedilol 6.25milliGRAM(s) Oral every 12 hours  ALBUTerol    90 MICROgram(s) HFA Inhaler 2Puff(s) Inhalation every 6 hours  furosemide   Injectable 40milliGRAM(s) IV Push daily  lisinopril 2.5milliGRAM(s) Oral daily  meropenem IVPB 500milliGRAM(s) IV Intermittent every 8 hours  lisinopril 2.5milliGRAM(s) Oral once    MEDICATIONS  (PRN):  acetaminophen   Tablet 650milliGRAM(s) Oral every 6 hours PRN For Temp greater than 38 C (100.4 F)  ALPRAZolam 0.125milliGRAM(s) Oral every 8 hours PRN anxiety    REVIEW OF SYSTEMS:    CONSTITUTIONAL:  As per HPI.  HEENT:  Eyes:  No diplopia or blurred vision. ENT:  No earache, sore throat or runny nose.  CARDIOVASCULAR:  No pressure, squeezing, tightness, heaviness or aching about the chest, neck, axilla or epigastrium.  RESPIRATORY:  No cough, shortness of breath, PND or orthopnea.  GASTROINTESTINAL:  No nausea, vomiting or diarrhea.  GENITOURINARY:  No dysuria, frequency or urgency.  MUSCULOSKELETAL:  no joint pain, deformity, tenderness  EXTREMITIES: no clubbing cyanosis,edema  SKIN:  No change in skin, hair or nails.  NEUROLOGIC:  No paresthesias, fasciculations, seizures or weakness.  PSYCHIATRIC:  No disorder of thought or mood.  ENDOCRINE:  No heat or cold intolerance, polyuria or polydipsia.  HEMATOLOGICAL:  No easy bruising or bleedings:    Vital Signs Last 24 Hrs  T(C): 36.8, Max: 36.8 (02-05 @ 09:05)  T(F): 98.3, Max: 98.3 (02-05 @ 09:05)  HR: 68 (68 - 98)  BP: 86/46 (86/46 - 121/50)  BP(mean): --  RR: 18 (17 - 18)  SpO2: 100% (97% - 100%)    PHYSICAL EXAMINATION:  SKIN: no rashes  HEAD: NC/AT  EYES: PERRLA, EOMI  EARS: TM's intact  NOSE: no abnormalities  NECK:  Supple. No lymphadenopathy. Jugular venous pressure not elevated. Carotids equal.   HEART:   The cardiac impulse has a normal quality. Reg., Nl S1 and S2.  There are no murmurs, rubs or gallops noted  CHEST:  bilat ronchi  ABDOMEN:  Soft and nontender.   EXTREMITIES:  erythema, pos edema  NEURO: AAO x 3, no focal deficts       LABS:  Basic Metabolic Panel in AM (02.05.17 @ 07:14)    Sodium, Serum: 142 mmol/L    Potassium, Serum: 3.7 mmol/L    Chloride, Serum: 100 mmol/L    Carbon Dioxide, Serum: 37 mmol/L    Anion Gap, Serum: 5 mmol/L    Blood Urea Nitrogen, Serum: 23 mg/dL    Creatinine, Serum: 0.46 mg/dL    Glucose, Serum: 108 mg/dL    Calcium, Total Serum: 7.9 mg/dL    eGFR if Non : 93: Interpretative comment  The units for eGFR are ml/min/1.73m2 (normalized body surface area). The  eGFR is calculated from a serum creatinine using the CKD-EPI equation.  Other variables required for calculation are race, age and sex. Among  patients w93: ith chronic kidney disease (CKD), the eGFR is useful in  determining the stage of disease according to KDOQI CKD classification.  All eGFR results are reported numerically with the following  interpretation.          GFR                    With93:                  Without     (ml/min/1.73 m2)    Kidney Damage       Kidney Damage        >= 90                    Stage 1                     Normal        60-89                    Stage 2                     Decreased GFR        :      Stage 3                     Stage 3        15-29                    Stage 4                     Stage 4        < 15                      Stage 5                     Stage 5  Each stage of CKD assumes that the associated GFR level has been in  eff93: ect for at least 3 months. Determination of stages one and two (with  eGFR > 59 ml/min/m2) requires estimation of kidney damage for at least 3  months as defined by structural or functional abnormalities.  Limitations: All estimates of GFR will be les93: s accurate for patients at  extremes of muscle mass (including but not limited to frail elderly,  critically ill, or cancer patients), those with unusual diets, and those  with conditions associated with reduced secretion or extrarenal  elimination of93:  creatinine. The eGFR equation is not recommended for use  in patients with unstable creatinine levels. mL/min/1.73M2    eGFR if African American: 107 mL/min/1.73M2

## 2017-02-06 NOTE — PHYSICAL THERAPY INITIAL EVALUATION ADULT - PERTINENT HX OF CURRENT PROBLEM, REHAB EVAL
Pt. sent to  from Dr. Castellano's office for evaluation of increasing b/l LE swelling and cellulitis. Pt. admitted to  for bronchitis few weeks ago.

## 2017-02-06 NOTE — PHYSICAL THERAPY INITIAL EVALUATION ADULT - ADDITIONAL COMMENTS
Pt. reports pt. lives w/ daughter in apartment in house w/ no BENITO and "a few" steps to enter house through kitchen. Pt. ambulates w/out AD at baseline as per pt. and CM. Pt. unsure if pt. wears oxygen at home.

## 2017-02-07 LAB
ANION GAP SERPL CALC-SCNC: 5 MMOL/L — SIGNIFICANT CHANGE UP (ref 5–17)
BUN SERPL-MCNC: 27 MG/DL — HIGH (ref 7–23)
CALCIUM SERPL-MCNC: 8.1 MG/DL — LOW (ref 8.5–10.1)
CHLORIDE SERPL-SCNC: 101 MMOL/L — SIGNIFICANT CHANGE UP (ref 96–108)
CO2 SERPL-SCNC: 38 MMOL/L — HIGH (ref 22–31)
CREAT SERPL-MCNC: 0.4 MG/DL — LOW (ref 0.5–1.3)
GLUCOSE SERPL-MCNC: 100 MG/DL — HIGH (ref 70–99)
HCT VFR BLD CALC: 28.7 % — LOW (ref 34.5–45)
HGB BLD-MCNC: 8.9 G/DL — LOW (ref 11.5–15.5)
MCHC RBC-ENTMCNC: 29.7 PG — SIGNIFICANT CHANGE UP (ref 27–34)
MCHC RBC-ENTMCNC: 31.1 GM/DL — LOW (ref 32–36)
MCV RBC AUTO: 95.7 FL — SIGNIFICANT CHANGE UP (ref 80–100)
PLATELET # BLD AUTO: 118 K/UL — LOW (ref 150–400)
POTASSIUM SERPL-MCNC: 3.8 MMOL/L — SIGNIFICANT CHANGE UP (ref 3.5–5.3)
POTASSIUM SERPL-SCNC: 3.8 MMOL/L — SIGNIFICANT CHANGE UP (ref 3.5–5.3)
RBC # BLD: 3 M/UL — LOW (ref 3.8–5.2)
RBC # FLD: 14.6 % — HIGH (ref 10.3–14.5)
SODIUM SERPL-SCNC: 144 MMOL/L — SIGNIFICANT CHANGE UP (ref 135–145)
WBC # BLD: 4.2 K/UL — SIGNIFICANT CHANGE UP (ref 3.8–10.5)
WBC # FLD AUTO: 4.2 K/UL — SIGNIFICANT CHANGE UP (ref 3.8–10.5)

## 2017-02-07 RX ORDER — ALPRAZOLAM 0.25 MG
0.12 TABLET ORAL
Qty: 0 | Refills: 0 | COMMUNITY
Start: 2017-02-07

## 2017-02-07 RX ORDER — ALBUTEROL 90 UG/1
2 AEROSOL, METERED ORAL
Qty: 0 | Refills: 0 | COMMUNITY
Start: 2017-02-07

## 2017-02-07 RX ORDER — FUROSEMIDE 40 MG
40 TABLET ORAL DAILY
Qty: 0 | Refills: 0 | Status: DISCONTINUED | OUTPATIENT
Start: 2017-02-07 | End: 2017-02-08

## 2017-02-07 RX ORDER — LISINOPRIL 2.5 MG/1
1 TABLET ORAL
Qty: 0 | Refills: 0 | COMMUNITY
Start: 2017-02-07

## 2017-02-07 RX ORDER — FUROSEMIDE 40 MG
1 TABLET ORAL
Qty: 0 | Refills: 0 | COMMUNITY
Start: 2017-02-07

## 2017-02-07 RX ADMIN — MEROPENEM 200 MILLIGRAM(S): 1 INJECTION INTRAVENOUS at 05:26

## 2017-02-07 RX ADMIN — MEROPENEM 200 MILLIGRAM(S): 1 INJECTION INTRAVENOUS at 15:09

## 2017-02-07 RX ADMIN — CARVEDILOL PHOSPHATE 6.25 MILLIGRAM(S): 80 CAPSULE, EXTENDED RELEASE ORAL at 05:30

## 2017-02-07 RX ADMIN — MEROPENEM 200 MILLIGRAM(S): 1 INJECTION INTRAVENOUS at 21:01

## 2017-02-07 RX ADMIN — CARVEDILOL PHOSPHATE 6.25 MILLIGRAM(S): 80 CAPSULE, EXTENDED RELEASE ORAL at 17:26

## 2017-02-07 RX ADMIN — ALBUTEROL 2 PUFF(S): 90 AEROSOL, METERED ORAL at 19:19

## 2017-02-07 RX ADMIN — ALBUTEROL 2 PUFF(S): 90 AEROSOL, METERED ORAL at 13:21

## 2017-02-07 RX ADMIN — LISINOPRIL 2.5 MILLIGRAM(S): 2.5 TABLET ORAL at 05:30

## 2017-02-07 RX ADMIN — ESCITALOPRAM OXALATE 10 MILLIGRAM(S): 10 TABLET, FILM COATED ORAL at 11:17

## 2017-02-07 RX ADMIN — ALBUTEROL 2 PUFF(S): 90 AEROSOL, METERED ORAL at 07:51

## 2017-02-07 RX ADMIN — Medication 40 MILLIGRAM(S): at 12:19

## 2017-02-07 NOTE — DISCHARGE NOTE ADULT - PATIENT PORTAL LINK FT
“You can access the [employer] Patient Portal, offered by [facility], by registering with the following website: [email or web address]”

## 2017-02-07 NOTE — PROGRESS NOTE ADULT - SUBJECTIVE AND OBJECTIVE BOX
Subjective:    pat better, leg redness improving, swelling better.    MEDICATIONS  (STANDING):  escitalopram 10milliGRAM(s) Oral daily  carvedilol 6.25milliGRAM(s) Oral every 12 hours  ALBUTerol    90 MICROgram(s) HFA Inhaler 2Puff(s) Inhalation every 6 hours  furosemide   Injectable 40milliGRAM(s) IV Push daily  lisinopril 2.5milliGRAM(s) Oral daily  meropenem IVPB 500milliGRAM(s) IV Intermittent every 8 hours    MEDICATIONS  (PRN):  acetaminophen   Tablet 650milliGRAM(s) Oral every 6 hours PRN For Temp greater than 38 C (100.4 F)  ALPRAZolam 0.125milliGRAM(s) Oral every 8 hours PRN anxiety      Allergies    penicillin (Anaphylaxis)  penicillins (Unknown)    Intolerances        Vital Signs Last 24 Hrs  T(C): 36.7, Max: 36.7 (02-07 @ 07:59)  T(F): 98.1, Max: 98.1 (02-07 @ 07:59)  HR: 69 (65 - 100)  BP: 108/62 (94/54 - 131/90)  BP(mean): --  RR: 16 (16 - 18)  SpO2: 100% (100% - 100%)    PHYSICAL EXAMINATION:    NECK:  Supple. No lymphadenopathy. Jugular venous pressure not elevated. Carotids equal.   HEART:   The cardiac impulse has a normal quality. Reg., Nl S1 and S2.  There are no murmurs, rubs or gallops noted  CHEST:  Chest is clear to auscultation. Normal respiratory effort.  ABDOMEN:  Soft and nontender.   EXTREMITIES:  There is no edema.       LABS:                        8.9    4.2   )-----------( 118      ( 07 Feb 2017 07:16 )             28.7     07 Feb 2017 07:16    144    |  101    |  27     ----------------------------<  100    3.8     |  38     |  0.40     Ca    8.1        07 Feb 2017 07:16

## 2017-02-07 NOTE — DISCHARGE NOTE ADULT - HOSPITAL COURSE
PCP  Patient is a 82y old  Female who presents with a chief complaint of cellulitis (31 Jan 2017 01:53)  HPI:  83 y/o F PMHx significant for HTN, HLD, moderate persistent asthma, with recent hospitalization in 12/2016 where the patient was found to have a RSV Bronchiolitis with hypercapneic and hypoxemic respiratory failure requiring endotracheal intubation c/b a NSTEMI who was referred to the ED today from Dr. Ann' office for further evaluation of progressively worsening B/L LE swelling and erythema concerning for purulent cellulitis. (30 Jan 2017 21:57).   Hospital stay: pt treated for MDR E.coli UTI and completed her abx course, had rectus sheath hematoma and was not able to be on AC- got IVC filter placed  Review of system- All 10 systems reviewed and is as per HPI otherwise negative.     T(C): 36.7, Max: 36.7 (02-07 @ 07:59)  HR: 69 (65 - 100)  BP: 108/62 (94/54 - 131/90)  RR: 16 (16 - 18)  SpO2: 100% (100% - 100%)  Wt(kg): --  LABS:                        8.9    4.2   )-----------( 118      ( 07 Feb 2017 07:16 )             28.7     07 Feb 2017 07:16    144    |  101    |  27     ----------------------------<  100    3.8     |  38     |  0.40     Ca    8.1        07 Feb 2017 07:16    PHYSICAL EXAM:    GENERAL: NAD, well-groomed, well-developed  HEAD:  Atraumatic, Normocephalic  EYES: EOMI, PERRLA, conjunctiva and sclera clear  HEENT: Moist mucous membranes  NECK: Supple, No JVD  NERVOUS SYSTEM:  Alert & Oriented X3, Motor Strength 5/5 B/L upper and lower extremities; DTRs 2+ intact and symmetric  CHEST/LUNG: Clear to auscultation bilaterally; No rales, rhonchi, wheezing, or rubs  HEART: Regular rate and rhythm; No murmurs, rubs, or gallops  ABDOMEN: Soft, Nontender, Nondistended; Bowel sounds present  GENITOURINARY- Voiding, no palpable bladder  EXTREMITIES:  2+ Peripheral Pulses, 2-3+edema, cellulitis resolving  MUSCULOSKELTAL- No muscle tenderness, Muscle tone normal, No joint tenderness, no Joint swelling, Joint range of motion-normal  SKIN-no rash, no lesion  CNS- alert, oriented X3, non focal     escitalopram 10milliGRAM(s) Oral daily  carvedilol 6.25milliGRAM(s) Oral every 12 hours  acetaminophen   Tablet 650milliGRAM(s) Oral every 6 hours PRN  ALBUTerol    90 MICROgram(s) HFA Inhaler 2Puff(s) Inhalation every 6 hours  ALPRAZolam 0.125milliGRAM(s) Oral every 8 hours PRN  lisinopril 2.5milliGRAM(s) Oral daily  meropenem IVPB 500milliGRAM(s) IV Intermittent every 8 hours  furosemide    Tablet 40milliGRAM(s) Oral daily    #DVT s/p IVC filter  Rectus sheath hematoma improving  Not on AC  #UTI MDR E.coli  Completed IV Meropenem  #Asthma  Outpatient f/u with DR Castellano  #Acute on chronic diastolic CHF  Outpatient f/u with DR Rogel  #Dispo- subacute when bed is available

## 2017-02-07 NOTE — DISCHARGE NOTE ADULT - MEDICATION SUMMARY - MEDICATIONS TO TAKE
I will START or STAY ON the medications listed below when I get home from the hospital:    aspirin 81 mg oral tablet  -- 1 tab(s) by mouth once a day  -- Indication: For Cardiac    lisinopril 2.5 mg oral tablet  -- 1 tab(s) by mouth once a day  -- Indication: For HTN (hypertension)    escitalopram 10 mg oral tablet  -- 1 tab(s) by mouth once a day  -- Indication: For Depression    simvastatin 40 mg oral tablet  -- 1 tab(s) by mouth once a day (at bedtime)  -- Indication: For Hyperlipidemia, unspecified hyperlipidemia type    ALPRAZolam  -- 0.125 milligram(s) by mouth every 8 hours, As Needed  -- for anxiety  -- Indication: For Anxiety    carvedilol 6.25 mg oral tablet  -- 1 tab(s) by mouth 2 times a day  -- Indication: For Essential hypertension    albuterol 90 mcg/inh inhalation aerosol  -- 2 puff(s) inhaled every 6 hours  -- Indication: For Moderate persistent asthma without complication    furosemide 40 mg oral tablet  -- 1 tab(s) by mouth once a day  -- Indication: For Cellulitis of lower extremity, unspecified laterality

## 2017-02-07 NOTE — PROGRESS NOTE ADULT - ASSESSMENT
PROBLEMS:    Cellulitis of lower extremity, unspecified laterality  DVT lower extremity, distal, acute, right  Moderate persistent asthma without complication  Essential hypertension  Anxiety    PLAN:    iv merpenem  s/q lovenox  aerosols  pulmonary stable  supportive care  continue current rx

## 2017-02-07 NOTE — DISCHARGE NOTE ADULT - CARE PLAN
Principal Discharge DX:	DVT, lower extremity, distal, acute, right  Goal:	subacute rehab  Instructions for follow-up, activity and diet:	as above

## 2017-02-07 NOTE — DISCHARGE NOTE ADULT - CARE PROVIDER_API CALL
Xochitl Inman), Cardiovascular Disease; Internal Medicine  [address] [place] [address]  Phone: [phone]  Fax: [phone]    Dao Castellano), Critical Care Medicine; Internal Medicine; Pulmonary Disease; Sleep Medicine  [address] [place] [address]  Phone: [phone]  Fax: [phone]

## 2017-02-08 VITALS
OXYGEN SATURATION: 100 % | TEMPERATURE: 98 F | HEART RATE: 64 BPM | DIASTOLIC BLOOD PRESSURE: 51 MMHG | RESPIRATION RATE: 18 BRPM | SYSTOLIC BLOOD PRESSURE: 111 MMHG

## 2017-02-08 RX ORDER — ALPRAZOLAM 0.25 MG
0.12 TABLET ORAL ONCE
Qty: 0 | Refills: 0 | Status: DISCONTINUED | OUTPATIENT
Start: 2017-02-08 | End: 2017-02-08

## 2017-02-08 RX ADMIN — LISINOPRIL 2.5 MILLIGRAM(S): 2.5 TABLET ORAL at 05:17

## 2017-02-08 RX ADMIN — ALBUTEROL 2 PUFF(S): 90 AEROSOL, METERED ORAL at 12:30

## 2017-02-08 RX ADMIN — CARVEDILOL PHOSPHATE 6.25 MILLIGRAM(S): 80 CAPSULE, EXTENDED RELEASE ORAL at 05:17

## 2017-02-08 RX ADMIN — Medication 40 MILLIGRAM(S): at 05:17

## 2017-02-08 RX ADMIN — Medication 0.12 MILLIGRAM(S): at 14:29

## 2017-02-08 RX ADMIN — ESCITALOPRAM OXALATE 10 MILLIGRAM(S): 10 TABLET, FILM COATED ORAL at 11:29

## 2017-02-08 RX ADMIN — MEROPENEM 200 MILLIGRAM(S): 1 INJECTION INTRAVENOUS at 05:16

## 2017-02-08 NOTE — PROGRESS NOTE ADULT - SUBJECTIVE AND OBJECTIVE BOX
Subjective:    pat sitting in chair, no new complaint.     MEDICATIONS  (STANDING):  escitalopram 10milliGRAM(s) Oral daily  carvedilol 6.25milliGRAM(s) Oral every 12 hours  ALBUTerol    90 MICROgram(s) HFA Inhaler 2Puff(s) Inhalation every 6 hours  lisinopril 2.5milliGRAM(s) Oral daily  furosemide    Tablet 40milliGRAM(s) Oral daily    MEDICATIONS  (PRN):  acetaminophen   Tablet 650milliGRAM(s) Oral every 6 hours PRN For Temp greater than 38 C (100.4 F)      Allergies    penicillin (Anaphylaxis)  penicillins (Unknown)    Intolerances        Vital Signs Last 24 Hrs  T(C): 36.8, Max: 36.9 (02-07 @ 20:10)  T(F): 98.2, Max: 98.5 (02-07 @ 20:10)  HR: 65 (64 - 75)  BP: 115/53 (95/50 - 120/77)  BP(mean): --  RR: 18 (18 - 18)  SpO2: 100% (100% - 100%)    PHYSICAL EXAMINATION:    NECK:  Supple. No lymphadenopathy. Jugular venous pressure not elevated. Carotids equal.   HEART:   The cardiac impulse has a normal quality. Reg., Nl S1 and S2.  There are no murmurs, rubs or gallops noted  CHEST:  Chest is clear to auscultation. Normal respiratory effort.  ABDOMEN:  Soft and nontender.   EXTREMITIES:  There is no edema.       LABS:                        8.9    4.2   )-----------( 118      ( 07 Feb 2017 07:16 )             28.7     07 Feb 2017 07:16    144    |  101    |  27     ----------------------------<  100    3.8     |  38     |  0.40     Ca    8.1        07 Feb 2017 07:16

## 2017-02-08 NOTE — PROGRESS NOTE ADULT - PROBLEM SELECTOR PLAN 5
MDR - on Meropenem  1 more day of IV antibiotics and ok to discharge on 2/4 if remains afebril
MDR - on Meropenem  1 more day of IV antibiotics and ok to discharge on 2/4 if remains afebrile
~cont. Escitalopram 10mg po daily
MDR - on Meropenem  1 more day of IV antibiotics and ok to discharge on 2/4 if remains afebrile
MDR E. coli  Completed treatment with IV Meropenem
~cont. Escitalopram 10mg po daily
MDR - on Meropenem

## 2017-02-08 NOTE — PROGRESS NOTE ADULT - PROBLEM SELECTOR PROBLEM 4
Essential hypertension
Hematoma
Hematoma
Essential hypertension
Hematoma

## 2017-02-08 NOTE — PROGRESS NOTE ADULT - PROBLEM SELECTOR PROBLEM 1
Cellulitis of lower extremity, unspecified laterality
DVT, lower extremity, distal, acute, right

## 2017-02-08 NOTE — PROGRESS NOTE ADULT - PROBLEM SELECTOR PROBLEM 5
Anxiety
Urinary tract infection, site unspecified
Urinary tract infection, site unspecified
Anxiety
Urinary tract infection, site unspecified

## 2017-02-08 NOTE — PROGRESS NOTE ADULT - PROBLEM SELECTOR PLAN 3
~cont. to monitor  ~nebs prn
BP optimal on coreg and lisinopril
BP optimal on coreg and lisinopril
~cont. to monitor  ~nebs prn
BP optimal on coreg and lisinopril

## 2017-02-08 NOTE — PROGRESS NOTE ADULT - PROVIDER SPECIALTY LIST ADULT
Cardiology
Hospitalist
Infectious Disease
Pulmonology
Hospitalist
Hospitalist

## 2017-02-08 NOTE — PROGRESS NOTE ADULT - PROBLEM SELECTOR PROBLEM 3
Moderate persistent asthma without complication
Essential hypertension
Moderate persistent asthma without complication
Essential hypertension

## 2017-02-08 NOTE — PROGRESS NOTE ADULT - PROBLEM SELECTOR PROBLEM 2
DVT, lower extremity, distal, acute, right
Cellulitis of lower extremity, unspecified laterality
DVT, lower extremity, distal, acute, right
Cellulitis of lower extremity, unspecified laterality

## 2017-02-08 NOTE — PROGRESS NOTE ADULT - SUBJECTIVE AND OBJECTIVE BOX
Patient is a 83 y/o old  Female who presents with a chief complaint of cellulitis (2017 01:53)    HPI:  83 y/o Female with h/o  HTN, HLD, CAD s/p NSTEMI, moderate asthma, recent hospitalization in 2016 for RSV Bronchiolitis with hypercapneic and hypoxemic respiratory failure that required endotracheal intubation was admitted on  progressively worsening B/L LE swelling and erythema. (2017 21:57)    Alert and verbal  OOB to chair  Mild confusion  No new complaints    MEDICATIONS  (STANDING):  escitalopram 10milliGRAM(s) Oral daily  carvedilol 6.25milliGRAM(s) Oral every 12 hours  ALBUTerol    90 MICROgram(s) HFA Inhaler 2Puff(s) Inhalation every 6 hours  lisinopril 2.5milliGRAM(s) Oral daily  furosemide    Tablet 40milliGRAM(s) Oral daily  ALPRAZolam 0.125milliGRAM(s) Oral once    MEDICATIONS  (PRN):  acetaminophen   Tablet 650milliGRAM(s) Oral every 6 hours PRN For Temp greater than 38 C (100.4 F)      Vital Signs Last 24 Hrs  T(C): 36.8, Max: 36.9 (- @ 20:10)  T(F): 98.2, Max: 98.5 (- @ 20:10)  HR: 66 (64 - 75)  BP: 115/53 (95/50 - 120/77)  BP(mean): --  RR: 18 (18 - 18)  SpO2: 100% (100% - 100%)    Physical Exam:      Constitutional: frail looking  HEENT: NC/AT, EOMI, PERRLA  Neck: supple  Back: no tenderness  Respiratory: clear  Cardiovascular: S1S2 regular, no murmurs  Abdomen: soft, not tender, not distended, positive BS  Genitourinary: deferred  Rectal: deferred  Musculoskeletal: no muscle tenderness, no joint swelling or tenderness  Extremities: has pedal edema and tenderness; Left anterior shin erythema, edema and broken skin with scant yellow discharge  Neurological: AxOx3, moving all extremities, no focal deficits  Skin: leg rashe    Labs:    2017 07:14    142    |  100    |  23     ----------------------------<  108    3.7     |  37     |  0.46     Ca    7.9        2017 07:14               9.2    6.0   )-----------( 158      ( 2017 04:30 )             28.6     2017 04:29    137    |  98     |  23     ----------------------------<  90     3.7     |  34     |  0.66     Ca    8.0        2017 04:29         Vancomycin Level, Trough: 14.9 ug/mL ( @ 04:30)      Cultures:                       9.4    10.7  )-----------( 186      ( 2017 07:28 )             29.0     2017 07:28    137    |  98     |  22     ----------------------------<  108    3.7     |  33     |  0.67     Ca    8.1        2017 07:28  Mg     1.7       2017 06:55    TPro  4.9    /  Alb  2.3    /  TBili  0.5    /  DBili  x      /  AST  20     /  ALT  18     /  AlkPhos  70     2017 06:55           Cultures:                       10.6   8.2   )-----------( 231      ( 2017 17:17 )             32.6     2017 06:55    137    |  98     |  16     ----------------------------<  122    3.3     |  32     |  0.55     Ca    7.9        2017 06:55  Mg     1.7       2017 06:55    TPro  4.9    /  Alb  2.3    /  TBili  0.5    /  DBili  x      /  AST  20     /  ALT  18     /  AlkPhos  70     2017 06:55     LIVER FUNCTIONS - ( 2017 06:55 )  Alb: 2.3 g/dL / Pro: 4.9 gm/dL / ALK PHOS: 70 U/L / ALT: 18 U/L / AST: 20 U/L / GGT: x           Urinalysis Basic - ( 2017 21:10 )    Color: Yellow / Appearance: very cloudy / S.025 / pH: x  Gluc: x / Ketone: Small  / Bili: Negative / Urobili: Negative mg/dL   Blood: x / Protein: 30 mg/dL / Nitrite: Positive   Leuk Esterase: Moderate / RBC: 6-10 /HPF / WBC >50   Sq Epi: x / Non Sq Epi: Moderate / Bacteria: Many    Culture - Urine (17 @ 21:10)    -  Cefepime: R >16    -  Ceftazidime: R >16    -  Ceftriaxone: R >32    -  Ertapenem: S <=1    -  Gentamicin: S <=4    -  Nitrofurantoin: S <=32    -  Amikacin: S <=16    -  Cefoxitin: S <=8    -  Ciprofloxacin: R >2    -  Imipenem: S <=1    -  Ampicillin/Sulbactam: R 16/8    -  Cefazolin: R >16    -  Tobramycin: S <=4    -  Ampicillin: R >16    -  Aztreonam: R >16    -  Levofloxacin: R >4    -  Meropenem: S <=1    -  Piperacillin/Tazobactam: R <=16    -  Trimethoprim/Sulfamethoxazole: S <=2/38    Specimen Source: .Urine None    Culture Results:   >100,000 CFU/ml Escherichia coli (multi drug resistant)    Organism Identification: Escherichia coli (multi drug resistant)    Organism: Escherichia coli (multi drug resistant)    Method Type: BILL        Venous doppler: Negative for DVT in the left leg.      CXR: Clear lungs. No effusions or pneumothorax.

## 2017-02-08 NOTE — PROGRESS NOTE ADULT - ASSESSMENT
PROBLEMS:    Cellulitis of lower extremity, unspecified laterality  DVT lower extremity, distal, acute, right  Moderate persistent asthma without complication  Essential hypertension  Anxiety    PLAN:    continue current rx  iv merpenem  s/q lovenox  aerosols  pulmonary stable  supportive care  discharge planning  DVT prophylasix

## 2017-02-13 DIAGNOSIS — I25.2 OLD MYOCARDIAL INFARCTION: ICD-10-CM

## 2017-02-13 DIAGNOSIS — I50.23 ACUTE ON CHRONIC SYSTOLIC (CONGESTIVE) HEART FAILURE: ICD-10-CM

## 2017-02-13 DIAGNOSIS — N39.0 URINARY TRACT INFECTION, SITE NOT SPECIFIED: ICD-10-CM

## 2017-02-13 DIAGNOSIS — Z16.24 RESISTANCE TO MULTIPLE ANTIBIOTICS: ICD-10-CM

## 2017-02-13 DIAGNOSIS — L03.90 CELLULITIS, UNSPECIFIED: ICD-10-CM

## 2017-02-13 DIAGNOSIS — B96.20 UNSPECIFIED ESCHERICHIA COLI [E. COLI] AS THE CAUSE OF DISEASES CLASSIFIED ELSEWHERE: ICD-10-CM

## 2017-02-13 DIAGNOSIS — L03.116 CELLULITIS OF LEFT LOWER LIMB: ICD-10-CM

## 2017-02-13 DIAGNOSIS — M79.81 NONTRAUMATIC HEMATOMA OF SOFT TISSUE: ICD-10-CM

## 2017-02-13 DIAGNOSIS — L30.8 OTHER SPECIFIED DERMATITIS: ICD-10-CM

## 2017-02-13 DIAGNOSIS — F41.9 ANXIETY DISORDER, UNSPECIFIED: ICD-10-CM

## 2017-02-13 DIAGNOSIS — Z87.891 PERSONAL HISTORY OF NICOTINE DEPENDENCE: ICD-10-CM

## 2017-02-13 DIAGNOSIS — L03.115 CELLULITIS OF RIGHT LOWER LIMB: ICD-10-CM

## 2017-02-13 DIAGNOSIS — I10 ESSENTIAL (PRIMARY) HYPERTENSION: ICD-10-CM

## 2017-02-13 DIAGNOSIS — I82.431 ACUTE EMBOLISM AND THROMBOSIS OF RIGHT POPLITEAL VEIN: ICD-10-CM

## 2017-02-13 DIAGNOSIS — J45.40 MODERATE PERSISTENT ASTHMA, UNCOMPLICATED: ICD-10-CM

## 2017-02-13 DIAGNOSIS — E78.5 HYPERLIPIDEMIA, UNSPECIFIED: ICD-10-CM

## 2017-07-28 NOTE — ED PROVIDER NOTE - CHPI ED SYMPTOMS NEG
no shortness of breath/no fever/no back pain/no diaphoresis/no chills/no nausea/no cough/no dizziness/no vomiting full weight-bearing

## 2019-07-04 ENCOUNTER — INPATIENT (INPATIENT)
Facility: HOSPITAL | Age: 84
LOS: 5 days | Discharge: NURSING, REHAB OR LONG-TERM CARE | DRG: 871 | End: 2019-07-10
Attending: STUDENT IN AN ORGANIZED HEALTH CARE EDUCATION/TRAINING PROGRAM | Admitting: INTERNAL MEDICINE
Payer: MEDICARE

## 2019-07-04 VITALS
SYSTOLIC BLOOD PRESSURE: 189 MMHG | OXYGEN SATURATION: 97 % | WEIGHT: 149.91 LBS | HEIGHT: 65 IN | HEART RATE: 107 BPM | TEMPERATURE: 98 F | DIASTOLIC BLOOD PRESSURE: 105 MMHG | RESPIRATION RATE: 26 BRPM

## 2019-07-04 DIAGNOSIS — J96.02 ACUTE RESPIRATORY FAILURE WITH HYPERCAPNIA: ICD-10-CM

## 2019-07-04 LAB
ALBUMIN SERPL ELPH-MCNC: 3.5 G/DL — SIGNIFICANT CHANGE UP (ref 3.3–5)
ALP SERPL-CCNC: 121 U/L — HIGH (ref 40–120)
ALT FLD-CCNC: 20 U/L — SIGNIFICANT CHANGE UP (ref 12–78)
ANION GAP SERPL CALC-SCNC: 7 MMOL/L — SIGNIFICANT CHANGE UP (ref 5–17)
APTT BLD: 24.9 SEC — LOW (ref 28.5–37)
AST SERPL-CCNC: 16 U/L — SIGNIFICANT CHANGE UP (ref 15–37)
BASE EXCESS BLDA CALC-SCNC: -1.7 MMOL/L — SIGNIFICANT CHANGE UP (ref -2–2)
BASE EXCESS BLDA CALC-SCNC: -2.2 MMOL/L — LOW (ref -2–2)
BILIRUB SERPL-MCNC: 0.7 MG/DL — SIGNIFICANT CHANGE UP (ref 0.2–1.2)
BLOOD GAS COMMENTS ARTERIAL: SIGNIFICANT CHANGE UP
BUN SERPL-MCNC: 26 MG/DL — HIGH (ref 7–23)
CALCIUM SERPL-MCNC: 8.4 MG/DL — LOW (ref 8.5–10.1)
CHLORIDE SERPL-SCNC: 104 MMOL/L — SIGNIFICANT CHANGE UP (ref 96–108)
CO2 SERPL-SCNC: 27 MMOL/L — SIGNIFICANT CHANGE UP (ref 22–31)
CREAT SERPL-MCNC: 0.77 MG/DL — SIGNIFICANT CHANGE UP (ref 0.5–1.3)
GLUCOSE SERPL-MCNC: 250 MG/DL — HIGH (ref 70–99)
HCO3 BLDA-SCNC: 20 MMOL/L — LOW (ref 23–27)
HCO3 BLDA-SCNC: 23 MMOL/L — SIGNIFICANT CHANGE UP (ref 23–27)
HCT VFR BLD CALC: 45.8 % — HIGH (ref 34.5–45)
HGB BLD-MCNC: 14.9 G/DL — SIGNIFICANT CHANGE UP (ref 11.5–15.5)
INR BLD: 1.08 RATIO — SIGNIFICANT CHANGE UP (ref 0.88–1.16)
LACTATE SERPL-SCNC: 0.8 MMOL/L — SIGNIFICANT CHANGE UP (ref 0.7–2)
MAGNESIUM SERPL-MCNC: 2.1 MG/DL — SIGNIFICANT CHANGE UP (ref 1.6–2.6)
MCHC RBC-ENTMCNC: 29.6 PG — SIGNIFICANT CHANGE UP (ref 27–34)
MCHC RBC-ENTMCNC: 32.5 GM/DL — SIGNIFICANT CHANGE UP (ref 32–36)
MCV RBC AUTO: 91.1 FL — SIGNIFICANT CHANGE UP (ref 80–100)
NRBC # BLD: 0 /100 WBCS — SIGNIFICANT CHANGE UP (ref 0–0)
NT-PROBNP SERPL-SCNC: 511 PG/ML — HIGH (ref 0–450)
PCO2 BLDA: 36 MMHG — SIGNIFICANT CHANGE UP (ref 32–46)
PCO2 BLDA: 75 MMHG — CRITICAL HIGH (ref 32–46)
PH BLDA: 7.16 — CRITICAL LOW (ref 7.35–7.45)
PH BLDA: 7.41 — SIGNIFICANT CHANGE UP (ref 7.35–7.45)
PLATELET # BLD AUTO: 193 K/UL — SIGNIFICANT CHANGE UP (ref 150–400)
PO2 BLDA: 172 MMHG — HIGH (ref 74–108)
PO2 BLDA: 56 MMHG — LOW (ref 74–108)
POTASSIUM SERPL-MCNC: 3.9 MMOL/L — SIGNIFICANT CHANGE UP (ref 3.5–5.3)
POTASSIUM SERPL-SCNC: 3.9 MMOL/L — SIGNIFICANT CHANGE UP (ref 3.5–5.3)
PROT SERPL-MCNC: 6.6 G/DL — SIGNIFICANT CHANGE UP (ref 6–8.3)
PROTHROM AB SERPL-ACNC: 12.3 SEC — SIGNIFICANT CHANGE UP (ref 10–12.9)
RBC # BLD: 5.03 M/UL — SIGNIFICANT CHANGE UP (ref 3.8–5.2)
RBC # FLD: 12.8 % — SIGNIFICANT CHANGE UP (ref 10.3–14.5)
SAO2 % BLDA: 90 % — LOW (ref 92–96)
SAO2 % BLDA: 99 % — HIGH (ref 92–96)
SODIUM SERPL-SCNC: 138 MMOL/L — SIGNIFICANT CHANGE UP (ref 135–145)
TROPONIN I SERPL-MCNC: 0.07 NG/ML — HIGH (ref 0.01–0.04)
WBC # BLD: 20.51 K/UL — HIGH (ref 3.8–10.5)
WBC # FLD AUTO: 20.51 K/UL — HIGH (ref 3.8–10.5)

## 2019-07-04 PROCEDURE — 71045 X-RAY EXAM CHEST 1 VIEW: CPT | Mod: 26

## 2019-07-04 PROCEDURE — 93010 ELECTROCARDIOGRAM REPORT: CPT

## 2019-07-04 PROCEDURE — 99223 1ST HOSP IP/OBS HIGH 75: CPT | Mod: GC,AI

## 2019-07-04 PROCEDURE — 99285 EMERGENCY DEPT VISIT HI MDM: CPT

## 2019-07-04 RX ORDER — IPRATROPIUM/ALBUTEROL SULFATE 18-103MCG
3 AEROSOL WITH ADAPTER (GRAM) INHALATION ONCE
Refills: 0 | Status: COMPLETED | OUTPATIENT
Start: 2019-07-04 | End: 2019-07-04

## 2019-07-04 RX ORDER — ALBUTEROL 90 UG/1
2.5 AEROSOL, METERED ORAL
Refills: 0 | Status: COMPLETED | OUTPATIENT
Start: 2019-07-04 | End: 2019-07-04

## 2019-07-04 RX ORDER — FUROSEMIDE 40 MG
40 TABLET ORAL ONCE
Refills: 0 | Status: COMPLETED | OUTPATIENT
Start: 2019-07-04 | End: 2019-07-04

## 2019-07-04 RX ADMIN — Medication 40 MILLIGRAM(S): at 21:17

## 2019-07-04 RX ADMIN — ALBUTEROL 2.5 MILLIGRAM(S): 90 AEROSOL, METERED ORAL at 21:05

## 2019-07-04 RX ADMIN — ALBUTEROL 2.5 MILLIGRAM(S): 90 AEROSOL, METERED ORAL at 21:01

## 2019-07-04 RX ADMIN — ALBUTEROL 2.5 MILLIGRAM(S): 90 AEROSOL, METERED ORAL at 21:06

## 2019-07-04 RX ADMIN — Medication 3 MILLILITER(S): at 20:55

## 2019-07-04 NOTE — ED ADULT NURSE REASSESSMENT NOTE - NURSING ED SKIN COLOR
Problem: Perioperative Period (Adult)  Goal: Signs and Symptoms of Listed Potential Problems Will be Absent or Manageable (Perioperative Period)  Outcome: Ongoing (interventions implemented as appropriate)      
bilateral lower ext lymphedema

## 2019-07-04 NOTE — ED ADULT NURSE NOTE - ED CARDIAC CAPILLARY REFILL
Nursing Note by Casimiro Jones CMA at 17 01:17 PM     Author:  Casimiro Jones CMA Service:  (none) Author Type:  Certified Medical Assistant     Filed:  17 01:18 PM Encounter Date:  3/23/2017 Status:  Signed     :  Casimiro Jones CMA (Certified Medical Assistant)            1:17 PM  Chief Complaint     Patient presents with     • Cough      dry cough, sore abdominal muscles x1week     Patient brought to exam room.  Electronically Signed by:    Casimiro Jones CMA , 3/23/2017  Patient's name,  and allergies verified with[AR1.1T] patient by student[AR1.1M].[AR1.1T] Vitals also taken by student.[AR1.1M]   Last visit with LANA MARIE was on 2016 at  1:55 PM [facility]  [facility] on 2016 at  1:[facility] PM [facility]  Match done based on reference date of today 3/23/17  Gunner Amaral was offered treatment for pain control:[AR1.1T] No. Will await provider evaluation.[AR1.1M]        Revision History        User Key Date/Time User Provider Type Action    > AR1.1 17 01:18 PM Casimiro Jones CMA Certified Medical Assistant Sign    M - Manual, T - Template             3 seconds or more

## 2019-07-04 NOTE — ED ADULT NURSE NOTE - NSIMPLEMENTINTERV_GEN_ALL_ED
Implemented All Fall with Harm Risk Interventions:  [employer] to call system. Call bell, personal items and telephone within reach. Instruct patient to call for assistance. Room bathroom lighting operational. Non-slip footwear when patient is off stretcher. Physically safe environment: no spills, clutter or unnecessary equipment. Stretcher in lowest position, wheels locked, appropriate side rails in place. Provide visual cue, wrist band, yellow gown, etc. Monitor gait and stability. Monitor for mental status changes and reorient to person, place, and time. Review medications for side effects contributing to fall risk. Reinforce activity limits and safety measures with patient and family. Provide visual clues: red socks.

## 2019-07-04 NOTE — ED PROVIDER NOTE - OBJECTIVE STATEMENT
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 Per EMS patient received Lasix and Solumedrol PTA. No additional history is available at this time as a result of patients condition.

## 2019-07-04 NOTE — ED ADULT NURSE NOTE - OBJECTIVE STATEMENT
patient came in ED from Rehab facility [facility]. as per EMS the call was respiratory distress on BiPAP. labored respiration  noted. patient came in ED from Rehab facility [facility]. as per EMS the call was respiratory distress on BiPAP. labored respiration  noted. + wheezing on auscultation. ST on cardiac monitor. afebrile, rectal temp 98F. Dr Mojica s/e patient on the bedside. Resp called to bedside as well.

## 2019-07-04 NOTE — ED ADULT TRIAGE NOTE - CHIEF COMPLAINT QUOTE
Pt. brought to ED via EMS from nursing home for respiratory distress. Per EMS patient given Solu-medrol and albuterol at 1800. Pt. placed on CPAP by EMS.

## 2019-07-04 NOTE — ED ADULT NURSE NOTE - PMH
Anxiety disorder    Bronchitis    Chronic embolism and thrombosis of unspecified deep veins of right lower extremity    Cognitive communication deficit    Edema    Essential (primary) hypertension    Hypokalemia    Insomnia    Major depressive disorder    Muscle weakness (generalized)    NSTEMI (non-ST elevated myocardial infarction)    Pain    Unspecified diastolic (congestive) heart failure

## 2019-07-05 DIAGNOSIS — Z00.00 ENCOUNTER FOR GENERAL ADULT MEDICAL EXAMINATION WITHOUT ABNORMAL FINDINGS: ICD-10-CM

## 2019-07-05 DIAGNOSIS — I50.30 UNSPECIFIED DIASTOLIC (CONGESTIVE) HEART FAILURE: ICD-10-CM

## 2019-07-05 DIAGNOSIS — J45.909 UNSPECIFIED ASTHMA, UNCOMPLICATED: ICD-10-CM

## 2019-07-05 DIAGNOSIS — J96.02 ACUTE RESPIRATORY FAILURE WITH HYPERCAPNIA: ICD-10-CM

## 2019-07-05 DIAGNOSIS — Z29.9 ENCOUNTER FOR PROPHYLACTIC MEASURES, UNSPECIFIED: ICD-10-CM

## 2019-07-05 DIAGNOSIS — E87.6 HYPOKALEMIA: ICD-10-CM

## 2019-07-05 LAB
ANION GAP SERPL CALC-SCNC: 8 MMOL/L — SIGNIFICANT CHANGE UP (ref 5–17)
APTT BLD: 139.5 SEC — CRITICAL HIGH (ref 27.5–36.3)
BASE EXCESS BLDA CALC-SCNC: 0.5 MMOL/L — SIGNIFICANT CHANGE UP (ref -2–2)
BLOOD GAS COMMENTS ARTERIAL: SIGNIFICANT CHANGE UP
BUN SERPL-MCNC: 25 MG/DL — HIGH (ref 7–23)
CALCIUM SERPL-MCNC: 8.5 MG/DL — SIGNIFICANT CHANGE UP (ref 8.5–10.1)
CHLORIDE SERPL-SCNC: 99 MMOL/L — SIGNIFICANT CHANGE UP (ref 96–108)
CK MB BLD-MCNC: 6.6 % — HIGH (ref 0–3.5)
CK MB BLD-MCNC: 7.9 % — HIGH (ref 0–3.5)
CK MB CFR SERPL CALC: 7.4 NG/ML — HIGH (ref 0–3.6)
CK MB CFR SERPL CALC: 9.5 NG/ML — HIGH (ref 0–3.6)
CK SERPL-CCNC: 143 U/L — SIGNIFICANT CHANGE UP (ref 26–192)
CO2 SERPL-SCNC: 32 MMOL/L — HIGH (ref 22–31)
CREAT SERPL-MCNC: 0.97 MG/DL — SIGNIFICANT CHANGE UP (ref 0.5–1.3)
GLUCOSE SERPL-MCNC: 234 MG/DL — HIGH (ref 70–99)
HCO3 BLDA-SCNC: 24 MMOL/L — SIGNIFICANT CHANGE UP (ref 23–27)
HCT VFR BLD CALC: 44.2 % — SIGNIFICANT CHANGE UP (ref 34.5–45)
HGB BLD-MCNC: 14.8 G/DL — SIGNIFICANT CHANGE UP (ref 11.5–15.5)
HOROWITZ INDEX BLDA+IHG-RTO: 50 — SIGNIFICANT CHANGE UP
INR BLD: 1.27 RATIO — HIGH (ref 0.88–1.16)
MAGNESIUM SERPL-MCNC: 2 MG/DL — SIGNIFICANT CHANGE UP (ref 1.6–2.6)
MCHC RBC-ENTMCNC: 29.4 PG — SIGNIFICANT CHANGE UP (ref 27–34)
MCHC RBC-ENTMCNC: 33.5 GM/DL — SIGNIFICANT CHANGE UP (ref 32–36)
MCV RBC AUTO: 87.9 FL — SIGNIFICANT CHANGE UP (ref 80–100)
MRSA PCR RESULT.: SIGNIFICANT CHANGE UP
NRBC # BLD: 0 /100 WBCS — SIGNIFICANT CHANGE UP (ref 0–0)
PCO2 BLDA: 45 MMHG — SIGNIFICANT CHANGE UP (ref 32–46)
PH BLDA: 7.36 — SIGNIFICANT CHANGE UP (ref 7.35–7.45)
PHOSPHATE SERPL-MCNC: 2.5 MG/DL — SIGNIFICANT CHANGE UP (ref 2.5–4.5)
PLATELET # BLD AUTO: 135 K/UL — LOW (ref 150–400)
PO2 BLDA: 214 MMHG — HIGH (ref 74–108)
POTASSIUM SERPL-MCNC: 3.6 MMOL/L — SIGNIFICANT CHANGE UP (ref 3.5–5.3)
POTASSIUM SERPL-SCNC: 3.6 MMOL/L — SIGNIFICANT CHANGE UP (ref 3.5–5.3)
PROCALCITONIN SERPL-MCNC: 0.47 NG/ML — HIGH (ref 0–0.04)
PROTHROM AB SERPL-ACNC: 14.5 SEC — HIGH (ref 10–12.9)
RAPID RVP RESULT: DETECTED
RBC # BLD: 5.03 M/UL — SIGNIFICANT CHANGE UP (ref 3.8–5.2)
RBC # FLD: 12.7 % — SIGNIFICANT CHANGE UP (ref 10.3–14.5)
RV+EV RNA SPEC QL NAA+PROBE: DETECTED
S AUREUS DNA NOSE QL NAA+PROBE: DETECTED
SAO2 % BLDA: 100 % — HIGH (ref 92–96)
SODIUM SERPL-SCNC: 139 MMOL/L — SIGNIFICANT CHANGE UP (ref 135–145)
TROPONIN I SERPL-MCNC: 1.48 NG/ML — HIGH (ref 0.01–0.04)
TROPONIN I SERPL-MCNC: 1.89 NG/ML — HIGH (ref 0.01–0.04)
WBC # BLD: 11.69 K/UL — HIGH (ref 3.8–10.5)
WBC # FLD AUTO: 11.69 K/UL — HIGH (ref 3.8–10.5)

## 2019-07-05 PROCEDURE — 99233 SBSQ HOSP IP/OBS HIGH 50: CPT

## 2019-07-05 PROCEDURE — 99291 CRITICAL CARE FIRST HOUR: CPT

## 2019-07-05 PROCEDURE — 93010 ELECTROCARDIOGRAM REPORT: CPT | Mod: 76

## 2019-07-05 PROCEDURE — 31500 INSERT EMERGENCY AIRWAY: CPT

## 2019-07-05 PROCEDURE — 93970 EXTREMITY STUDY: CPT | Mod: 26

## 2019-07-05 PROCEDURE — 71045 X-RAY EXAM CHEST 1 VIEW: CPT | Mod: 26

## 2019-07-05 PROCEDURE — 99291 CRITICAL CARE FIRST HOUR: CPT | Mod: 25

## 2019-07-05 RX ORDER — ASPIRIN/CALCIUM CARB/MAGNESIUM 324 MG
81 TABLET ORAL DAILY
Refills: 0 | Status: DISCONTINUED | OUTPATIENT
Start: 2019-07-05 | End: 2019-07-10

## 2019-07-05 RX ORDER — ASPIRIN/CALCIUM CARB/MAGNESIUM 324 MG
300 TABLET ORAL DAILY
Refills: 0 | Status: DISCONTINUED | OUTPATIENT
Start: 2019-07-05 | End: 2019-07-05

## 2019-07-05 RX ORDER — ASPIRIN/CALCIUM CARB/MAGNESIUM 324 MG
325 TABLET ORAL ONCE
Refills: 0 | Status: COMPLETED | OUTPATIENT
Start: 2019-07-05 | End: 2019-07-05

## 2019-07-05 RX ORDER — CARVEDILOL PHOSPHATE 80 MG/1
6.25 CAPSULE, EXTENDED RELEASE ORAL EVERY 12 HOURS
Refills: 0 | Status: DISCONTINUED | OUTPATIENT
Start: 2019-07-05 | End: 2019-07-05

## 2019-07-05 RX ORDER — DEXTROSE 50 % IN WATER 50 %
25 SYRINGE (ML) INTRAVENOUS ONCE
Refills: 0 | Status: DISCONTINUED | OUTPATIENT
Start: 2019-07-05 | End: 2019-07-10

## 2019-07-05 RX ORDER — ENOXAPARIN SODIUM 100 MG/ML
40 INJECTION SUBCUTANEOUS DAILY
Refills: 0 | Status: DISCONTINUED | OUTPATIENT
Start: 2019-07-05 | End: 2019-07-10

## 2019-07-05 RX ORDER — SODIUM CHLORIDE 9 MG/ML
250 INJECTION, SOLUTION INTRAVENOUS ONCE
Refills: 0 | Status: COMPLETED | OUTPATIENT
Start: 2019-07-05 | End: 2019-07-05

## 2019-07-05 RX ORDER — ENOXAPARIN SODIUM 100 MG/ML
40 INJECTION SUBCUTANEOUS DAILY
Refills: 0 | Status: DISCONTINUED | OUTPATIENT
Start: 2019-07-05 | End: 2019-07-05

## 2019-07-05 RX ORDER — HEPARIN SODIUM 5000 [USP'U]/ML
INJECTION INTRAVENOUS; SUBCUTANEOUS
Qty: 25000 | Refills: 0 | Status: DISCONTINUED | OUTPATIENT
Start: 2019-07-05 | End: 2019-07-05

## 2019-07-05 RX ORDER — ALBUTEROL 90 UG/1
2 AEROSOL, METERED ORAL EVERY 4 HOURS
Refills: 0 | Status: DISCONTINUED | OUTPATIENT
Start: 2019-07-05 | End: 2019-07-06

## 2019-07-05 RX ORDER — GLUCAGON INJECTION, SOLUTION 0.5 MG/.1ML
1 INJECTION, SOLUTION SUBCUTANEOUS ONCE
Refills: 0 | Status: DISCONTINUED | OUTPATIENT
Start: 2019-07-05 | End: 2019-07-10

## 2019-07-05 RX ORDER — HEPARIN SODIUM 5000 [USP'U]/ML
4000 INJECTION INTRAVENOUS; SUBCUTANEOUS ONCE
Refills: 0 | Status: COMPLETED | OUTPATIENT
Start: 2019-07-05 | End: 2019-07-05

## 2019-07-05 RX ORDER — POTASSIUM CHLORIDE 20 MEQ
40 PACKET (EA) ORAL ONCE
Refills: 0 | Status: COMPLETED | OUTPATIENT
Start: 2019-07-05 | End: 2019-07-05

## 2019-07-05 RX ORDER — IPRATROPIUM/ALBUTEROL SULFATE 18-103MCG
3 AEROSOL WITH ADAPTER (GRAM) INHALATION EVERY 6 HOURS
Refills: 0 | Status: DISCONTINUED | OUTPATIENT
Start: 2019-07-05 | End: 2019-07-05

## 2019-07-05 RX ORDER — CHLORHEXIDINE GLUCONATE 213 G/1000ML
15 SOLUTION TOPICAL
Refills: 0 | Status: DISCONTINUED | OUTPATIENT
Start: 2019-07-05 | End: 2019-07-07

## 2019-07-05 RX ORDER — DEXTROSE 50 % IN WATER 50 %
12.5 SYRINGE (ML) INTRAVENOUS ONCE
Refills: 0 | Status: DISCONTINUED | OUTPATIENT
Start: 2019-07-05 | End: 2019-07-10

## 2019-07-05 RX ORDER — PANTOPRAZOLE SODIUM 20 MG/1
40 TABLET, DELAYED RELEASE ORAL DAILY
Refills: 0 | Status: DISCONTINUED | OUTPATIENT
Start: 2019-07-05 | End: 2019-07-05

## 2019-07-05 RX ORDER — IPRATROPIUM/ALBUTEROL SULFATE 18-103MCG
3 AEROSOL WITH ADAPTER (GRAM) INHALATION
Refills: 0 | Status: DISCONTINUED | OUTPATIENT
Start: 2019-07-05 | End: 2019-07-05

## 2019-07-05 RX ORDER — ALBUTEROL 90 UG/1
2.5 AEROSOL, METERED ORAL EVERY 6 HOURS
Refills: 0 | Status: DISCONTINUED | OUTPATIENT
Start: 2019-07-05 | End: 2019-07-05

## 2019-07-05 RX ORDER — ALBUTEROL 90 UG/1
2.5 AEROSOL, METERED ORAL
Refills: 0 | Status: DISCONTINUED | OUTPATIENT
Start: 2019-07-05 | End: 2019-07-05

## 2019-07-05 RX ORDER — METOPROLOL TARTRATE 50 MG
2.5 TABLET ORAL EVERY 6 HOURS
Refills: 0 | Status: DISCONTINUED | OUTPATIENT
Start: 2019-07-05 | End: 2019-07-05

## 2019-07-05 RX ORDER — INSULIN LISPRO 100/ML
VIAL (ML) SUBCUTANEOUS EVERY 6 HOURS
Refills: 0 | Status: DISCONTINUED | OUTPATIENT
Start: 2019-07-05 | End: 2019-07-07

## 2019-07-05 RX ORDER — HEPARIN SODIUM 5000 [USP'U]/ML
4000 INJECTION INTRAVENOUS; SUBCUTANEOUS EVERY 6 HOURS
Refills: 0 | Status: DISCONTINUED | OUTPATIENT
Start: 2019-07-05 | End: 2019-07-05

## 2019-07-05 RX ORDER — LANOLIN ALCOHOL/MO/W.PET/CERES
5 CREAM (GRAM) TOPICAL AT BEDTIME
Refills: 0 | Status: DISCONTINUED | OUTPATIENT
Start: 2019-07-05 | End: 2019-07-06

## 2019-07-05 RX ORDER — ALBUTEROL 90 UG/1
2 AEROSOL, METERED ORAL
Refills: 0 | Status: DISCONTINUED | OUTPATIENT
Start: 2019-07-05 | End: 2019-07-07

## 2019-07-05 RX ORDER — AZTREONAM 2 G
1000 VIAL (EA) INJECTION ONCE
Refills: 0 | Status: COMPLETED | OUTPATIENT
Start: 2019-07-05 | End: 2019-07-05

## 2019-07-05 RX ORDER — DEXMEDETOMIDINE HYDROCHLORIDE IN 0.9% SODIUM CHLORIDE 4 UG/ML
0.3 INJECTION INTRAVENOUS
Qty: 200 | Refills: 0 | Status: DISCONTINUED | OUTPATIENT
Start: 2019-07-05 | End: 2019-07-05

## 2019-07-05 RX ORDER — FENTANYL CITRATE 50 UG/ML
50 INJECTION INTRAVENOUS
Refills: 0 | Status: DISCONTINUED | OUTPATIENT
Start: 2019-07-05 | End: 2019-07-07

## 2019-07-05 RX ORDER — AZTREONAM 2 G
VIAL (EA) INJECTION
Refills: 0 | Status: DISCONTINUED | OUTPATIENT
Start: 2019-07-05 | End: 2019-07-05

## 2019-07-05 RX ORDER — ATORVASTATIN CALCIUM 80 MG/1
80 TABLET, FILM COATED ORAL AT BEDTIME
Refills: 0 | Status: DISCONTINUED | OUTPATIENT
Start: 2019-07-05 | End: 2019-07-10

## 2019-07-05 RX ORDER — METOPROLOL TARTRATE 50 MG
5 TABLET ORAL EVERY 6 HOURS
Refills: 0 | Status: DISCONTINUED | OUTPATIENT
Start: 2019-07-05 | End: 2019-07-05

## 2019-07-05 RX ORDER — NOREPINEPHRINE BITARTRATE/D5W 8 MG/250ML
0.05 PLASTIC BAG, INJECTION (ML) INTRAVENOUS
Qty: 8 | Refills: 0 | Status: DISCONTINUED | OUTPATIENT
Start: 2019-07-05 | End: 2019-07-05

## 2019-07-05 RX ORDER — AZTREONAM 2 G
1000 VIAL (EA) INJECTION EVERY 8 HOURS
Refills: 0 | Status: DISCONTINUED | OUTPATIENT
Start: 2019-07-05 | End: 2019-07-05

## 2019-07-05 RX ORDER — NOREPINEPHRINE BITARTRATE/D5W 8 MG/250ML
0.05 PLASTIC BAG, INJECTION (ML) INTRAVENOUS
Qty: 8 | Refills: 0 | Status: DISCONTINUED | OUTPATIENT
Start: 2019-07-05 | End: 2019-07-07

## 2019-07-05 RX ORDER — VANCOMYCIN HCL 1 G
1000 VIAL (EA) INTRAVENOUS EVERY 24 HOURS
Refills: 0 | Status: DISCONTINUED | OUTPATIENT
Start: 2019-07-05 | End: 2019-07-06

## 2019-07-05 RX ORDER — PROPOFOL 10 MG/ML
20 INJECTION, EMULSION INTRAVENOUS
Qty: 1000 | Refills: 0 | Status: DISCONTINUED | OUTPATIENT
Start: 2019-07-05 | End: 2019-07-07

## 2019-07-05 RX ORDER — SODIUM CHLORIDE 9 MG/ML
1000 INJECTION, SOLUTION INTRAVENOUS
Refills: 0 | Status: DISCONTINUED | OUTPATIENT
Start: 2019-07-05 | End: 2019-07-10

## 2019-07-05 RX ORDER — PROPOFOL 10 MG/ML
20 INJECTION, EMULSION INTRAVENOUS
Qty: 1000 | Refills: 0 | Status: DISCONTINUED | OUTPATIENT
Start: 2019-07-05 | End: 2019-07-05

## 2019-07-05 RX ORDER — ESCITALOPRAM OXALATE 10 MG/1
10 TABLET, FILM COATED ORAL DAILY
Refills: 0 | Status: DISCONTINUED | OUTPATIENT
Start: 2019-07-05 | End: 2019-07-10

## 2019-07-05 RX ORDER — DEXTROSE 50 % IN WATER 50 %
15 SYRINGE (ML) INTRAVENOUS ONCE
Refills: 0 | Status: DISCONTINUED | OUTPATIENT
Start: 2019-07-05 | End: 2019-07-10

## 2019-07-05 RX ADMIN — Medication 3: at 18:01

## 2019-07-05 RX ADMIN — HEPARIN SODIUM 600 UNIT(S)/HR: 5000 INJECTION INTRAVENOUS; SUBCUTANEOUS at 09:00

## 2019-07-05 RX ADMIN — HEPARIN SODIUM 800 UNIT(S)/HR: 5000 INJECTION INTRAVENOUS; SUBCUTANEOUS at 02:55

## 2019-07-05 RX ADMIN — Medication 6.38 MICROGRAM(S)/KG/MIN: at 13:07

## 2019-07-05 RX ADMIN — Medication 3 MILLILITER(S): at 03:47

## 2019-07-05 RX ADMIN — PANTOPRAZOLE SODIUM 40 MILLIGRAM(S): 20 TABLET, DELAYED RELEASE ORAL at 14:43

## 2019-07-05 RX ADMIN — Medication 5 MILLIGRAM(S): at 10:00

## 2019-07-05 RX ADMIN — Medication 5 MILLIGRAM(S): at 21:32

## 2019-07-05 RX ADMIN — ALBUTEROL 2 PUFF(S): 90 AEROSOL, METERED ORAL at 22:58

## 2019-07-05 RX ADMIN — Medication 5 MILLIGRAM(S): at 14:44

## 2019-07-05 RX ADMIN — Medication 3 MILLILITER(S): at 07:41

## 2019-07-05 RX ADMIN — DEXMEDETOMIDINE HYDROCHLORIDE IN 0.9% SODIUM CHLORIDE 5.1 MICROGRAM(S)/KG/HR: 4 INJECTION INTRAVENOUS at 10:19

## 2019-07-05 RX ADMIN — Medication 40 MILLIGRAM(S): at 21:32

## 2019-07-05 RX ADMIN — ATORVASTATIN CALCIUM 80 MILLIGRAM(S): 80 TABLET, FILM COATED ORAL at 02:58

## 2019-07-05 RX ADMIN — Medication 1: at 12:30

## 2019-07-05 RX ADMIN — HEPARIN SODIUM 4000 UNIT(S): 5000 INJECTION INTRAVENOUS; SUBCUTANEOUS at 02:53

## 2019-07-05 RX ADMIN — CHLORHEXIDINE GLUCONATE 15 MILLILITER(S): 213 SOLUTION TOPICAL at 18:00

## 2019-07-05 RX ADMIN — Medication 325 MILLIGRAM(S): at 02:46

## 2019-07-05 RX ADMIN — CARVEDILOL PHOSPHATE 6.25 MILLIGRAM(S): 80 CAPSULE, EXTENDED RELEASE ORAL at 03:15

## 2019-07-05 RX ADMIN — ENOXAPARIN SODIUM 40 MILLIGRAM(S): 100 INJECTION SUBCUTANEOUS at 16:54

## 2019-07-05 RX ADMIN — PROPOFOL 8.16 MICROGRAM(S)/KG/MIN: 10 INJECTION, EMULSION INTRAVENOUS at 13:40

## 2019-07-05 RX ADMIN — Medication 2: at 23:44

## 2019-07-05 RX ADMIN — SODIUM CHLORIDE 500 MILLILITER(S): 9 INJECTION, SOLUTION INTRAVENOUS at 02:35

## 2019-07-05 RX ADMIN — Medication 300 MILLIGRAM(S): at 11:52

## 2019-07-05 RX ADMIN — ALBUTEROL 2 PUFF(S): 90 AEROSOL, METERED ORAL at 18:00

## 2019-07-05 RX ADMIN — Medication 40 MILLIGRAM(S): at 02:39

## 2019-07-05 RX ADMIN — Medication 60 MILLIGRAM(S): at 18:00

## 2019-07-05 RX ADMIN — ALBUTEROL 2 PUFF(S): 90 AEROSOL, METERED ORAL at 20:28

## 2019-07-05 RX ADMIN — Medication 3 MILLILITER(S): at 13:15

## 2019-07-05 RX ADMIN — ATORVASTATIN CALCIUM 80 MILLIGRAM(S): 80 TABLET, FILM COATED ORAL at 21:32

## 2019-07-05 RX ADMIN — Medication 60 MILLIGRAM(S): at 09:16

## 2019-07-05 RX ADMIN — HEPARIN SODIUM 0 UNIT(S)/HR: 5000 INJECTION INTRAVENOUS; SUBCUTANEOUS at 08:00

## 2019-07-05 RX ADMIN — DEXMEDETOMIDINE HYDROCHLORIDE IN 0.9% SODIUM CHLORIDE 5.1 MICROGRAM(S)/KG/HR: 4 INJECTION INTRAVENOUS at 12:30

## 2019-07-05 RX ADMIN — Medication 20 MILLIGRAM(S): at 04:01

## 2019-07-05 RX ADMIN — Medication 50 MILLIGRAM(S): at 06:31

## 2019-07-05 RX ADMIN — ESCITALOPRAM OXALATE 10 MILLIGRAM(S): 10 TABLET, FILM COATED ORAL at 16:54

## 2019-07-05 RX ADMIN — Medication 250 MILLIGRAM(S): at 08:11

## 2019-07-05 RX ADMIN — PROPOFOL 8.16 MICROGRAM(S)/KG/MIN: 10 INJECTION, EMULSION INTRAVENOUS at 20:38

## 2019-07-05 NOTE — PROVIDER CONTACT NOTE (EICU) - RECOMMENDATIONS
resp status monitoring  bronchodilators  c/w BIPAP  NPO  f/up cardiac enzymes  ECHO  ABX for possible URI/PNA resp status monitoring  bronchodilators  c/w BIPAP  NPO  f/up cardiac enzymes  ECHO  ABX for possible URI/PNA  Plan discussed in detail with  bedside PA resp status monitoring  bronchodilators  c/w BIPAP  NPO  f/up cardiac enzymes  ECHO  ABX for possible URI/PNA  Plan discussed in detail with  bedside GLORIA Smith

## 2019-07-05 NOTE — H&P ADULT - PROBLEM SELECTOR PLAN 3
-continue home med oral KCl -continue home med oral KCl  -K 3.9, replete PRN - diastolic heart failure, unknown last echo   - pt hypovolemic - holding lasix for now   - continue with coreg

## 2019-07-05 NOTE — PROGRESS NOTE ADULT - ASSESSMENT
86 yo F with history of asthma with chronic asthmatic bronchitis, anxiety, MDD, hypokalemia, LE edema, from [facility] for hypercapnic respiratory failure likely from asthma exacerbation, mental status and ABG improved with bipap, nebs and steroids.  has cough but CXR clear.    - Continue bipap, most recent ABG 7.41/36/56/23/60%.  lower IPAP from 18 to 16, increase fi02 from 40-60%  - Combivent q6h standing q2h PRN  - She was on prednisone 40mg daily from 6/26 to 7/1 for bronchitis at [facility].  Start Methylprednisolone 40 IV q12.    - WBC 20 with elevated PCT, pan culture, start levaquin. RVP panel  - NSTEMI- Lopez ..83 from .06 with ST depressions in II III AVF, start aspirin, metoprolol, heparin gtt, cardiology consult.  Keep K+ >4 Mg >2. continue to trend CE  - Echocardiogram  -IVC less then 1cm and hyperdynamic LV.  Hold lasix, give 250 cc bolus 84 yo F with history of asthma with chronic asthmatic bronchitis, diastolic HF, anxiety, MDD, hypokalemia, LE edema, from [facility] brought in by EMS for hypercapnic respiratory failure transfered to ICU for continued management.   +rhinovirus/enterovirus. Respiratory failure likely secondary to rhinovirus/enterovirus that triggered asthma exacerbation.    Despite duonebs, solumedrol, and BIPAP, pt but continued to look anxious, grasp onto rails for accesory muscle use, and pull off the BIPIP. Visibly in acute distress and was thus intubated and OGT placed.   NSTEMI likely to increased demand of asthma exacerbation. CXR no acute findings. Doppler US lower extremities negative. Low suspicion for PE given presentation with diffuse whezing that will not go away. Other possibility low on differential includes HCAP.      neuro AMS improved A & O x3, no pain medicine     CV  NSTEMI ST depressions II III AVF, troponins uptrending .06--> .83--> 1.87  repeat EKG and troponins   metropolol IV push 5 q6, rectal aspirin 300  cardiology consult, appreciate reccs     pulm   respiratory failure, ABG 7/5 14:41 7.36/45/24/100                          7/4 22:03 7.41/36/23/90                        7/4 21:02 7.16/75/20/99   combivent q6, albuterol PRN q2, nebs PRN, methyprednisolone 60 q8   continue ventilator settings at AC 14/300/40%/PEEP 5      GI NPO, OGT in place     renal no acute issues     ID afebrile, leukocytosis stabilizgin (wbcs 20-->11), cont. levoquin/vancoycin for pnmonia coverage, +rhinovirusenterovirus, further testing for: legionella, strep pneumo, sputum cultures, MRSA nare swab PCR (may d/c vancomycin if -)     heme no acute issues     endo: insulin coverage scale, elevated  2ndry to steroids 84 yo F with history of asthma with chronic asthmatic bronchitis, diastolic HF, anxiety, MDD, hypokalemia, LE edema, from [facility] brought in by EMS for hypercapnic respiratory failure transfered to ICU for continued management.   +rhinovirus/enterovirus. Respiratory failure secondary to asthma exacerbation,  triggered by rhinovirus/enterovirus infection.   Despite duonebs, solumedrol, and BIPAP, pt but continued to look anxious, grasp onto rails for accessory muscle use, and pull off the BIPAP. Visibly in acute distress, given use of accessory muscles at high risk for fatigue and decompensation, thus intubated.  NSTEMI likely to increased demand of asthma exacerbation. CXR no acute findings. Doppler US lower extremities negative. Low suspicion for PE given presentation with diffuse wheezing, patient w/ hypercapnic respiratory failure: no signs of hypoxia. Other possibility low on differential includes HCAP.      neuro AMS improved A & O x3, no pain medicine     CV  NSTEMI ST depressions II III AVF, troponins uptrending .06--> .83--> 1.87  repeat EKG and troponins    rectal aspirin 300: given hypotension on propofol, and borderline bradycardia BB on hold, may resume after patient becomes more stable  cardiology consult, appreciate reccs     pulm   respiratory failure, ABG 7/5 14:41 7.36/45/24/100                          7/4 22:03 7.41/36/23/90                        7/4 21:02 7.16/75/20/99   Intubated: combivent q6, albuterol PRN q2, nebs PRN, methyprednisolone 60 q8   continue ventilator settings at AC 14/300/40%/PEEP 5      GI NPO, OGT in place     renal no acute issues     ID afebrile, leukocytosis stabilizing (wbcs 20-->11), cont. levoquin/vancoycin for pnmonia coverage, +rhinovirusenterovirus, further testing for: legionella, strep pneumo, sputum cultures, MRSA nare swab PCR (may d/c vancomycin if -)     heme no acute issues     endo: insulin coverage scale, elevated  2/2 steroids

## 2019-07-05 NOTE — PROGRESS NOTE ADULT - PROBLEM SELECTOR PLAN 1
-intubated in ICU  - acute respiratory failure likely multifactorial in the setting of possible underlying COPD/asthma  -patient meets SIRS criteria with WBC >12 and RR >20 with suspected source of infection in lungs  -continue with this antibiotic regimen per ICU recs  -On pressors

## 2019-07-05 NOTE — H&P ADULT - NSHPSOCIALHISTORY_GEN_ALL_CORE
Lives at [facility] for about a year.  Patient states she has a roommate and states she performs ADLs by herself.  Admits to tobacco use 40 years ago (age [age]) smoking about a pack a week.  Patient states she uses a walker most of the time but can ambulate without one.

## 2019-07-05 NOTE — H&P ADULT - NSHPREVIEWOFSYSTEMS_GEN_ALL_CORE
Constitutional: denies fever, chills, sweating  HEENT: denies headache, dizziness, or lightheadedness  Respiratory: (+) SOB, cough, and wheezing  Cardiovascular: denies CP, palpitations  Gastrointestinal: denies nausea, vomiting, diarrhea, constipation, abdominal pain, or bloody stools  Genitourinary: denies painful urination, increased frequency, urgency, or bloody urine  Skin/Breast: denies rashes or itching  Musculoskeletal: denies muscle aches, joint swelling, or muscle weakness  Neurologic: denies loss of sensation, numbness, or tingling  Psychiatric:  (+) anxiety, denies depression  ROS negative except as noted above

## 2019-07-05 NOTE — PROGRESS NOTE ADULT - SUBJECTIVE AND OBJECTIVE BOX
at 330am patient developed acute onset difficulty breathing, had bilateral wheezing on exam L>R, BP 140s/80s  sp02 96%, IPAP was increased to 19, stat duonebs given, methylprednisolone increased from 40iv q12 to 60mg IV q8, additional methylprednisolone 20mg ordered stat.  She improved with these treatments.  The episode happened within vicinity of receiving Coreg, suspect she may have had BB induced bronchospasm.  Will hold for now. at 330am patient developed acute onset difficulty breathing, had bilateral wheezing on exam L>R, BP 140s/80s  sp02 96%, IPAP was increased to 19, stat duonebs given, methylprednisolone increased from 40iv q12 to 60mg IV q8, additional methylprednisolone 20mg ordered stat.  She improved with these treatments.  The episode happened within vicinity of receiving Coreg,  may have had BB induced bronchospasm.  Will hold for now.

## 2019-07-05 NOTE — H&P ADULT - PROBLEM SELECTOR PLAN 5
IMPROVE VTE Individual Risk Assessment  RISK                                                                Points  [ x ] Previous VTE                                                  3  [  ] Thrombophilia                                               2  [  ] Lower limb paralysis                                      2        (unable to hold up >15 seconds)    [  ] Current Cancer                                              2         (within 6 months)  [  ] Immobilization > 24 hrs                                1    [  ] ICU/CCU stay > 24 hours                              1    [ x ] Age > 60                                                      1    IMPROVE VTE Score _4______    -start lovenox 40 mg subQ qd for DVT ppx

## 2019-07-05 NOTE — H&P ADULT - PROBLEM SELECTOR PLAN 1
-Continue with BIPAP -Admit to ICU  -patient's acute respiratory failure likely multifactorial in the setting of possible underlying COPD/asthma  -patient meets SIRS criteria with WBC >12 and RR >20 with suspected source of infection in lungs  -s/p 1 dose of 1x dose of levaquin in ICU as patient is allergic to penicillin. Will -continue with this antibiotic regimen per ICU recs  -procal noted at 0.47, with lactate wnl at 0.8  -intial abg showing respiratory acidosis with pH 7.16 pCO2 75 patient with AMS likely 2/2 CO2 narcosis  -subsequently placed on BIPAP repeat ABG showing ABG pH 7.41 and CO2 36  -continue with BIPAP wean as tolerated  -continuous pulse ox monitoring

## 2019-07-05 NOTE — CONSULT NOTE ADULT - ASSESSMENT
86 y/o F with PMHx of Diastolic HF, bronchitis, and chronic LE edema presenting from [facility] with shortness of breath and cough, admitted for acute hypoxic respiratory failure with hypercapnia, now seen by cardio for r/o NSTEMI      - EKG exhibits ST depressions in leads II, III, and AVF.   - Troponin elevated 0.066 at 9pm, 0.835 at 1am, 1.890 at 7am  - Patient started on ASA and Heparin ggt   - Patient unable to confirm any of past medical problems, including cardiac pathology  - Patient has questionable history of HFpEF, holding home lasix 2/2 hypovolemia confirmed with US of IVC overnight. Continue to hold lasix.  - Echo to be performed this morning, f/u results  - BP well controlled, continue home BP meds, monitor routine hemodynamics  - monitor and replete lytes, keep K>4, Mg>2  - All other workup per primary team  - Will follow 84 y/o F with PMHx of Diastolic HF, bronchitis, and chronic LE edema presenting from [facility] with shortness of breath and cough, admitted for acute hypoxic respiratory failure with hypercapnia, now seen by cardio for r/o NSTEMI      - EKG exhibits ST depressions in leads II, III, and AVF on previous studies  - Troponin elevated 0.066 at 9pm, 0.835 at 1am, 1.890 at 7am  - Likely demand ischemia in the setting of acute hypercapnic respiratory failure  - Stop Heparin ggt, c/w ASA   - Patient unable to confirm any of past medical problems, including cardiac pathology  - Patient has questionable history of HFpEF, holding home lasix 2/2 hypovolemia confirmed with US of IVC overnight. Continue to hold lasix.  - Echo to be performed this morning, f/u results  - BP well controlled, continue home BP meds, monitor routine hemodynamics  - monitor and replete lytes, keep K>4, Mg>2  - All other workup per primary team  - Will follow 86 y/o F with PMHx of Diastolic HF, bronchitis, and chronic LE edema presenting from [facility] with shortness of breath and cough, admitted for acute hypoxic respiratory failure with hypercapnia, now seen by cardio for r/o NSTEMI      - EKG exhibits ST depressions in leads II, III, and AVF on previous studies  - Troponin elevated 0.066 at 9pm, 0.835 at 1am, 1.890 at 7am  - Likely demand ischemia in the setting of acute hypercapnic respiratory failure  - Stop Heparin ggt, c/w ASA and statin  - Patient unable to confirm any of past medical problems, including cardiac pathology  - Patient has questionable history of HFpEF, holding home lasix 2/2 hypovolemia confirmed with US of IVC overnight. Continue to hold lasix.  - Echo to be performed this morning, f/u results  - BP well controlled, continue home BP meds, monitor routine hemodynamics  - monitor and replete lytes, keep K>4, Mg>2  - All other workup per primary team  - Will follow 86 y/o F with PMHx of Diastolic HF, bronchitis, and chronic LE edema presenting from [facility] with shortness of breath and cough, admitted for acute hypoxic respiratory failure with hypercapnia, now seen by cardio for r/o NSTEMI      - EKG exhibits ST depressions in leads II, III, and AVF on previous studies  - Troponin elevated 0.066 at 9pm, 0.835 at 1am, 1.890 at 7am  - Continue to trend cardiac enzymes   - Likely demand ischemia in the setting of acute hypercapnic respiratory failure  - Stop Heparin ggt, c/w ASA and statin  - Patient unable to confirm any of past medical problems, including cardiac pathology  - Patient has questionable history of HFpEF, holding home lasix 2/2 hypovolemia confirmed with US of IVC overnight. Continue to hold lasix.  - Echo to be performed this morning, f/u results  - BP well controlled, continue home BP meds, monitor routine hemodynamics  - monitor and replete lytes, keep K>4, Mg>2  - All other workup per primary team  - Will follow 86 y/o F with PMHx of Diastolic HF, bronchitis, and chronic LE edema presenting from [facility] with shortness of breath and cough, admitted for acute hypoxic respiratory failure with hypercapnia, now seen by cardio for r/o NSTEMI      - EKG exhibits ST depressions in leads II, III, and AVF on previous studies  - Troponin elevated 0.066 at 9pm, 0.835 at 1am, 1.890 at 7am  - Continue to trend cardiac enzymes   - Likely demand ischemia in the setting of acute hypercapnic respiratory failure  - Stop Heparin ggt, c/w ASA and statin  - Patient unable to confirm any of past medical problems, including cardiac pathology  - Patient has questionable history of HFpEF, holding home lasix 2/2 hypovolemia confirmed with US of IVC overnight. Continue to hold lasix.  - Echo to be performed this morning, f/u results  - BP well controlled, continue home BP meds, monitor routine hemodynamics  - Continue BIPAP for respiratory support.  She is still very labored, and is likely to be intubated by the end of th eday  - monitor and replete lytes, keep K>4, Mg>2  - All other workup per primary team  - Will follow

## 2019-07-05 NOTE — H&P ADULT - ASSESSMENT
84 y/o F with PMHx of Diastolic HF, bronchitis, and chronic LE edema presenting from [facility] with shortness of breath and cough, admitted for acute hypoxic respiratory failure with hypercapnia. 86 y/o F with PMHx of Diastolic HF, bronchitis, and chronic LE edema presenting from [facility] with shortness of breath and cough, admitted for acute hypoxic respiratory failure with hypercapnia.

## 2019-07-05 NOTE — DIETITIAN INITIAL EVALUATION ADULT. - PROBLEM SELECTOR PLAN 3
- diastolic heart failure, unknown last echo   - pt hypovolemic - holding lasix for now   - continue with coreg

## 2019-07-05 NOTE — H&P ADULT - NSHPPHYSICALEXAM_GEN_ALL_CORE
Physical Exam:  General: Elderly, laying in hospital bed with BIBPAP, NAD  HEENT: NC/AT, PERRLA, EOMI B/L, moist mucous membranes   Neck: Supple, nontender, no masses  CV: RRR, +S1/S2, no murmurs, rubs or gallops  Respiratory: Non-labored.  Diffuse inspiratory>expiratory wheezes.  Abdominal: Soft, NT, ND +BSx4  Extremities: +2 b/l LE pitting edema, + peripheral pulses  Neurology: AAOx3, nonfocal, CN II-XII grossly intact, sensation intact

## 2019-07-05 NOTE — DIETITIAN INITIAL EVALUATION ADULT. - PROBLEM SELECTOR PLAN 1
-Admit to ICU  -patient's acute respiratory failure likely multifactorial in the setting of possible underlying COPD/asthma  -patient meets SIRS criteria with WBC >12 and RR >20 with suspected source of infection in lungs  -s/p 1 dose of 1x dose of levaquin in ICU as patient is allergic to penicillin. Will -continue with this antibiotic regimen per ICU recs  -procal noted at 0.47, with lactate wnl at 0.8  -intial abg showing respiratory acidosis with pH 7.16 pCO2 75 patient with AMS likely 2/2 CO2 narcosis  -subsequently placed on BIPAP repeat ABG showing ABG pH 7.41 and CO2 36  -continue with BIPAP wean as tolerated  -continuous pulse ox monitoring

## 2019-07-05 NOTE — DIETITIAN INITIAL EVALUATION ADULT. - ENERGY NEEDS
BMI 25. Possible st 1 pressure injury-sacrum, will monitor. Glu elevated with solumedrol Rx, rec Hba1c level, consider humalog correction/accuchecks. NFPE deferred due to current condition/BIPAP.

## 2019-07-05 NOTE — H&P ADULT - HISTORY OF PRESENT ILLNESS
84 y/o F with PMHx of Diastolic HF, bronchitis, and chronic LE edema presented to the [place] [facility] on 07/04/19 PATRICIA from [facility] with shortness of breath and cough.  Patient was unable to remember getting short of breath or getting to the hospital and admits to poor memory.  Per EMS patient received Lasix and Solumedrol.  Patient states she uses an nebulizer for her asthma when needed.  Denies sick contacts.  Denies Chest pain.    In the ED, VS: T 98.0, /85, HR 12, RR30, O2 97 on O2.  CBC with WBC 20.  ABG 7.16/75/172/20 which improved with BIPAP repeat ABG 7.41/36/56/23. 86 y/o F with PMHx of Diastolic HF, bronchitis, and chronic LE edema presented to the [place] [facility] on 07/04/19 PATRICIA from [facility] with shortness of breath and cough.  Patient was unable to remember getting short of breath or getting to the hospital and admits to poor memory.  Per EMS patient received Lasix and Solumedrol.  Patient states she uses an nebulizer for her asthma when needed.  Denies sick contacts.  Denies Chest pain. Denies new exposure to animal.    In the ED, VS: T 98.0, /85, HR 12, RR30, O2 97 on O2.  CBC with WBC 20.  ABG 7.16/75/172/20 which improved with BIPAP repeat ABG 7.41/36/56/23. 84 y/o F with PMHx of Diastolic HF, bronchitis, and chronic LE edema presented to the [place] [facility] on 07/04/19 [facility]RIC[facility] with shortness of breath and cough.  Patient was unable to remember getting short of breath or getting to the hospital and admits to poor memory.  Per EMS patient received Lasix and Solumedrol.  Patient states she uses an nebulizer for her asthma when needed.  Denies sick contacts.  Denies Chest pain. Denies new exposure to animal.    In the ED, VS: T 98.0, /85, HR 12, RR30, O2 97 on O2.  CBC with WBC 20.  ABG 7.16/75/172/20 which improved with BIPAP repeat ABG 7.41/36/56/23. Trop 0.066. 84 y/o F with PMHx of Diastolic HF, bronchitis, and chronic LE edema presented to the [place] [facility] on 07/04/19 BIBEMS from [facility] with shortness of breath and cough.  Limited history as patient was unable to remember getting short of breath or getting to the hospital and admits to poor memory. pt states prior to this episode she was in her normal state of health  Per EMS patient received Lasix and Solumedrol.  Patient states she uses an nebulizer for her asthma when needed.  Denies sick contacts.  Denies Chest pain. Denies new exposure to animal.    In the ED, VS: T 98.0, /85, HR 12, RR30, O2 97 on O2.  CBC with WBC 20.  ABG 7.16/75/172/20 which improved with BIPAP repeat ABG 7.41/36/56/23. Trop 0.066.

## 2019-07-05 NOTE — PROGRESS NOTE ADULT - SUBJECTIVE AND OBJECTIVE BOX
INTERVAL HPI/OVERNIGHT EVENTS: Patient seen and examined at bedside. Patient just intubated in ICU for worsening dyspnea.     MEDICATIONS  (STANDING):  ALBUTerol    90 MICROgram(s) HFA Inhaler 2 Puff(s) Inhalation every 4 hours  aspirin  chewable 81 milliGRAM(s) Oral daily  atorvastatin 80 milliGRAM(s) Oral at bedtime  chlorhexidine 0.12% Liquid 15 milliLiter(s) Oral Mucosa two times a day  dextrose 5%. 1000 milliLiter(s) (50 mL/Hr) IV Continuous <Continuous>  dextrose 50% Injectable 12.5 Gram(s) IV Push once  dextrose 50% Injectable 25 Gram(s) IV Push once  dextrose 50% Injectable 25 Gram(s) IV Push once  enoxaparin Injectable 40 milliGRAM(s) SubCutaneous daily  escitalopram 10 milliGRAM(s) Oral daily  guaiFENesin  milliGRAM(s) Oral every 12 hours  insulin lispro (HumaLOG) corrective regimen sliding scale   SubCutaneous every 6 hours  levoFLOXacin IVPB      melatonin 5 milliGRAM(s) Oral at bedtime  methylPREDNISolone sodium succinate Injectable 60 milliGRAM(s) IV Push every 8 hours  norepinephrine Infusion 0.05 MICROgram(s)/kG/Min (6.375 mL/Hr) IV Continuous <Continuous>  propofol Infusion 20 MICROgram(s)/kG/Min (8.16 mL/Hr) IV Continuous <Continuous>  vancomycin  IVPB 1000 milliGRAM(s) IV Intermittent every 24 hours    MEDICATIONS  (PRN):  ALBUTerol    90 MICROgram(s) HFA Inhaler 2 Puff(s) Inhalation every 2 hours PRN Shortness of Breath and/or Wheezing  dextrose 40% Gel 15 Gram(s) Oral once PRN Blood Glucose LESS THAN 70 milliGRAM(s)/deciliter  glucagon  Injectable 1 milliGRAM(s) IntraMuscular once PRN Glucose LESS THAN 70 milligrams/deciliter      Allergies    penicillin (Unknown)    Intolerances      ROS: Unable to obtain, patient intubated    Vital Signs Last 24 Hrs  T(C): 34.6 (05 Jul 2019 15:40), Max: 36.7 (04 Jul 2019 20:43)  T(F): 94.3 (05 Jul 2019 15:40), Max: 98.1 (05 Jul 2019 00:05)  HR: 93 (05 Jul 2019 16:34) (51 - 117)  BP: 122/60 (05 Jul 2019 14:30) (64/44 - 189/105)  BP(mean): 84 (05 Jul 2019 14:30) (50 - 966)  RR: 31 (05 Jul 2019 14:30) (14 - 45)  SpO2: 100% (05 Jul 2019 16:34) (91% - 100%)    General: sedated  Neurology: sedated,   Respiratory: coarse breath sounds  CV: RRR, S1S2  Abdominal: Soft, NT, ND +BS  Extremities: No edema, + peripheral pulses      LABS:                        14.8   11.69 )-----------( 135      ( 05 Jul 2019 06:29 )             44.2     07-05    139  |  99  |  25<H>  ----------------------------<  234<H>  3.6   |  32<H>  |  0.97    Ca    8.5      05 Jul 2019 06:29  Phos  2.5     07-05  Mg     2.0     07-05    TPro  6.6  /  Alb  3.5  /  TBili  0.7  /  DBili  x   /  AST  16  /  ALT  20  /  AlkPhos  121<H>  07-04    PT/INR - ( 05 Jul 2019 06:29 )   PT: 14.5 sec;   INR: 1.27 ratio         PTT - ( 05 Jul 2019 06:29 )  PTT:139.5 sec      RADIOLOGY & ADDITIONAL TESTS:

## 2019-07-05 NOTE — H&P ADULT - PROBLEM SELECTOR PLAN 4
DVT prophylaxis - Lovenox subQ  GI - Pepcid IMPROVE VTE Individual Risk Assessment  RISK                                                                Points  [ x ] Previous VTE                                                  3  [  ] Thrombophilia                                               2  [  ] Lower limb paralysis                                      2        (unable to hold up >15 seconds)    [  ] Current Cancer                                              2         (within 6 months)  [  ] Immobilization > 24 hrs                                1    [  ] ICU/CCU stay > 24 hours                              1    [ x ] Age > 60                                                      1    IMPROVE VTE Score _4______    -start lovenox 40 mg subQ qd for DVT ppx -continue home med oral KCl  -K 3.9, replete PRN

## 2019-07-05 NOTE — DIETITIAN INITIAL EVALUATION ADULT. - DIET TYPE
consistent CHO if glu remains elevated:when respiratory status improves/DASH/TLC (sodium and cholesterol restricted diet)

## 2019-07-05 NOTE — CONSULT NOTE ADULT - SUBJECTIVE AND OBJECTIVE BOX
[facility] - Modesto Moreno, Marco A Roy Pannella, Patel, Savella  Office Number: [phone]    Initial Consult Note    CHIEF COMPLAINT: Patient is a 85y old  Female who presents with a chief complaint of SOB (05 Jul 2019 03:47)      HPI:  84 y/o F with PMHx of Diastolic HF, bronchitis, and chronic LE edema presented to the [place] [facility] on 07/04/19 BIBEMS from [facility] with shortness of breath and cough.  Limited history as patient was unable to remember getting short of breath or getting to the hospital and admits to poor memory. pt states prior to this episode she was in her normal state of health  Per EMS patient received Lasix and Solumedrol.  Patient states she uses an nebulizer for her asthma when needed.  Denies sick contacts.  Denies Chest pain. Denies new exposure to animal.    In the ED, VS: T 98.0, /85, HR 12, RR30, O2 97 on O2.  CBC with WBC 20.  ABG 7.16/75/172/20 which improved with BIPAP repeat ABG 7.41/36/56/23. Trop 0.066. (05 Jul 2019 01:03)      PAST MEDICAL & SURGICAL HISTORY:  Asthma  Pain  NSTEMI (non-ST elevated myocardial infarction)  Chronic embolism and thrombosis of unspecified deep veins of right lower extremity  Anxiety disorder  Major depressive disorder  Bronchitis  Cognitive communication deficit  Muscle weakness (generalized)  Insomnia  Hypokalemia  Edema  Essential (primary) hypertension  Unspecified diastolic (congestive) heart failure  No significant past surgical history      SOCIAL HISTORY:  No tobacco, ethanol, or drug abuse.    FAMILY HISTORY:  No pertinent family history in first degree relatives    No family history of acute MI or sudden cardiac death.    MEDICATIONS  (STANDING):  ALBUTerol/ipratropium for Nebulization 3 milliLiter(s) Nebulizer every 6 hours  atorvastatin 80 milliGRAM(s) Oral at bedtime  heparin  Infusion.  Unit(s)/Hr (8 mL/Hr) IV Continuous <Continuous>  levoFLOXacin IVPB      melatonin 5 milliGRAM(s) Oral at bedtime  methylPREDNISolone sodium succinate Injectable 60 milliGRAM(s) IV Push every 8 hours  potassium chloride   Powder 40 milliEquivalent(s) Oral once  vancomycin  IVPB 1000 milliGRAM(s) IV Intermittent every 24 hours    MEDICATIONS  (PRN):  ALBUTerol/ipratropium for Nebulization 3 milliLiter(s) Nebulizer every 2 hours PRN Bronchospasm  heparin  Injectable 4000 Unit(s) IV Push every 6 hours PRN For aPTT less than 40      Allergies    penicillin (Unknown)    Intolerances        REVIEW OF SYSTEMS:    CONSTITUTIONAL: No weakness, fevers or chills  EYES/ENT: No visual changes;  No vertigo or throat pain   NECK: No pain or stiffness  RESPIRATORY: No cough, wheezing, hemoptysis; No shortness of breath  CARDIOVASCULAR: No chest pain or palpitations  GASTROINTESTINAL: No abdominal pain. No nausea, vomiting, or hematemesis; No diarrhea or constipation. No melena or hematochezia.  GENITOURINARY: No dysuria, frequency or hematuria  NEUROLOGICAL: No numbness or weakness  SKIN: No itching or rash  All other review of systems is negative unless indicated above    VITAL SIGNS:   Vital Signs Last 24 Hrs  T(C): 36.7 (05 Jul 2019 03:24), Max: 36.7 (04 Jul 2019 20:43)  T(F): 98 (05 Jul 2019 03:24), Max: 98.1 (05 Jul 2019 00:05)  HR: 104 (05 Jul 2019 06:02) (95 - 117)  BP: 121/72 (05 Jul 2019 06:02) (115/66 - 189/105)  BP(mean): 91 (05 Jul 2019 06:02) (83 - 110)  RR: 34 (05 Jul 2019 06:02) (22 - 41)  SpO2: 97% (05 Jul 2019 06:02) (92% - 98%)    I&O's Summary    04 Jul 2019 07:01  -  05 Jul 2019 07:00  --------------------------------------------------------  IN: 474 mL / OUT: 0 mL / NET: 474 mL        On Exam:    Constitutional: NAD, alert and oriented x 3  Lungs:  On Bipap, faint diffuse wheezing, diminished breath sounds  Cardiovascular: RRR.  S1 and S2 positive.  No murmurs, rubs, gallops or clicks  Gastrointestinal: Bowel Sounds present, soft, nontender.   Lymph: +3 edema b/l lower extremities. No cervical lymphadenopathy.  Neurological: Alert, no focal deficits, poor memory  Skin: No rashes or ulcers   Psych:  Mood & affect appropriate.    LABS: All Labs Reviewed:                        14.8   11.69 )-----------( 135      ( 05 Jul 2019 06:29 )             44.2                         14.9   20.51 )-----------( 193      ( 04 Jul 2019 21:04 )             45.8     05 Jul 2019 06:29    139    |  99     |  25     ----------------------------<  234    3.6     |  32     |  0.97   04 Jul 2019 21:04    138    |  104    |  26     ----------------------------<  250    3.9     |  27     |  0.77     Ca    8.5        05 Jul 2019 06:29  Ca    8.4        04 Jul 2019 21:04  Phos  2.5       05 Jul 2019 06:29  Mg     2.0       05 Jul 2019 06:29  Mg     2.1       04 Jul 2019 21:04    TPro  6.6    /  Alb  3.5    /  TBili  0.7    /  DBili  x      /  AST  16     /  ALT  20     /  AlkPhos  121    04 Jul 2019 21:04    PT/INR - ( 05 Jul 2019 06:29 )   PT: 14.5 sec;   INR: 1.27 ratio         PTT - ( 05 Jul 2019 06:29 )  PTT:139.5 sec  CARDIAC MARKERS ( 05 Jul 2019 06:29 )  1.890 ng/mL / x     / 94 U/L / x     / 7.4 ng/mL  CARDIAC MARKERS ( 05 Jul 2019 00:52 )  .835 ng/mL / x     / 62 U/L / x     / 6.0 ng/mL  CARDIAC MARKERS ( 04 Jul 2019 21:04 )  .066 ng/mL / x     / x     / x     / x          Blood Culture:   07-04 @ 21:04  Pro Bnp [id number]        RADIOLOGY:    EKG: [facility] - Modesto Moreno Grossman, [place], Pannella, Patel, Savella  Office Number: [phone]    Initial Consult Note    CHIEF COMPLAINT: Patient is a 85y old  Female who presents with a chief complaint of SOB (05 Jul 2019 03:47)      HPI:  86 y/o F with PMHx of Diastolic HF, bronchitis, and chronic LE edema presented to the [place] [facility] on 07/04/19 BIBEMS from [facility] with shortness of breath and cough.  Limited history as patient was unable to remember getting short of breath or getting to the hospital and admits to poor memory. pt states prior to this episode she was in her normal state of health  Per EMS patient received Lasix and Solumedrol.  Patient states she uses an nebulizer for her asthma when needed.  Denies sick contacts.  Denies Chest pain. Denies new exposure to animal.    In the ED, VS: T 98.0, /85, HR 12, RR30, O2 97 on O2.  CBC with WBC 20.  ABG 7.16/75/172/20 which improved with BIPAP repeat ABG 7.41/36/56/23. Trop 0.066. (05 Jul 2019 01:03)      PAST MEDICAL & SURGICAL HISTORY:  Asthma  Pain  NSTEMI (non-ST elevated myocardial infarction)  Chronic embolism and thrombosis of unspecified deep veins of right lower extremity  Anxiety disorder  Major depressive disorder  Bronchitis  Cognitive communication deficit  Muscle weakness (generalized)  Insomnia  Hypokalemia  Edema  Essential (primary) hypertension  Unspecified diastolic (congestive) heart failure  No significant past surgical history      Family history: Denies all family history of MI, CAD, CHF, or arrythmia, No pertinent family history in first degree relatives, No family history of sudden cardiac death.    Social History: Endorses distant smoking history, denies ethanol, or drug abuse.         MEDICATIONS  (STANDING):  ALBUTerol/ipratropium for Nebulization 3 milliLiter(s) Nebulizer every 6 hours  atorvastatin 80 milliGRAM(s) Oral at bedtime  heparin  Infusion.  Unit(s)/Hr (8 mL/Hr) IV Continuous <Continuous>  levoFLOXacin IVPB      melatonin 5 milliGRAM(s) Oral at bedtime  methylPREDNISolone sodium succinate Injectable 60 milliGRAM(s) IV Push every 8 hours  potassium chloride   Powder 40 milliEquivalent(s) Oral once  vancomycin  IVPB 1000 milliGRAM(s) IV Intermittent every 24 hours    MEDICATIONS  (PRN):  ALBUTerol/ipratropium for Nebulization 3 milliLiter(s) Nebulizer every 2 hours PRN Bronchospasm  heparin  Injectable 4000 Unit(s) IV Push every 6 hours PRN For aPTT less than 40      Allergies    penicillin (Unknown)    Intolerances        REVIEW OF SYSTEMS:    CONSTITUTIONAL: No weakness, fevers or chills  EYES/ENT: No visual changes;  No vertigo or throat pain   NECK: No pain or stiffness  RESPIRATORY: No cough, wheezing, hemoptysis; No shortness of breath  CARDIOVASCULAR: No chest pain or palpitations  GASTROINTESTINAL: No abdominal pain. No nausea, vomiting, or hematemesis; No diarrhea or constipation. No melena or hematochezia.  GENITOURINARY: No dysuria, frequency or hematuria  NEUROLOGICAL: No numbness or weakness  SKIN: No itching or rash  All other review of systems is negative unless indicated above    VITAL SIGNS:   Vital Signs Last 24 Hrs  T(C): 36.7 (05 Jul 2019 03:24), Max: 36.7 (04 Jul 2019 20:43)  T(F): 98 (05 Jul 2019 03:24), Max: 98.1 (05 Jul 2019 00:05)  HR: 104 (05 Jul 2019 06:02) (95 - 117)  BP: 121/72 (05 Jul 2019 06:02) (115/66 - 189/105)  BP(mean): 91 (05 Jul 2019 06:02) (83 - 110)  RR: 34 (05 Jul 2019 06:02) (22 - 41)  SpO2: 97% (05 Jul 2019 06:02) (92% - 98%)    I&O's Summary    04 Jul 2019 07:01  -  05 Jul 2019 07:00  --------------------------------------------------------  IN: 474 mL / OUT: 0 mL / NET: 474 mL        On Exam:    Constitutional: NAD, alert and oriented x 3  Lungs:  On Bipap, faint diffuse wheezing, diminished breath sounds  Cardiovascular: RRR.  S1 and S2 positive.  No murmurs, rubs, gallops or clicks  Gastrointestinal: Bowel Sounds present, soft, nontender.   Lymph: +3 edema b/l lower extremities. No cervical lymphadenopathy.  Neurological: Alert, no focal deficits, poor memory  Skin: No rashes or ulcers   Psych:  Mood & affect appropriate.    LABS: All Labs Reviewed:                        14.8   11.69 )-----------( 135      ( 05 Jul 2019 06:29 )             44.2                         14.9   20.51 )-----------( 193      ( 04 Jul 2019 21:04 )             45.8     05 Jul 2019 06:29    139    |  99     |  25     ----------------------------<  234    3.6     |  32     |  0.97   04 Jul 2019 21:04    138    |  104    |  26     ----------------------------<  250    3.9     |  27     |  0.77     Ca    8.5        05 Jul 2019 06:29  Ca    8.4        04 Jul 2019 21:04  Phos  2.5       05 Jul 2019 06:29  Mg     2.0       05 Jul 2019 06:29  Mg     2.1       04 Jul 2019 21:04    TPro  6.6    /  Alb  3.5    /  TBili  0.7    /  DBili  x      /  AST  16     /  ALT  20     /  AlkPhos  121    04 Jul 2019 21:04    PT/INR - ( 05 Jul 2019 06:29 )   PT: 14.5 sec;   INR: 1.27 ratio         PTT - ( 05 Jul 2019 06:29 )  PTT:139.5 sec  CARDIAC MARKERS ( 05 Jul 2019 06:29 )  1.890 ng/mL / x     / 94 U/L / x     / 7.4 ng/mL  CARDIAC MARKERS ( 05 Jul 2019 00:52 )  .835 ng/mL / x     / 62 U/L / x     / 6.0 ng/mL  CARDIAC MARKERS ( 04 Jul 2019 21:04 )  .066 ng/mL / x     / x     / x     / x          Blood Culture:   07-04 @ 21:04  Pro Bnp 511        RADIOLOGY: CXR clear

## 2019-07-05 NOTE — PHARMACOTHERAPY INTERVENTION NOTE - COMMENTS
pt being treated for pna with vancomycin, s/w ICU MD to add timed trough, will follow up results once it returns. MRSA swab sent out, if negative, ok to discontinue vanco

## 2019-07-05 NOTE — PROVIDER CONTACT NOTE (EICU) - SITUATION
85F  NHR with Hx: asthma, DVT, MI, diast CHF, HTN, depression BIBA w/SOB, wheezing and  cough. Pt placed on bipap, bronchodilators , steroids ,transferred to ICU for monitoring of asthma exac

## 2019-07-05 NOTE — PROGRESS NOTE ADULT - ASSESSMENT
84 y/o F with PMHx of Diastolic HF, bronchitis, and chronic LE edema presenting from [facility] with shortness of breath and cough, admitted for acute hypoxic respiratory failure with hypercapnia.

## 2019-07-05 NOTE — H&P ADULT - ATTENDING COMMENTS
84 y/o F with PMHx of Diastolic HF, bronchitis, and chronic LE edema presented to the [place] [facility] on 07/04/19 PATRICIA from [facility] with shortness of breath and cough. - EKG reviewed with ?st changes, d/w icu PA, repeat ekg obtained. cardiac enzymes trending up. ?NSTEMI. started on heparin gtt.

## 2019-07-05 NOTE — PROGRESS NOTE ADULT - SUBJECTIVE AND OBJECTIVE BOX
Patient is a 85y old  Female who presents with a chief complaint of SOB (05 Jul 2019 08:21)    24 hour events:   at 330am patient developed acute onset difficulty breathing, had bilateral wheezing on exam L>R, BP 140s/80s  sp02 96%, IPAP was increased to 19, stat duonebs given, methylprednisolone increased from 40iv q12 to 60mg IV q8, additional methylprednisolone 20mg ordered stat.  She improved with these treatments.      REVIEW OF SYSTEMS  Constitutional: No fever, chills, fatigue  Neuro: No headache, numbness, weakness  Resp: +chronic nonproductve cough, +wheezing, +shortness of breath  CVS: No chest pain, palpitations, +chronic leg swelling  GI: No abdominal pain, nausea, vomiting, diarrhea   : No dysuria, frequency, incontinence  Skin: No itching, burning, rashes, or lesions   Msk: No joint pain or swelling  Psych: No depression, anxiety, mood swings  Heme: No bleeding    T(F): 96.5 (07-05-19 @ 12:00), Max: 98.1 (07-05-19 @ 00:05)  HR: 51 (07-05-19 @ 14:44) (51 - 117)  BP: 122/60 (07-05-19 @ 14:30) (64/44 - 189/105)  RR: 31 (07-05-19 @ 14:30) (14 - 45)  SpO2: 100% (07-05-19 @ 14:44) (91% - 100%)  Wt(kg): --    Mode: AC/ CMV (Assist Control/ Continuous Mandatory Ventilation), RR (machine): 14, TV (machine): 300, FiO2: 40, PEEP: 5        I&O's Summary    07-04 @ 07:01  -  07-05 @ 07:00  --------------------------------------------------------  IN: 590 mL / OUT: 0 mL / NET: 590 mL    07-05 @ 07:01  -  07-05 @ 15:54  --------------------------------------------------------  IN: 279.9 mL / OUT: 0 mL / NET: 279.9 mL    Physical Exam:  General: in acute distress, sitting upright holdgn guard rails to use accessory muscles to ease the work of breathing, teary eyed uncomfortable on the BIPAP  HEENT: Normocephallic Atraumatic, PERRLA, EOMI bl, +dentures, moist mucous membranes   Neck: Supple, nontender, no cervical lymphadenopathy  Neurology: A&Ox3, no focal neurological deficits: CNII-XII intact  Respiratory: diffuse b/l inspiratory and expiratory wheezes  CV: tachycardic, regular rhythm, +S1/S2, systolic murmur   Abdominal: Soft, Non-Tender, Non-Distended +Bowel Soundsx4  Extremities: + edema bilateral lower extremities up to knee level, no cyanosis, + peripheral pulses: cap refill <2 secs LE bilaterally  Skin: warm, dry, normal color    MEDICATIONS  levoFLOXacin IVPB   vancomycin  IVPB IV Intermittent    norepinephrine Infusion IV Continuous    atorvastatin Oral  dextrose 40% Gel Oral PRN  dextrose 50% Injectable IV Push  dextrose 50% Injectable IV Push  dextrose 50% Injectable IV Push  glucagon  Injectable IntraMuscular PRN  insulin lispro (HumaLOG) corrective regimen sliding scale SubCutaneous  methylPREDNISolone sodium succinate Injectable IV Push    ALBUTerol    90 MICROgram(s) HFA Inhaler Inhalation  ALBUTerol    90 MICROgram(s) HFA Inhaler Inhalation PRN  guaiFENesin ER Oral    escitalopram Oral  melatonin Oral  propofol Infusion IV Continuous      aspirin  chewable Oral  enoxaparin Injectable SubCutaneous        dextrose 5%. IV Continuous      chlorhexidine 0.12% Liquid Oral Mucosa                            14.8   11.69 )-----------( 135      ( 05 Jul 2019 06:29 )             44.2       07-05    139  |  99  |  25<H>  ----------------------------<  234<H>  3.6   |  32<H>  |  0.97    Ca    8.5      05 Jul 2019 06:29  Phos  2.5     07-05  Mg     2.0     07-05    TPro  6.6  /  Alb  3.5  /  TBili  0.7  /  DBili  x   /  AST  16  /  ALT  20  /  AlkPhos  121<H>  07-04    Lactate 0.8           07-04 @ 21:54      CARDIAC MARKERS ( 05 Jul 2019 12:29 )  1.480 ng/mL / x     / 143 U/L / x     / 9.5 ng/mL  CARDIAC MARKERS ( 05 Jul 2019 06:29 )  1.890 ng/mL / x     / 94 U/L / x     / 7.4 ng/mL  CARDIAC MARKERS ( 05 Jul 2019 00:52 )  .835 ng/mL / x     / 62 U/L / x     / 6.0 ng/mL  CARDIAC MARKERS ( 04 Jul 2019 21:04 )  .066 ng/mL / x     / x     / x     / x          PT/INR - ( 05 Jul 2019 06:29 )   PT: 14.5 sec;   INR: 1.27 ratio         PTT - ( 05 Jul 2019 06:29 )  PTT:139.5 sec        Rapid RVP Result: Detected (07-05 @ 09:23)  enterovirus/rhinovirus: detected 7/5      Radiology:    EXAM:  XR CHEST PORTABLE IMMED 1V                            PROCEDURE DATE:  07/05/2019          INTERPRETATION:  Clinical information: Abnormal chest sounds. Wheezing.    Technique: Frontal view of the chest.    Comparison: Prior chest x-ray examination from 7/4/2018 at 8:54 PM.    Findings: The lungs are clear. The heart size is normal. Multilevel   degenerative changes are noted within the imaged potions of the spine.    IMPRESSION: Clear lungs without interval change.                JALEEL PAZ M.D., ATTENDING RADIOLOGIST  This document has been electronically signed. Jul 5 2019  9:22AM  EXAM:  XR CHEST PORTABLE URGENT 1V                            PROCEDURE DATE:  07/05/2019          INTERPRETATION:  Clinical information: Endotracheal tube placement.    Technique: Frontal view of the chest.    Comparison: Prior chest x-ray examination from 7/5/2019.    Findings: The patient is status post endotracheal tube placement in good   position. There is an NG Tube with its tip projecting below the   diaphragm. The patient is rotated to the left.    The lungs are clear. The heart size is normal. Multilevel degenerative   changes are noted within the imaged potions of the spine.    IMPRESSION: Interval intubation and placement of an NG tube.    No other interval changes.      Ventricular Rate 112 BPM    Atrial Rate 112 BPM    P-R Interval 164 ms    QRS Duration 84 ms    Q-T Interval 334 ms    QTC Calculation(Bezet) 455 ms    R Axis 24 degrees    T Axis 136 degrees    Diagnosis Line Sinus tachycardia  Marked ST abnormality, possible inferior subendocardial injury  Confirmed by MABLE WHIPPLE ([id number]) on 7/5/2019 7:53:31 AM      EXAM:  US DPLX LWR EXT VEINS COMPL BI                            PROCEDURE DATE:  07/05/2019          INTERPRETATION:  CLINICAL INFORMATION: Shortness of breath. Respiratory   failure. Possible DVT. No other clinical information is provided.    COMPARISON: None available.    TECHNIQUE: Duplex sonography of the BILATERAL LOWER extremity veins with   color and spectral Doppler, with and without compression.      FINDINGS:    There is normal compressibility of the bilateral common femoral, femoral   and popliteal veins.     Doppler examination shows normal spontaneous and phasic flow.    No calf vein thrombosis is detected.    IMPRESSION:     No evidence of deep venous thrombosis in either lower extremity.                    JALEEL PAZ M.D., ATTENDING RADIOLOGIST  This document has been electronically signed. Jul 5 2019 10:38AM                CENTRAL LINE: Y/N          DATE INSERTED:              REMOVE: Y/N  ALEJO: Y/N                        DATE INSERTED:              REMOVE: Y/N  A-LINE: Y/N                       DATE INSERTED:              REMOVE: Y/N    GLOBAL ISSUE/BEST PRACTICE  Analgesia:   Sedation:   CAM-ICU:   HOB elevation: yes  Stress ulcer prophylaxis:   VTE prophylaxis:   Glycemic control:   Nutrition:     CODE STATUS: ***  GOC discussion: Y Patient is a 85y old  Female who presents with a chief complaint of SOB (05 Jul 2019 08:21)    24 hour events:   at 330am patient developed acute onset difficulty breathing, had bilateral wheezing on exam L>R, BP 140s/80s  sp02 96%, IPAP was increased to 19, stat duonebs given, methylprednisolone increased from 40iv q12 to 60mg IV q8, additional methylprednisolone 20mg ordered stat.  Pt remained in respiratory distress with increased work of breathing, at risk of acute decompensation, the patient was thus intubated.     REVIEW OF SYSTEMS  Constitutional: No fever, chills, fatigue  Neuro: No headache, numbness, weakness  Resp: +chronic nonproductve cough, +wheezing, +shortness of breath  CVS: No chest pain, palpitations, +chronic leg swelling  GI: No abdominal pain, nausea, vomiting, diarrhea   : No dysuria, frequency, incontinence  Skin: No itching, burning, rashes, or lesions   Msk: No joint pain or swelling  Psych: + anxiety; No depression, mood swings  Heme: No bleeding    T(F): 96.5 (07-05-19 @ 12:00), Max: 98.1 (07-05-19 @ 00:05)  HR: 51 (07-05-19 @ 14:44) (51 - 117)  BP: 122/60 (07-05-19 @ 14:30) (64/44 - 189/105)  RR: 31 (07-05-19 @ 14:30) (14 - 45)  SpO2: 100% (07-05-19 @ 14:44) (91% - 100%)    Mode: AC/ CMV (Assist Control/ Continuous Mandatory Ventilation), RR (machine): 14, TV (machine): 300, FiO2: 40, PEEP: 5    I&O's Summary    07-04 @ 07:01  -  07-05 @ 07:00  --------------------------------------------------------  IN: 590 mL / OUT: 0 mL / NET: 590 mL    07-05 @ 07:01  -  07-05 @ 15:54  --------------------------------------------------------  IN: 279.9 mL / OUT: 0 mL / NET: 279.9 mL    Physical Exam:  General: in acute distress, sitting upright holding guard rails to use accessory muscles to ease the work of breathing, teary eyed uncomfortable on the BIPAP  HEENT: Normocephallic Atraumatic, PERRLA, EOMI bl, +dentures, moist mucous membranes   Neck: Supple, nontender, no cervical lymphadenopathy  Neurology: A&Ox3, no focal neurological deficits: CNII-XII intact  Respiratory: diffuse b/l inspiratory and expiratory wheezes  CV: tachycardic, regular rhythm, +S1/S2, systolic murmur   Abdominal: Soft, Non-Tender, Non-Distended +Bowel Soundsx4  Extremities: + edema bilateral lower extremities up to knee level, no cyanosis, + peripheral pulses: cap refill <2 secs LE bilaterally  Skin: warm, dry, normal color    MEDICATIONS  levoFLOXacin IVPB   vancomycin  IVPB IV Intermittent    norepinephrine Infusion IV Continuous    atorvastatin Oral  dextrose 40% Gel Oral PRN  dextrose 50% Injectable IV Push  dextrose 50% Injectable IV Push  dextrose 50% Injectable IV Push  glucagon  Injectable IntraMuscular PRN  insulin lispro (HumaLOG) corrective regimen sliding scale SubCutaneous  methylPREDNISolone sodium succinate Injectable IV Push    ALBUTerol    90 MICROgram(s) HFA Inhaler Inhalation  ALBUTerol    90 MICROgram(s) HFA Inhaler Inhalation PRN  guaiFENesin ER Oral    escitalopram Oral  melatonin Oral  propofol Infusion IV Continuous      aspirin  chewable Oral  enoxaparin Injectable SubCutaneous        dextrose 5%. IV Continuous      chlorhexidine 0.12% Liquid Oral Mucosa                            14.8   11.69 )-----------( 135      ( 05 Jul 2019 06:29 )             44.2       07-05    139  |  99  |  25<H>  ----------------------------<  234<H>  3.6   |  32<H>  |  0.97    Ca    8.5      05 Jul 2019 06:29  Phos  2.5     07-05  Mg     2.0     07-05    TPro  6.6  /  Alb  3.5  /  TBili  0.7  /  DBili  x   /  AST  16  /  ALT  20  /  AlkPhos  121<H>  07-04    Lactate 0.8           07-04 @ 21:54      CARDIAC MARKERS ( 05 Jul 2019 12:29 )  1.480 ng/mL / x     / 143 U/L / x     / 9.5 ng/mL  CARDIAC MARKERS ( 05 Jul 2019 06:29 )  1.890 ng/mL / x     / 94 U/L / x     / 7.4 ng/mL  CARDIAC MARKERS ( 05 Jul 2019 00:52 )  .835 ng/mL / x     / 62 U/L / x     / 6.0 ng/mL  CARDIAC MARKERS ( 04 Jul 2019 21:04 )  .066 ng/mL / x     / x     / x     / x          PT/INR - ( 05 Jul 2019 06:29 )   PT: 14.5 sec;   INR: 1.27 ratio         PTT - ( 05 Jul 2019 06:29 )  PTT:139.5 sec        Rapid RVP Result: Detected (07-05 @ 09:23)  enterovirus/rhinovirus: detected 7/5      Radiology:    EXAM:  XR CHEST PORTABLE IMMED 1V                            PROCEDURE DATE:  07/05/2019          INTERPRETATION:  Clinical information: Abnormal chest sounds. Wheezing.    Technique: Frontal view of the chest.    Comparison: Prior chest x-ray examination from 7/4/2018 at 8:54 PM.    Findings: The lungs are clear. The heart size is normal. Multilevel   degenerative changes are noted within the imaged potions of the spine.    IMPRESSION: Clear lungs without interval change.                JALEEL PAZ M.D., ATTENDING RADIOLOGIST  This document has been electronically signed. Jul 5 2019  9:22AM  EXAM:  XR CHEST PORTABLE URGENT 1V                            PROCEDURE DATE:  07/05/2019          INTERPRETATION:  Clinical information: Endotracheal tube placement.    Technique: Frontal view of the chest.    Comparison: Prior chest x-ray examination from 7/5/2019.    Findings: The patient is status post endotracheal tube placement in good   position. There is an NG Tube with its tip projecting below the   diaphragm. The patient is rotated to the left.    The lungs are clear. The heart size is normal. Multilevel degenerative   changes are noted within the imaged potions of the spine.    IMPRESSION: Interval intubation and placement of an NG tube.    No other interval changes.      Ventricular Rate 112 BPM    Atrial Rate 112 BPM    P-R Interval 164 ms    QRS Duration 84 ms    Q-T Interval 334 ms    QTC Calculation(Bezet) 455 ms    R Axis 24 degrees    T Axis 136 degrees    Diagnosis Line Sinus tachycardia  Marked ST abnormality, possible inferior subendocardial injury  Confirmed by MABLE WHIPPLE ([id number]) on 7/5/2019 7:53:31 AM      EXAM:  US DPLX LWR EXT VEINS COMPL BI                            PROCEDURE DATE:  07/05/2019          INTERPRETATION:  CLINICAL INFORMATION: Shortness of breath. Respiratory   failure. Possible DVT. No other clinical information is provided.    COMPARISON: None available.    TECHNIQUE: Duplex sonography of the BILATERAL LOWER extremity veins with   color and spectral Doppler, with and without compression.      FINDINGS:    There is normal compressibility of the bilateral common femoral, femoral   and popliteal veins.     Doppler examination shows normal spontaneous and phasic flow.    No calf vein thrombosis is detected.    IMPRESSION:     No evidence of deep venous thrombosis in either lower extremity.                    JALEEL PAZ M.D., ATTENDING RADIOLOGIST  This document has been electronically signed. Jul 5 2019 10:38AM                CENTRAL LINE: N   ALEJO: N   A-LINE: N    GLOBAL ISSUE/BEST PRACTICE  Analgesia: n  Sedation: propofol  CAM-ICU: negative  HOB elevation: yes  Stress ulcer prophylaxis: none  VTE prophylaxis: enoxaparin  Glycemic control: y   Nutrition: y     CODE STATUS: full code  GOC discussion: Y Patient is a 85y old  Female who presents with a chief complaint of SOB (05 Jul 2019 08:21)    24 hour events:   admitted overnight with dyspnea, respiratory failure attributed to asthma exacerbation, requiring BiPAP  at 330am patient developed acute onset difficulty breathing, had bilateral wheezing on exam L>R, BP 140s/80s  sp02 96%, IPAP was increased to 19, stat duonebs given, methylprednisolone increased from 40iv q12 to 60mg IV q8, additional methylprednisolone 20mg ordered stat.  Pt remained in respiratory distress with increased work of breathing, at risk of acute decompensation, the patient was intubated about 1pm today due to failure of BiPAP and escalating agitation.     REVIEW OF SYSTEMS  Constitutional: No fever, chills, fatigue  Neuro: No headache, numbness, weakness  Resp: +chronic nonproductve cough, +wheezing, +shortness of breath  CVS: No chest pain, palpitations, +chronic leg swelling  GI: No abdominal pain, nausea, vomiting, diarrhea   : No dysuria, frequency, incontinence  Skin: No itching, burning, rashes, or lesions   Msk: No joint pain or swelling  Psych: + anxiety; No depression, mood swings  Heme: No bleeding    T(F): 96.5 (07-05-19 @ 12:00), Max: 98.1 (07-05-19 @ 00:05)  HR: 51 (07-05-19 @ 14:44) (51 - 117)  BP: 122/60 (07-05-19 @ 14:30) (64/44 - 189/105)  RR: 31 (07-05-19 @ 14:30) (14 - 45)  SpO2: 100% (07-05-19 @ 14:44) (91% - 100%)    Mode: AC/ CMV (Assist Control/ Continuous Mandatory Ventilation), RR (machine): 14, TV (machine): 300, FiO2: 40, PEEP: 5    I&O's Summary    07-04 @ 07:01  -  07-05 @ 07:00  --------------------------------------------------------  IN: 590 mL / OUT: 0 mL / NET: 590 mL    07-05 @ 07:01  -  07-05 @ 15:54  --------------------------------------------------------  IN: 279.9 mL / OUT: 0 mL / NET: 279.9 mL    Physical Exam:  General: in acute distress, tripoding position, use accessory muscles to ease the work of breathing, teary eyed uncomfortable on the BIPAP  HEENT: Normocephallic Atraumatic, PERRLA, EOMI bl, +dentures, moist mucous membranes   Neck: Supple, nontender, no cervical lymphadenopathy  Neurology: A&Ox3, no focal neurological deficits: CNII-XII intact  Respiratory: diffuse b/l inspiratory and expiratory wheezes, high pitched  CV: tachycardic, regular rhythm, +S1/S2, systolic murmur   Abdominal: Soft, Non-Tender, Non-Distended +Bowel Soundsx4  Extremities: + edema bilateral lower extremities up to knee level, no cyanosis, + peripheral pulses: cap refill <2 secs LE bilaterally  Skin: warm, dry, normal color    MEDICATIONS  levoFLOXacin IVPB   vancomycin  IVPB IV Intermittent    norepinephrine Infusion IV Continuous    atorvastatin Oral  dextrose 40% Gel Oral PRN  dextrose 50% Injectable IV Push  dextrose 50% Injectable IV Push  dextrose 50% Injectable IV Push  glucagon  Injectable IntraMuscular PRN  insulin lispro (HumaLOG) corrective regimen sliding scale SubCutaneous  methylPREDNISolone sodium succinate Injectable IV Push    ALBUTerol    90 MICROgram(s) HFA Inhaler Inhalation  ALBUTerol    90 MICROgram(s) HFA Inhaler Inhalation PRN  guaiFENesin ER Oral    escitalopram Oral  melatonin Oral  propofol Infusion IV Continuous      aspirin  chewable Oral  enoxaparin Injectable SubCutaneous        dextrose 5%. IV Continuous      chlorhexidine 0.12% Liquid Oral Mucosa                            14.8   11.69 )-----------( 135      ( 05 Jul 2019 06:29 )             44.2       07-05    139  |  99  |  25<H>  ----------------------------<  234<H>  3.6   |  32<H>  |  0.97    Ca    8.5      05 Jul 2019 06:29  Phos  2.5     07-05  Mg     2.0     07-05    TPro  6.6  /  Alb  3.5  /  TBili  0.7  /  DBili  x   /  AST  16  /  ALT  20  /  AlkPhos  121<H>  07-04    Lactate 0.8           07-04 @ 21:54      CARDIAC MARKERS ( 05 Jul 2019 12:29 )  1.480 ng/mL / x     / 143 U/L / x     / 9.5 ng/mL  CARDIAC MARKERS ( 05 Jul 2019 06:29 )  1.890 ng/mL / x     / 94 U/L / x     / 7.4 ng/mL  CARDIAC MARKERS ( 05 Jul 2019 00:52 )  .835 ng/mL / x     / 62 U/L / x     / 6.0 ng/mL  CARDIAC MARKERS ( 04 Jul 2019 21:04 )  .066 ng/mL / x     / x     / x     / x          PT/INR - ( 05 Jul 2019 06:29 )   PT: 14.5 sec;   INR: 1.27 ratio         PTT - ( 05 Jul 2019 06:29 )  PTT:139.5 sec        Rapid RVP Result: Detected (07-05 @ 09:23)  enterovirus/rhinovirus: detected 7/5      bedside US--A line predominant b/l    Radiology:    EXAM:  XR CHEST PORTABLE IMMED 1V                            PROCEDURE DATE:  07/05/2019          INTERPRETATION:  Clinical information: Abnormal chest sounds. Wheezing.    Technique: Frontal view of the chest.    Comparison: Prior chest x-ray examination from 7/4/2018 at 8:54 PM.    Findings: The lungs are clear. The heart size is normal. Multilevel   degenerative changes are noted within the imaged potions of the spine.    IMPRESSION: Clear lungs without interval change.                JALEEL PAZ M.D., ATTENDING RADIOLOGIST  This document has been electronically signed. Jul 5 2019  9:22AM  EXAM:  XR CHEST PORTABLE URGENT 1V                            PROCEDURE DATE:  07/05/2019          INTERPRETATION:  Clinical information: Endotracheal tube placement.    Technique: Frontal view of the chest.    Comparison: Prior chest x-ray examination from 7/5/2019.    Findings: The patient is status post endotracheal tube placement in good   position. There is an NG Tube with its tip projecting below the   diaphragm. The patient is rotated to the left.    The lungs are clear. The heart size is normal. Multilevel degenerative   changes are noted within the imaged potions of the spine.    IMPRESSION: Interval intubation and placement of an NG tube.    No other interval changes.      Ventricular Rate 112 BPM    Atrial Rate 112 BPM    P-R Interval 164 ms    QRS Duration 84 ms    Q-T Interval 334 ms    QTC Calculation(Bezet) 455 ms    R Axis 24 degrees    T Axis 136 degrees    Diagnosis Line Sinus tachycardia  Marked ST abnormality, possible inferior subendocardial injury  Confirmed by MABLE WHIPPLE ([id number]) on 7/5/2019 7:53:31 AM      EXAM:  US DPLX LWR EXT VEINS COMPL BI                            PROCEDURE DATE:  07/05/2019          INTERPRETATION:  CLINICAL INFORMATION: Shortness of breath. Respiratory   failure. Possible DVT. No other clinical information is provided.    COMPARISON: None available.    TECHNIQUE: Duplex sonography of the BILATERAL LOWER extremity veins with   color and spectral Doppler, with and without compression.      FINDINGS:    There is normal compressibility of the bilateral common femoral, femoral   and popliteal veins.     Doppler examination shows normal spontaneous and phasic flow.    No calf vein thrombosis is detected.    IMPRESSION:     No evidence of deep venous thrombosis in either lower extremity.                    JALEEL PAZ M.D., ATTENDING RADIOLOGIST  This document has been electronically signed. Jul 5 2019 10:38AM                CENTRAL LINE: N   ALEJO: N   A-LINE: N    GLOBAL ISSUE/BEST PRACTICE  Analgesia: n  Sedation: propofol  CAM-ICU: negative  HOB elevation: yes  Stress ulcer prophylaxis: none  VTE prophylaxis: enoxaparin  Glycemic control: y   Nutrition: y     CODE STATUS: full code  GOC discussion: Y

## 2019-07-05 NOTE — H&P ADULT - NSICDXPASTMEDICALHX_GEN_ALL_CORE_FT
PAST MEDICAL HISTORY:  Anxiety disorder     Asthma     Bronchitis     Chronic embolism and thrombosis of unspecified deep veins of right lower extremity     Cognitive communication deficit     Edema     Essential (primary) hypertension     Hypokalemia     Insomnia     Major depressive disorder     Muscle weakness (generalized)     NSTEMI (non-ST elevated myocardial infarction)     Pain     Unspecified diastolic (congestive) heart failure

## 2019-07-05 NOTE — CONSULT NOTE ADULT - ATTENDING COMMENTS
Telephone Encounter by Adis Zaldivar MD at 11/10/17 01:08 PM     Author:  Adis Zaldivar MD Service:  (none) Author Type:  Physician     Filed:  11/10/17 01:08 PM Encounter Date:  11/10/2017 Status:  Signed     :  Adis Zaldivar MD (Physician)            Visited patient in hospital, please make a follow up apt next week[AS1.1M]        Revision History        User Key Date/Time User Provider Type Action    > AS1.1 11/10/17 01:08 PM Adis Zaldivar MD Physician Sign    M - Manual
Telephone Encounter by Elo Kumar RN, BSN at 11/15/17 10:21 AM     Author:  Elo Kumar RN, BSN Service:  (none) Author Type:  Registered Nurse     Filed:  11/15/17 10:22 AM Encounter Date:  11/10/2017 Status:  Signed     :  Elo Kumar RN, BSN (Registered Nurse)            Left message on answering machine to call back. [KP1.1T] See additional telephone encounter.[KP1.1M]       Revision History        User Key Date/Time User Provider Type Action    > KP1.1 11/15/17 10:22 AM Elo Kumar RN, BSN Registered Nurse Sign    M - Manual, T - Template
I personally saw and examined the patient in detail.  I have spoken to the above provider regarding the assessment and plan of care.  I reviewed the above assessment and plan of care, and agree.  I have made changes in the body of the note where appropriate.  Patient is critically ill with a high risk of decompensation.  I have personally provided 35 minutes of critical care time excluding time spent on separate procedures.

## 2019-07-06 LAB
ALBUMIN SERPL ELPH-MCNC: 2.7 G/DL — LOW (ref 3.3–5)
ALP SERPL-CCNC: 88 U/L — SIGNIFICANT CHANGE UP (ref 40–120)
ALT FLD-CCNC: 19 U/L — SIGNIFICANT CHANGE UP (ref 12–78)
ANION GAP SERPL CALC-SCNC: 10 MMOL/L — SIGNIFICANT CHANGE UP (ref 5–17)
AST SERPL-CCNC: 17 U/L — SIGNIFICANT CHANGE UP (ref 15–37)
BASOPHILS # BLD AUTO: 0.02 K/UL — SIGNIFICANT CHANGE UP (ref 0–0.2)
BASOPHILS NFR BLD AUTO: 0.1 % — SIGNIFICANT CHANGE UP (ref 0–2)
BILIRUB SERPL-MCNC: 0.4 MG/DL — SIGNIFICANT CHANGE UP (ref 0.2–1.2)
BUN SERPL-MCNC: 26 MG/DL — HIGH (ref 7–23)
CALCIUM SERPL-MCNC: 8 MG/DL — LOW (ref 8.5–10.1)
CHLORIDE SERPL-SCNC: 102 MMOL/L — SIGNIFICANT CHANGE UP (ref 96–108)
CO2 SERPL-SCNC: 27 MMOL/L — SIGNIFICANT CHANGE UP (ref 22–31)
CREAT SERPL-MCNC: 0.91 MG/DL — SIGNIFICANT CHANGE UP (ref 0.5–1.3)
EOSINOPHIL # BLD AUTO: 0 K/UL — SIGNIFICANT CHANGE UP (ref 0–0.5)
EOSINOPHIL NFR BLD AUTO: 0 % — SIGNIFICANT CHANGE UP (ref 0–6)
GLUCOSE SERPL-MCNC: 211 MG/DL — HIGH (ref 70–99)
GRAM STN FLD: SIGNIFICANT CHANGE UP
HBA1C BLD-MCNC: 5.6 % — SIGNIFICANT CHANGE UP (ref 4–5.6)
HCT VFR BLD CALC: 40.1 % — SIGNIFICANT CHANGE UP (ref 34.5–45)
HGB BLD-MCNC: 13.4 G/DL — SIGNIFICANT CHANGE UP (ref 11.5–15.5)
IMM GRANULOCYTES NFR BLD AUTO: 0.6 % — SIGNIFICANT CHANGE UP (ref 0–1.5)
LEGIONELLA AG UR QL: NEGATIVE — SIGNIFICANT CHANGE UP
LYMPHOCYTES # BLD AUTO: 0.88 K/UL — LOW (ref 1–3.3)
LYMPHOCYTES # BLD AUTO: 4.7 % — LOW (ref 13–44)
MAGNESIUM SERPL-MCNC: 2 MG/DL — SIGNIFICANT CHANGE UP (ref 1.6–2.6)
MCHC RBC-ENTMCNC: 29.1 PG — SIGNIFICANT CHANGE UP (ref 27–34)
MCHC RBC-ENTMCNC: 33.4 GM/DL — SIGNIFICANT CHANGE UP (ref 32–36)
MCV RBC AUTO: 87 FL — SIGNIFICANT CHANGE UP (ref 80–100)
MONOCYTES # BLD AUTO: 1.01 K/UL — HIGH (ref 0–0.9)
MONOCYTES NFR BLD AUTO: 5.4 % — SIGNIFICANT CHANGE UP (ref 2–14)
NEUTROPHILS # BLD AUTO: 16.57 K/UL — HIGH (ref 1.8–7.4)
NEUTROPHILS NFR BLD AUTO: 89.2 % — HIGH (ref 43–77)
NRBC # BLD: 0 /100 WBCS — SIGNIFICANT CHANGE UP (ref 0–0)
PHOSPHATE SERPL-MCNC: 2.4 MG/DL — LOW (ref 2.5–4.5)
PLATELET # BLD AUTO: 189 K/UL — SIGNIFICANT CHANGE UP (ref 150–400)
POTASSIUM SERPL-MCNC: 3.3 MMOL/L — LOW (ref 3.5–5.3)
POTASSIUM SERPL-SCNC: 3.3 MMOL/L — LOW (ref 3.5–5.3)
PROT SERPL-MCNC: 5.9 G/DL — LOW (ref 6–8.3)
RBC # BLD: 4.61 M/UL — SIGNIFICANT CHANGE UP (ref 3.8–5.2)
RBC # FLD: 12.8 % — SIGNIFICANT CHANGE UP (ref 10.3–14.5)
SODIUM SERPL-SCNC: 139 MMOL/L — SIGNIFICANT CHANGE UP (ref 135–145)
SPECIMEN SOURCE: SIGNIFICANT CHANGE UP
WBC # BLD: 18.59 K/UL — HIGH (ref 3.8–10.5)
WBC # FLD AUTO: 18.59 K/UL — HIGH (ref 3.8–10.5)

## 2019-07-06 PROCEDURE — 99291 CRITICAL CARE FIRST HOUR: CPT

## 2019-07-06 PROCEDURE — 93010 ELECTROCARDIOGRAM REPORT: CPT

## 2019-07-06 PROCEDURE — 99233 SBSQ HOSP IP/OBS HIGH 50: CPT

## 2019-07-06 PROCEDURE — 93306 TTE W/DOPPLER COMPLETE: CPT | Mod: 26

## 2019-07-06 RX ORDER — IPRATROPIUM BROMIDE 0.2 MG/ML
4 SOLUTION, NON-ORAL INHALATION EVERY 6 HOURS
Refills: 0 | Status: DISCONTINUED | OUTPATIENT
Start: 2019-07-06 | End: 2019-07-07

## 2019-07-06 RX ORDER — FAMOTIDINE 10 MG/ML
20 INJECTION INTRAVENOUS DAILY
Refills: 0 | Status: DISCONTINUED | OUTPATIENT
Start: 2019-07-06 | End: 2019-07-10

## 2019-07-06 RX ORDER — SODIUM,POTASSIUM PHOSPHATES 278-250MG
1 POWDER IN PACKET (EA) ORAL ONCE
Refills: 0 | Status: COMPLETED | OUTPATIENT
Start: 2019-07-06 | End: 2019-07-06

## 2019-07-06 RX ORDER — POTASSIUM CHLORIDE 20 MEQ
40 PACKET (EA) ORAL EVERY 4 HOURS
Refills: 0 | Status: COMPLETED | OUTPATIENT
Start: 2019-07-06 | End: 2019-07-06

## 2019-07-06 RX ORDER — ALBUTEROL 90 UG/1
4 AEROSOL, METERED ORAL EVERY 6 HOURS
Refills: 0 | Status: DISCONTINUED | OUTPATIENT
Start: 2019-07-06 | End: 2019-07-07

## 2019-07-06 RX ORDER — ALBUTEROL 90 UG/1
2 AEROSOL, METERED ORAL EVERY 6 HOURS
Refills: 0 | Status: DISCONTINUED | OUTPATIENT
Start: 2019-07-06 | End: 2019-07-06

## 2019-07-06 RX ADMIN — FENTANYL CITRATE 50 MICROGRAM(S): 50 INJECTION INTRAVENOUS at 00:41

## 2019-07-06 RX ADMIN — ALBUTEROL 2 PUFF(S): 90 AEROSOL, METERED ORAL at 02:37

## 2019-07-06 RX ADMIN — Medication 2: at 05:52

## 2019-07-06 RX ADMIN — ESCITALOPRAM OXALATE 10 MILLIGRAM(S): 10 TABLET, FILM COATED ORAL at 12:03

## 2019-07-06 RX ADMIN — ALBUTEROL 2 PUFF(S): 90 AEROSOL, METERED ORAL at 11:04

## 2019-07-06 RX ADMIN — ALBUTEROL 2 PUFF(S): 90 AEROSOL, METERED ORAL at 07:59

## 2019-07-06 RX ADMIN — Medication 40 MILLIEQUIVALENT(S): at 17:37

## 2019-07-06 RX ADMIN — FENTANYL CITRATE 50 MICROGRAM(S): 50 INJECTION INTRAVENOUS at 01:11

## 2019-07-06 RX ADMIN — Medication 40 MILLIGRAM(S): at 05:30

## 2019-07-06 RX ADMIN — Medication 1 PACKET(S): at 12:03

## 2019-07-06 RX ADMIN — CHLORHEXIDINE GLUCONATE 15 MILLILITER(S): 213 SOLUTION TOPICAL at 17:37

## 2019-07-06 RX ADMIN — Medication 81 MILLIGRAM(S): at 12:03

## 2019-07-06 RX ADMIN — Medication 250 MILLIGRAM(S): at 07:55

## 2019-07-06 RX ADMIN — PROPOFOL 8.16 MICROGRAM(S)/KG/MIN: 10 INJECTION, EMULSION INTRAVENOUS at 05:29

## 2019-07-06 RX ADMIN — FAMOTIDINE 20 MILLIGRAM(S): 10 INJECTION INTRAVENOUS at 18:34

## 2019-07-06 RX ADMIN — CHLORHEXIDINE GLUCONATE 15 MILLILITER(S): 213 SOLUTION TOPICAL at 05:29

## 2019-07-06 RX ADMIN — Medication 4 PUFF(S): at 20:14

## 2019-07-06 RX ADMIN — Medication 40 MILLIGRAM(S): at 13:33

## 2019-07-06 RX ADMIN — PROPOFOL 8.16 MICROGRAM(S)/KG/MIN: 10 INJECTION, EMULSION INTRAVENOUS at 00:41

## 2019-07-06 RX ADMIN — Medication 40 MILLIGRAM(S): at 17:37

## 2019-07-06 RX ADMIN — Medication 6.38 MICROGRAM(S)/KG/MIN: at 05:30

## 2019-07-06 RX ADMIN — PROPOFOL 8.16 MICROGRAM(S)/KG/MIN: 10 INJECTION, EMULSION INTRAVENOUS at 18:34

## 2019-07-06 RX ADMIN — Medication 1: at 12:03

## 2019-07-06 RX ADMIN — ENOXAPARIN SODIUM 40 MILLIGRAM(S): 100 INJECTION SUBCUTANEOUS at 12:03

## 2019-07-06 RX ADMIN — ALBUTEROL 4 PUFF(S): 90 AEROSOL, METERED ORAL at 20:14

## 2019-07-06 RX ADMIN — ATORVASTATIN CALCIUM 80 MILLIGRAM(S): 80 TABLET, FILM COATED ORAL at 21:37

## 2019-07-06 RX ADMIN — Medication 40 MILLIEQUIVALENT(S): at 21:37

## 2019-07-06 NOTE — PROGRESS NOTE ADULT - SUBJECTIVE AND OBJECTIVE BOX
Patient is a 85y old  Female who presents with a chief complaint of SOB (05 Jul 2019 16:59)    PAST MEDICAL & SURGICAL HISTORY:  Asthma  Pain  NSTEMI (non-ST elevated myocardial infarction)  Chronic embolism and thrombosis of unspecified deep veins of right lower extremity  Anxiety disorder  Major depressive disorder  Bronchitis  Cognitive communication deficit  Muscle weakness (generalized)  Insomnia  Hypokalemia  Edema  Essential (primary) hypertension  Unspecified diastolic (congestive) heart failure  No significant past surgical history    NICOLE LEÓN   85y    Female    Review of Systems: UATO vented                     Allergies    penicillin (Unknown)    Intolerances          ICU Vital Signs Last 24 Hrs  T(C): 36.5 (06 Jul 2019 00:11), Max: 36.7 (05 Jul 2019 03:24)  T(F): 97.7 (06 Jul 2019 00:11), Max: 98 (05 Jul 2019 03:24)  HR: 60 (06 Jul 2019 01:30) (51 - 112)  BP: 100/50 (06 Jul 2019 01:00) (64/44 - 157/88)  BP(mean): 76 (06 Jul 2019 00:01) (50 - 966)  ABP: --  ABP(mean): --  RR: 38 (06 Jul 2019 01:30) (14 - 45)  SpO2: 100% (06 Jul 2019 01:30) (91% - 100%)    Physical Examination:    General:  sedate     HEENT: no JVD     PULM:  few scattered wheezes bilaterally     CVS: s1 s2 reg    ABD: soft NT    EXT: no edema     SKIN: warm    Neuro: RASS -1    ABG - ( 05 Jul 2019 14:41 )  pH, Arterial: 7.36  pH, Blood: x     /  pCO2: 45    /  pO2: 214   / HCO3: 24    / Base Excess: 0.5   /  SaO2: 100               Mode: AC/ CMV (Assist Control/ Continuous Mandatory Ventilation)  RR (machine): 14  TV (machine): 300  FiO2: 40  PEEP: 5  ITime: 1  MAP: 9.5  PIP: 21    Mode: AC/ CMV (Assist Control/ Continuous Mandatory Ventilation), RR (machine): 14, TV (machine): 300, FiO2: 40, PEEP: 5, ITime: 1, MAP: 9.5, PIP: 21  LABS:                        14.8   11.69 )-----------( 135      ( 05 Jul 2019 06:29 )             44.2     07-05    139  |  99  |  25<H>  ----------------------------<  234<H>  3.6   |  32<H>  |  0.97    Ca    8.5      05 Jul 2019 06:29  Phos  2.5     07-05  Mg     2.0     07-05    TPro  6.6  /  Alb  3.5  /  TBili  0.7  /  DBili  x   /  AST  16  /  ALT  20  /  AlkPhos  121<H>  07-04      CARDIAC MARKERS ( 05 Jul 2019 12:29 )  1.480 ng/mL / x     / 143 U/L / x     / 9.5 ng/mL  CARDIAC MARKERS ( 05 Jul 2019 06:29 )  1.890 ng/mL / x     / 94 U/L / x     / 7.4 ng/mL  CARDIAC MARKERS ( 05 Jul 2019 00:52 )  .835 ng/mL / x     / 62 U/L / x     / 6.0 ng/mL  CARDIAC MARKERS ( 04 Jul 2019 21:04 )  .066 ng/mL / x     / x     / x     / x          CAPILLARY BLOOD GLUCOSE      POCT Blood Glucose.: 249 mg/dL (05 Jul 2019 23:41)  POCT Blood Glucose.: 262 mg/dL (05 Jul 2019 17:50)  POCT Blood Glucose.: 181 mg/dL (05 Jul 2019 11:50)    PT/INR - ( 05 Jul 2019 06:29 )   PT: 14.5 sec;   INR: 1.27 ratio         PTT - ( 05 Jul 2019 06:29 )  PTT:139.5 sec    CULTURES:  Rapid RVP Result: Detected (07-05 @ 09:23)      Medications:  MEDICATIONS  (STANDING):  ALBUTerol    90 MICROgram(s) HFA Inhaler 2 Puff(s) Inhalation every 4 hours  aspirin  chewable 81 milliGRAM(s) Oral daily  atorvastatin 80 milliGRAM(s) Oral at bedtime  chlorhexidine 0.12% Liquid 15 milliLiter(s) Oral Mucosa two times a day  dextrose 5%. 1000 milliLiter(s) (50 mL/Hr) IV Continuous <Continuous>  dextrose 50% Injectable 12.5 Gram(s) IV Push once  dextrose 50% Injectable 25 Gram(s) IV Push once  dextrose 50% Injectable 25 Gram(s) IV Push once  enoxaparin Injectable 40 milliGRAM(s) SubCutaneous daily  escitalopram 10 milliGRAM(s) Oral daily  guaiFENesin  milliGRAM(s) Oral every 12 hours  insulin lispro (HumaLOG) corrective regimen sliding scale   SubCutaneous every 6 hours  levoFLOXacin IVPB 750 milliGRAM(s) IV Intermittent every 24 hours  levoFLOXacin IVPB      melatonin 5 milliGRAM(s) Oral at bedtime  methylPREDNISolone sodium succinate Injectable 40 milliGRAM(s) IV Push every 8 hours  norepinephrine Infusion 0.05 MICROgram(s)/kG/Min (6.375 mL/Hr) IV Continuous <Continuous>  propofol Infusion 20 MICROgram(s)/kG/Min (8.16 mL/Hr) IV Continuous <Continuous>  vancomycin  IVPB 1000 milliGRAM(s) IV Intermittent every 24 hours    MEDICATIONS  (PRN):  ALBUTerol    90 MICROgram(s) HFA Inhaler 2 Puff(s) Inhalation every 2 hours PRN Shortness of Breath and/or Wheezing  dextrose 40% Gel 15 Gram(s) Oral once PRN Blood Glucose LESS THAN 70 milliGRAM(s)/deciliter  fentaNYL    Injectable 50 MICROGram(s) IV Push every 2 hours PRN sedation  glucagon  Injectable 1 milliGRAM(s) IntraMuscular once PRN Glucose LESS THAN 70 milligrams/deciliter        07-04 @ 07:01  -  07-05 @ 07:00  --------------------------------------------------------  IN: 590 mL / OUT: 0 mL / NET: 590 mL    07-05 @ 07:01  -  07-06 @ 01:52  --------------------------------------------------------  IN: 1337.7 mL / OUT: 100 mL / NET: 1237.7 mL        RADIOLOGY/IMAGING/ECHO    < from: Xray Chest 1 View- PORTABLE-Urgent (07.05.19 @ 14:14) >  INTERPRETATION:  Clinical information: Endotracheal tube placement.    Technique: Frontal view of the chest.    Comparison: Prior chest x-ray examination from 7/5/2019.    Findings: The patient is status post endotracheal tube placement in good   position. There is an NG Tube with its tip projecting below the   diaphragm. The patient is rotated to the left.    The lungs are clear. The heart size is normal. Multilevel degenerative   changes are noted within the imaged potions of the spine.    IMPRESSION: Interval intubation and placement of an NG tube.    No other interval changes.        Assessment/Plan:    86 yo F with Hx asthma, with previous mech ventilation  presenting with acute hypercapnia with + Enterorhinovirus with status asthmaticus and respiratory failure.    PAP's in high teens to low 20's     Neuro Sedate with propofol.   Fentanyl ordered fro co-sedation.  She is trying to pull out ETT on high dose  propofol   Cor   HS datble SR  Pulm SBT this AM   steroids BB  Gi  Feed glucerna to goal hold for SBT.   Renal no issues   Heme/DVT lovenox   ID  vanco/levaquin pending cultures.  Hope to limit ABX.    Endo  IS  POC glucose OK fro ow.  Taper steroids soon.    Lines/tubes  PIV    CRITICAL CARE TIME SPENT: 33 minutes assessing presenting problems of acute illness, which pose high probability of life threatening deterioration or end organ damage/dysfunction, as well as medical decision making including initiating plan of care, reviewing data, reviewing radiologic exams, discussing with multidisciplinary team,  discussing goals of care with patient/family, and writing this note.  Non-inclusive of procedures performed,

## 2019-07-06 NOTE — PROGRESS NOTE ADULT - PROBLEM SELECTOR PLAN 1
- intubated/sedated in MICU  - acute respiratory failure likely multifactorial but enterorhinovirus and status asthmaticus are contributing to condition, suspect superimposed bacterial PNA present as well, suspect gram positive cocci  - sepsis has persisted 2/2 above. hypothermic overnight. septic shock requiring vasopressors.   - vanco d/c'd as MRSA nasal PCR is negative (positive for staph but not MRSA)  - c/w levaquin per MICU recs  - solu-medrol 40q12 for now  - supportive care  - on vasopressors, weaning as tolerated  - agitation, pulling on ETT, sedation per MICU (on propofol + fentanyl today)

## 2019-07-06 NOTE — PROGRESS NOTE ADULT - SUBJECTIVE AND OBJECTIVE BOX
[facility] - Modesto Moreno, Kirill, Marco A, Paul, July Navarro  Office Number:  [phone]    Patient resting comfortably in bed in NAD.  Intubated yesterday for progressive respiratory failure and high work of breathing related to enterorhinovirus and status asthmaticus.    F/U for:  NSTEMI    Telemetry:  NSR    MEDICATIONS  (STANDING):  ALBUTerol    90 MICROgram(s) HFA Inhaler 2 Puff(s) Inhalation every 4 hours  aspirin  chewable 81 milliGRAM(s) Oral daily  atorvastatin 80 milliGRAM(s) Oral at bedtime  chlorhexidine 0.12% Liquid 15 milliLiter(s) Oral Mucosa two times a day  dextrose 5%. 1000 milliLiter(s) (50 mL/Hr) IV Continuous <Continuous>  dextrose 50% Injectable 12.5 Gram(s) IV Push once  dextrose 50% Injectable 25 Gram(s) IV Push once  dextrose 50% Injectable 25 Gram(s) IV Push once  enoxaparin Injectable 40 milliGRAM(s) SubCutaneous daily  escitalopram 10 milliGRAM(s) Oral daily  insulin lispro (HumaLOG) corrective regimen sliding scale   SubCutaneous every 6 hours  levoFLOXacin IVPB 750 milliGRAM(s) IV Intermittent every 24 hours  levoFLOXacin IVPB      methylPREDNISolone sodium succinate Injectable 40 milliGRAM(s) IV Push every 8 hours  norepinephrine Infusion 0.05 MICROgram(s)/kG/Min (6.375 mL/Hr) IV Continuous <Continuous>  propofol Infusion 20 MICROgram(s)/kG/Min (8.16 mL/Hr) IV Continuous <Continuous>  vancomycin  IVPB 1000 milliGRAM(s) IV Intermittent every 24 hours    MEDICATIONS  (PRN):  ALBUTerol    90 MICROgram(s) HFA Inhaler 2 Puff(s) Inhalation every 2 hours PRN Shortness of Breath and/or Wheezing  dextrose 40% Gel 15 Gram(s) Oral once PRN Blood Glucose LESS THAN 70 milliGRAM(s)/deciliter  fentaNYL    Injectable 50 MICROGram(s) IV Push every 2 hours PRN sedation  glucagon  Injectable 1 milliGRAM(s) IntraMuscular once PRN Glucose LESS THAN 70 milligrams/deciliter      Allergies    penicillin (Unknown)      Vital Signs Last 24 Hrs  T(C): 36.4 (06 Jul 2019 04:07), Max: 36.5 (06 Jul 2019 00:11)  T(F): 97.6 (06 Jul 2019 04:07), Max: 97.7 (06 Jul 2019 00:11)  HR: 76 (06 Jul 2019 06:11) (51 - 104)  BP: 97/52 (06 Jul 2019 06:00) (64/44 - 157/88)  BP(mean): 67 (06 Jul 2019 06:00) (50 - 966)  RR: 24 (06 Jul 2019 06:00) (14 - 45)  SpO2: 100% (06 Jul 2019 06:11) (91% - 100%)    I&O's Summary    04 Jul 2019 07:01  -  05 Jul 2019 07:00  --------------------------------------------------------  IN: 590 mL / OUT: 0 mL / NET: 590 mL    05 Jul 2019 07:01  -  06 Jul 2019 06:32  --------------------------------------------------------  IN: 1668.7 mL / OUT: 550 mL / NET: 1118.7 mL        ON EXAM:    General: NAD, intubted, sedated  HEENT: Mucous membranes are Dry.  ETT and OGT in place  Lungs: Non-labored, breath sounds are clear bilaterally.  b/l rhonchi  Cardiovascular: Regular, S1 and S2, no murmurs, rubs, or gallops  Gastrointestinal: Bowel Sounds present, soft, nontender.   Lymph: b/l peripheral edema. No lymphadenopathy.  Skin: No rashes or ulcers  Psych:  Unable to assess    LABS: All Labs Reviewed:                        14.8   11.69 )-----------( 135      ( 05 Jul 2019 06:29 )             44.2                         14.9   20.51 )-----------( 193      ( 04 Jul 2019 21:04 )             45.8     05 Jul 2019 06:29    139    |  99     |  25     ----------------------------<  234    3.6     |  32     |  0.97   04 Jul 2019 21:04    138    |  104    |  26     ----------------------------<  250    3.9     |  27     |  0.77     Ca    8.5        05 Jul 2019 06:29  Ca    8.4        04 Jul 2019 21:04  Phos  2.5       05 Jul 2019 06:29  Mg     2.0       05 Jul 2019 06:29  Mg     2.1       04 Jul 2019 21:04    TPro  6.6    /  Alb  3.5    /  TBili  0.7    /  DBili  x      /  AST  16     /  ALT  20     /  AlkPhos  121    04 Jul 2019 21:04    PT/INR - ( 05 Jul 2019 06:29 )   PT: 14.5 sec;   INR: 1.27 ratio         PTT - ( 05 Jul 2019 06:29 )  PTT:139.5 sec  CARDIAC MARKERS ( 05 Jul 2019 12:29 )  1.480 ng/mL / x     / 143 U/L / x     / 9.5 ng/mL  CARDIAC MARKERS ( 05 Jul 2019 06:29 )  1.890 ng/mL / x     / 94 U/L / x     / 7.4 ng/mL  CARDIAC MARKERS ( 05 Jul 2019 00:52 )  .835 ng/mL / x     / 62 U/L / x     / 6.0 ng/mL  CARDIAC MARKERS ( 04 Jul 2019 21:04 )  .066 ng/mL / x     / x     / x     / x          Blood Culture: Organism --  Gram Stain Blood -- Gram Stain   Numerous polymorphonuclear leukocytes per low power field  Rare Squamous epithelial cells per low power field  Rare Gram Positive Cocci in Clusters seen per oil power field  Specimen Source .Sputum Sputum  Culture-Blood --      07-04 @ 21:04  Pro Bnp 511

## 2019-07-06 NOTE — PROGRESS NOTE ADULT - ASSESSMENT
86 y/o F with PMHx of Diastolic HF, bronchitis, and chronic LE edema presenting from [facility] with shortness of breath and cough, admitted for acute hypoxic respiratory failure with hypercapnia.

## 2019-07-06 NOTE — PROGRESS NOTE ADULT - ASSESSMENT
86 y/o F with PMHx of Diastolic HF, bronchitis, and chronic LE edema presenting from [facility] with shortness of breath and cough, admitted for acute hypoxic respiratory failure with hypercapnia, status asthmaticus, enterorhinovirus, intubated, positive troponin      - Troponin levels seem to have plateaued.  The trend, along with the CK, is not suggested of ACS, but rather demand ischemia related to her respiratory decompensation  - She did have ST segment changes on  her EKG, again likely related to ischemic demand.  I would repeat her EKG today  - Continue to trend cardiac enzymes until they are downtrending  - Continue off of heparin gtt  - Continue aspirin and statin  - Patient has questionable history of HFpEF.  I would continue to hold Lasix  - She is on low dose Levophed, likely related to the high dose sedatives she is needing.  Hopefully this can be titrated to off  - f/u echocardiogram  - Continue mechanical ventilation and wean vent as tolerated.  - monitor and replete lytes, keep K>4, Mg>2  - All other workup per primary team  - Will follow.  Patient is critically ill with a high risk of decompensation.  I have personally provided 35 minutes of critical care time excluding time spent on separate procedures.

## 2019-07-06 NOTE — PROGRESS NOTE ADULT - SUBJECTIVE AND OBJECTIVE BOX
FULL NOTE TO FOLLOW:  - no new events. weaning off pressor support but remains on levo during my bedside eval and requiring propofol for sedation  - still intubated/sedated in MICU  - hypothermic overnight, francoiso dc'd as MRSA PCR is negative. remains on levaquin, f/u cultures. gram positive cocci in clusters on sputum cx, c/w levaquin      ----  Patient is a 85y old  Female who presents with a chief complaint of SOB (06 Jul 2019 08:15)      FROM ADMISSION H+P:   HPI:  84 y/o F with PMHx of Diastolic HF, bronchitis, and chronic LE edema presented to the [facility] on 07/04/19 BIBEMS from [facility] with shortness of breath and cough.  Limited history as patient was unable to remember getting short of breath or getting to the hospital and admits to poor memory. pt states prior to this episode she was in her normal state of health  Per EMS patient received Lasix and Solumedrol.  Patient states she uses an nebulizer for her asthma when needed.  Denies sick contacts.  Denies Chest pain. Denies new exposure to animal.    In the ED, VS: T 98.0, /85, HR 12, RR30, O2 97 on O2.  CBC with WBC 20.  ABG 7.16/75/172/20 which improved with BIPAP repeat ABG 7.41/36/56/23. Trop 0.066. (05 Jul 2019 01:03)      ----  INTERVAL HPI/OVERNIGHT EVENTS: Pt seen and evaluated at the bedside. No acute overnight events occurred.     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  Anxiety disorder  Major depressive disorder  Bronchitis  Cognitive communication deficit  Muscle weakness (generalized)  Insomnia  Hypokalemia  Edema  Essential (primary) hypertension  Unspecified diastolic (congestive) heart failure  No significant past surgical history      FAMILY HISTORY:  No pertinent family history in first degree relatives      Allergies    penicillin (Unknown)    Intolerances        ----  REVIEW OF SYSTEMS:  intubated, unable to respond but pt is following some commands and is responsive    ----  PHYSICAL EXAM:     T(C): 36.5 (07-06-19 @ 08:00), Max: 36.5 (07-06-19 @ 00:11)  HR: 99 (07-06-19 @ 11:30) (51 - 102)  BP: 108/59 (07-06-19 @ 11:30) (64/44 - 155/71)  RR: 23 (07-06-19 @ 11:30) (14 - 45)  SpO2: 100% (07-06-19 @ 11:30) (99% - 100%)  Wt(kg): --    ----  I&O's Summary    05 Jul 2019 07:01  -  06 Jul 2019 07:00  --------------------------------------------------------  IN: 1739.7 mL / OUT: 550 mL / NET: 1189.7 mL    06 Jul 2019 07:01  -  06 Jul 2019 12:41  --------------------------------------------------------  IN: 602 mL / OUT: 0 mL / NET: 602 mL        LABS:                        13.4   18.59 )-----------( 189      ( 06 Jul 2019 07:23 )             40.1     07-06    139  |  102  |  26<H>  ----------------------------<  211<H>  3.3<L>   |  27  |  0.91    Ca    8.0<L>      06 Jul 2019 07:23  Phos  2.4     07-06  Mg     2.0     07-06    TPro  5.9<L>  /  Alb  2.7<L>  /  TBili  0.4  /  DBili  x   /  AST  17  /  ALT  19  /  AlkPhos  88  07-06    PT/INR - ( 05 Jul 2019 06:29 )   PT: 14.5 sec;   INR: 1.27 ratio         PTT - ( 05 Jul 2019 06:29 )  PTT:139.5 sec    CAPILLARY BLOOD GLUCOSE      POCT Blood Glucose.: 162 mg/dL (06 Jul 2019 12:00)  POCT Blood Glucose.: 204 mg/dL (06 Jul 2019 05:48)  POCT Blood Glucose.: 249 mg/dL (05 Jul 2019 23:41)  POCT Blood Glucose.: 262 mg/dL (05 Jul 2019 17:50)    ABG - ( 05 Jul 2019 14:41 )  pH, Arterial: 7.36  pH, Blood: x     /  pCO2: 45    /  pO2: 214   / HCO3: 24    / Base Excess: 0.5   /  SaO2: 100               07-05 @ 09:33   No growth to date.  --  --          Culture - Sputum (collected 07-05-19 @ 18:56)  Source: .Sputum Sputum  Gram Stain (07-06-19 @ 03:06):    Numerous polymorphonuclear leukocytes per low power field    Rare Squamous epithelial cells per low power field    Rare Gram Positive Cocci in Clusters seen per oil power field        ----  Personally reviewed:  Vital sign trends: [  ] yes    [  ] no     [  ] n/a  Laboratory results: [  ] yes    [  ] no     [  ] n/a  Radiology results: [  ] yes    [  ] no     [  ] n/a  Culture results: [  ] yes    [  ] no     [  ] n/a  Consultant recommendations: [  ] yes    [  ] no     [  ] n/a Patient is a 85y old  Female who presents with a chief complaint of SOB (06 Jul 2019 08:15)      FROM ADMISSION H+P:   HPI:  86 y/o F with PMHx of Diastolic HF, bronchitis, and chronic LE edema presented to the [facility] on 07/04/19 PATRICIA from [facility] with shortness of breath and cough.  Limited history as patient was unable to remember getting short of breath or getting to the hospital and admits to poor memory. pt states prior to this episode she was in her normal state of health  Per EMS patient received Lasix and Solumedrol.  Patient states she uses an nebulizer for her asthma when needed.  Denies sick contacts.  Denies Chest pain. Denies new exposure to animal.    In the ED, VS: T 98.0, /85, HR 12, RR30, O2 97 on O2.  CBC with WBC 20.  ABG 7.16/75/172/20 which improved with BIPAP repeat ABG 7.41/36/56/23. Trop 0.066. (05 Jul 2019 01:03)      ----  INTERVAL HPI/OVERNIGHT EVENTS: Pt seen and evaluated at the bedside. No acute overnight events occurred.   - no new events. weaning off pressor support but remains on levo during my bedside eval and requiring propofol for sedation w/ additional agents (fentanyl) as pt was pulling on ETT overnight  - still intubated/sedated in MICU  - tube feed rate @ goal of 50cc/hr, reviewed w/ RN  - hypothermic overnight, vanco dc'd as MRSA PCR is negative. remains on levaquin, f/u cultures. gram positive cocci in clusters on sputum cx, c/w levaquin  - unable to obtain any history as the pt is sedated/intubated but pt is following some commands    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  Anxiety disorder  Major depressive disorder  Bronchitis  Cognitive communication deficit  Muscle weakness (generalized)  Insomnia  Hypokalemia  Edema  Essential (primary) hypertension  Unspecified diastolic (congestive) heart failure  No significant past surgical history      FAMILY HISTORY:  No pertinent family history in first degree relatives      Allergies    penicillin (Unknown)    Intolerances        ----  REVIEW OF SYSTEMS:  intubated, unable to respond but pt is following some commands and is responsive    ----  PHYSICAL EXAM:  GENERAL: patient is sedated, intubated, responsive, squeezes hands upon command  EYES: sclera clear, no exudates, PERRL  ENMT: oropharynx clear without erythema, no exudates, moist mucous membranes, ett in place, ogt in place  LUNGS: coarse lung sounds, reduced air entry, referred vent sounds throughout  HEART: soft S1/S2, regular rate and rhythm, no murmurs noted, no lower extremity edema  GASTROINTESTINAL: abdomen is soft, nontender, nondistended, hypoactive bowel sounds, no palpable masses  INTEGUMENT: good skin turgor, warm skin, appears well perfused  MUSCULOSKELETAL: no clubbing or cyanosis, no obvious deformity  NEUROLOGIC: awake, responsive to verbal stimuli, squeezed hands on command b/l  PSYCHIATRIC: cannot assess yet  HEME/LYMPH: no palpable supraclavicular nodules, no obvious ecchymosis or petechiae      T(C): 36.5 (07-06-19 @ 08:00), Max: 36.5 (07-06-19 @ 00:11)  HR: 99 (07-06-19 @ 11:30) (51 - 102)  BP: 108/59 (07-06-19 @ 11:30) (64/44 - 155/71)  RR: 23 (07-06-19 @ 11:30) (14 - 45)  SpO2: 100% (07-06-19 @ 11:30) (99% - 100%)  Wt(kg): --    ----  I&O's Summary    05 Jul 2019 07:01  -  06 Jul 2019 07:00  --------------------------------------------------------  IN: 1739.7 mL / OUT: 550 mL / NET: 1189.7 mL    06 Jul 2019 07:01  -  06 Jul 2019 12:41  --------------------------------------------------------  IN: 602 mL / OUT: 0 mL / NET: 602 mL        LABS:                        13.4   18.59 )-----------( 189      ( 06 Jul 2019 07:23 )             40.1     07-06    139  |  102  |  26<H>  ----------------------------<  211<H>  3.3<L>   |  27  |  0.91    Ca    8.0<L>      06 Jul 2019 07:23  Phos  2.4     07-06  Mg     2.0     07-06    TPro  5.9<L>  /  Alb  2.7<L>  /  TBili  0.4  /  DBili  x   /  AST  17  /  ALT  19  /  AlkPhos  88  07-06    PT/INR - ( 05 Jul 2019 06:29 )   PT: 14.5 sec;   INR: 1.27 ratio         PTT - ( 05 Jul 2019 06:29 )  PTT:139.5 sec    CAPILLARY BLOOD GLUCOSE      POCT Blood Glucose.: 162 mg/dL (06 Jul 2019 12:00)  POCT Blood Glucose.: 204 mg/dL (06 Jul 2019 05:48)  POCT Blood Glucose.: 249 mg/dL (05 Jul 2019 23:41)  POCT Blood Glucose.: 262 mg/dL (05 Jul 2019 17:50)    ABG - ( 05 Jul 2019 14:41 )  pH, Arterial: 7.36  pH, Blood: x     /  pCO2: 45    /  pO2: 214   / HCO3: 24    / Base Excess: 0.5   /  SaO2: 100               07-05 @ 09:33   No growth to date.  --  --          Culture - Sputum (collected 07-05-19 @ 18:56)  Source: .Sputum Sputum  Gram Stain (07-06-19 @ 03:06):    Numerous polymorphonuclear leukocytes per low power field    Rare Squamous epithelial cells per low power field    Rare Gram Positive Cocci in Clusters seen per oil power field        ----  Personally reviewed:  Vital sign trends: [ x ] yes    [  ] no     [  ] n/a  Laboratory results: [ x ] yes    [  ] no     [  ] n/a - WBC up, K down  Radiology results: [  ] yes    [  ] no     [ x ] n/a  Culture results: [ x ] yes    [  ] no     [  ] n/a  Consultant recommendations: [ x ] yes    [  ] no     [  ] n/a

## 2019-07-06 NOTE — PROGRESS NOTE ADULT - SUBJECTIVE AND OBJECTIVE BOX
24 hour events:   low temp overnight of 94.3      T(F): 97.6 (07-06-19 @ 04:07), Max: 97.7 (07-06-19 @ 00:11)  HR: 75 (07-06-19 @ 08:05) (51 - 104)  BP: 92/60 (07-06-19 @ 08:00) (64/44 - 157/88)  RR: 28 (07-06-19 @ 08:00) (14 - 45)  SpO2: 100% (07-06-19 @ 08:05) (91% - 100%)  Wt(kg): --    Mode: AC/ CMV (Assist Control/ Continuous Mandatory Ventilation), RR (machine): 14, TV (machine): 300, FiO2: 40, PEEP: 5  07-05-19 @ 07:01  -  07-06-19 @ 07:00  --------------------------------------------------------  IN: 1668.7 mL / OUT: 550 mL / NET: 1118.7 mL        CAPILLARY BLOOD GLUCOSE      POCT Blood Glucose.: 204 mg/dL (06 Jul 2019 05:48)      I&O's Summary    05 Jul 2019 07:01  -  06 Jul 2019 07:00  --------------------------------------------------------  IN: 1668.7 mL / OUT: 550 mL / NET: 1118.7 mL        Physical Exam:   Gen:  Neuro:  HEENT:  Resp:  CVS:  Abd:  Ext:  Skin:    Meds:  levoFLOXacin IVPB IV Intermittent  levoFLOXacin IVPB     norepinephrine Infusion IV Continuous    atorvastatin Oral  dextrose 40% Gel Oral PRN  dextrose 50% Injectable IV Push  dextrose 50% Injectable IV Push  dextrose 50% Injectable IV Push  glucagon  Injectable IntraMuscular PRN  insulin lispro (HumaLOG) corrective regimen sliding scale SubCutaneous  methylPREDNISolone sodium succinate Injectable IV Push    ALBUTerol    90 MICROgram(s) HFA Inhaler Inhalation  ALBUTerol    90 MICROgram(s) HFA Inhaler Inhalation PRN    escitalopram Oral  fentaNYL    Injectable IV Push PRN  propofol Infusion IV Continuous      aspirin  chewable Oral  enoxaparin Injectable SubCutaneous        dextrose 5%. IV Continuous  potassium phosphate / sodium phosphate powder Oral      chlorhexidine 0.12% Liquid Oral Mucosa                              13.4   18.59 )-----------( 189      ( 06 Jul 2019 07:23 )             40.1       07-06    139  |  102  |  26<H>  ----------------------------<  211<H>  3.3<L>   |  27  |  0.91    Ca    8.0<L>      06 Jul 2019 07:23  Phos  2.4     07-06  Mg     2.0     07-06    TPro  5.9<L>  /  Alb  2.7<L>  /  TBili  0.4  /  DBili  x   /  AST  17  /  ALT  19  /  AlkPhos  88  07-06      CARDIAC MARKERS ( 05 Jul 2019 12:29 )  1.480 ng/mL / x     / 143 U/L / x     / 9.5 ng/mL  CARDIAC MARKERS ( 05 Jul 2019 06:29 )  1.890 ng/mL / x     / 94 U/L / x     / 7.4 ng/mL  CARDIAC MARKERS ( 05 Jul 2019 00:52 )  .835 ng/mL / x     / 62 U/L / x     / 6.0 ng/mL  CARDIAC MARKERS ( 04 Jul 2019 21:04 )  .066 ng/mL / x     / x     / x     / x          PT/INR - ( 05 Jul 2019 06:29 )   PT: 14.5 sec;   INR: 1.27 ratio         PTT - ( 05 Jul 2019 06:29 )  PTT:139.5 sec    .Sputum Sputum --   Numerous polymorphonuclear leukocytes per low power field  Rare Squamous epithelial cells per low power field  Rare Gram Positive Cocci in Clusters seen per oil power field 07-05 @ 18:56      Rapid RVP Result: Detected (07-05 @ 09:23)          Radiology: ***  Bedside ultrasound: ***    CENTRAL LINE: N/Y          DATE INSERTED:              REMOVE: Y/N  ALEJO: N/Y                       DATE INSERTED:              REMOVE: Y/N  A-LINE: N/Y                       DATE INSERTED:              REMOVE: Y/N    GLOBAL ISSUE/BEST PRACTICE:  Analgesia:  Sedation:  CAM-ICU:   HOB elevation: yes  Stress ulcer prophylaxis:  VTE prophylaxis:  Glycemic control:  Nutrition:    CODE STATUS: *** 24 hour events:   low temp overnight of 94.3      T(F): 97.6 (07-06-19 @ 04:07), Max: 97.7 (07-06-19 @ 00:11)  HR: 75 (07-06-19 @ 08:05) (51 - 104)  BP: 92/60 (07-06-19 @ 08:00) (64/44 - 157/88)  RR: 28 (07-06-19 @ 08:00) (14 - 45)  SpO2: 100% (07-06-19 @ 08:05) (91% - 100%)  Wt(kg): --    Mode: AC/ CMV (Assist Control/ Continuous Mandatory Ventilation), RR (machine): 14, TV (machine): 300, FiO2: 40, PEEP: 5  07-05-19 @ 07:01  -  07-06-19 @ 07:00  --------------------------------------------------------  IN: 1668.7 mL / OUT: 550 mL / NET: 1118.7 mL        CAPILLARY BLOOD GLUCOSE      POCT Blood Glucose.: 204 mg/dL (06 Jul 2019 05:48)      I&O's Summary    05 Jul 2019 07:01  -  06 Jul 2019 07:00  --------------------------------------------------------  IN: 1668.7 mL / OUT: 550 mL / NET: 1118.7 mL        Physical Exam:   Gen: frail and elderly, intubated and sedated  Neuro: follows commands  Resp: markedly diminished expiratory BS, no wheeze  CVS: RRR  Abd: soft, NTND  Ext: no edema  Skin: WWP    Meds:  levoFLOXacin IVPB IV Intermittent  levoFLOXacin IVPB     norepinephrine Infusion IV Continuous    atorvastatin Oral  dextrose 40% Gel Oral PRN  dextrose 50% Injectable IV Push  dextrose 50% Injectable IV Push  dextrose 50% Injectable IV Push  glucagon  Injectable IntraMuscular PRN  insulin lispro (HumaLOG) corrective regimen sliding scale SubCutaneous  methylPREDNISolone sodium succinate Injectable IV Push    ALBUTerol    90 MICROgram(s) HFA Inhaler Inhalation  ALBUTerol    90 MICROgram(s) HFA Inhaler Inhalation PRN    escitalopram Oral  fentaNYL    Injectable IV Push PRN  propofol Infusion IV Continuous      aspirin  chewable Oral  enoxaparin Injectable SubCutaneous        dextrose 5%. IV Continuous  potassium phosphate / sodium phosphate powder Oral      chlorhexidine 0.12% Liquid Oral Mucosa                              13.4   18.59 )-----------( 189      ( 06 Jul 2019 07:23 )             40.1       07-06    139  |  102  |  26<H>  ----------------------------<  211<H>  3.3<L>   |  27  |  0.91    Ca    8.0<L>      06 Jul 2019 07:23  Phos  2.4     07-06  Mg     2.0     07-06    TPro  5.9<L>  /  Alb  2.7<L>  /  TBili  0.4  /  DBili  x   /  AST  17  /  ALT  19  /  AlkPhos  88  07-06      CARDIAC MARKERS ( 05 Jul 2019 12:29 )  1.480 ng/mL / x     / 143 U/L / x     / 9.5 ng/mL  CARDIAC MARKERS ( 05 Jul 2019 06:29 )  1.890 ng/mL / x     / 94 U/L / x     / 7.4 ng/mL  CARDIAC MARKERS ( 05 Jul 2019 00:52 )  .835 ng/mL / x     / 62 U/L / x     / 6.0 ng/mL  CARDIAC MARKERS ( 04 Jul 2019 21:04 )  .066 ng/mL / x     / x     / x     / x          PT/INR - ( 05 Jul 2019 06:29 )   PT: 14.5 sec;   INR: 1.27 ratio         PTT - ( 05 Jul 2019 06:29 )  PTT:139.5 sec    .Sputum Sputum --   Numerous polymorphonuclear leukocytes per low power field  Rare Squamous epithelial cells per low power field  Rare Gram Positive Cocci in Clusters seen per oil power field 07-05 @ 18:56      Rapid RVP Result: Detected (07-05 @ 09:23)          Radiology:   < from: Xray Chest 1 View- PORTABLE-Urgent (07.05.19 @ 14:14) >      INTERPRETATION:  Clinical information: Endotracheal tube placement.    Technique: Frontal view of the chest.    Comparison: Prior chest x-ray examination from 7/5/2019.    Findings: The patient is status post endotracheal tube placement in good   position. There is an NG Tube with its tip projecting below the   diaphragm. The patient is rotated to the left.    The lungs are clear. The heart size is normal. Multilevel degenerative   changes are noted within the imaged potions of the spine.    IMPRESSION: Interval intubation and placement of an NG tube.    No other interval changes.        < end of copied text >      CENTRAL LINE: N  ALEJO: N  A-LINE: N    GLOBAL ISSUE/BEST PRACTICE:  Analgesia: N  Sedation: Y  HOB elevation: yes  Stress ulcer prophylaxis: Y  VTE prophylaxis: Y  Glycemic control: Y  Nutrition: Y    CODE STATUS: FULL

## 2019-07-06 NOTE — CHART NOTE - NSCHARTNOTEFT_GEN_A_CORE
Upon Nutritional Assessment by the Registered Dietitian your patient was determined to meet criteria / has evidence of the following diagnosis/diagnoses:          [ ]  Mild Protein Calorie Malnutrition        [ ]  Moderate Protein Calorie Malnutrition        [x ] Severe Protein Calorie Malnutrition, chronic        [ ] Unspecified Protein Calorie Malnutrition        [ ] Underweight / BMI <19        [ ] Morbid Obesity / BMI > 40      Findings as based on:  •  Comprehensive nutrition assessment and consultation  •  Calorie counts (nutrient intake analysis)  •  Food acceptance and intake status from observations by staff  •  Follow up  •  Patient education  •  Intervention secondary to interdisciplinary rounds  •   concerns  ** severe muscle loss, severe fat loss    Treatment:    The following diet has been recommended:  ** NG feeds, glucerna 1.5, 50ml/hr/24 hours    PROVIDER Section:     By signing this assessment you are acknowledging and agree with the diagnosis/diagnoses assigned by the Registered Dietitian    Comments:

## 2019-07-07 LAB
ALBUMIN SERPL ELPH-MCNC: 2.2 G/DL — LOW (ref 3.3–5)
ALP SERPL-CCNC: 109 U/L — SIGNIFICANT CHANGE UP (ref 40–120)
ALT FLD-CCNC: 42 U/L — SIGNIFICANT CHANGE UP (ref 12–78)
ANION GAP SERPL CALC-SCNC: 6 MMOL/L — SIGNIFICANT CHANGE UP (ref 5–17)
AST SERPL-CCNC: 36 U/L — SIGNIFICANT CHANGE UP (ref 15–37)
BASOPHILS # BLD AUTO: 0.01 K/UL — SIGNIFICANT CHANGE UP (ref 0–0.2)
BASOPHILS NFR BLD AUTO: 0.1 % — SIGNIFICANT CHANGE UP (ref 0–2)
BILIRUB SERPL-MCNC: 0.6 MG/DL — SIGNIFICANT CHANGE UP (ref 0.2–1.2)
BUN SERPL-MCNC: 29 MG/DL — HIGH (ref 7–23)
CALCIUM SERPL-MCNC: 6.7 MG/DL — LOW (ref 8.5–10.1)
CHLORIDE SERPL-SCNC: 103 MMOL/L — SIGNIFICANT CHANGE UP (ref 96–108)
CO2 SERPL-SCNC: 28 MMOL/L — SIGNIFICANT CHANGE UP (ref 22–31)
CREAT SERPL-MCNC: 0.84 MG/DL — SIGNIFICANT CHANGE UP (ref 0.5–1.3)
CULTURE RESULTS: SIGNIFICANT CHANGE UP
EOSINOPHIL # BLD AUTO: 0 K/UL — SIGNIFICANT CHANGE UP (ref 0–0.5)
EOSINOPHIL NFR BLD AUTO: 0 % — SIGNIFICANT CHANGE UP (ref 0–6)
GLUCOSE SERPL-MCNC: 152 MG/DL — HIGH (ref 70–99)
HCT VFR BLD CALC: 34.9 % — SIGNIFICANT CHANGE UP (ref 34.5–45)
HGB BLD-MCNC: 12.3 G/DL — SIGNIFICANT CHANGE UP (ref 11.5–15.5)
IMM GRANULOCYTES NFR BLD AUTO: 0.4 % — SIGNIFICANT CHANGE UP (ref 0–1.5)
LYMPHOCYTES # BLD AUTO: 0.62 K/UL — LOW (ref 1–3.3)
LYMPHOCYTES # BLD AUTO: 6.3 % — LOW (ref 13–44)
MAGNESIUM SERPL-MCNC: 2 MG/DL — SIGNIFICANT CHANGE UP (ref 1.6–2.6)
MCHC RBC-ENTMCNC: 31.5 PG — SIGNIFICANT CHANGE UP (ref 27–34)
MCHC RBC-ENTMCNC: 35.2 GM/DL — SIGNIFICANT CHANGE UP (ref 32–36)
MCV RBC AUTO: 89.3 FL — SIGNIFICANT CHANGE UP (ref 80–100)
MONOCYTES # BLD AUTO: 0.86 K/UL — SIGNIFICANT CHANGE UP (ref 0–0.9)
MONOCYTES NFR BLD AUTO: 8.8 % — SIGNIFICANT CHANGE UP (ref 2–14)
NEUTROPHILS # BLD AUTO: 8.28 K/UL — HIGH (ref 1.8–7.4)
NEUTROPHILS NFR BLD AUTO: 84.4 % — HIGH (ref 43–77)
NRBC # BLD: 0 /100 WBCS — SIGNIFICANT CHANGE UP (ref 0–0)
PHOSPHATE SERPL-MCNC: 1.5 MG/DL — LOW (ref 2.5–4.5)
PLATELET # BLD AUTO: 116 K/UL — LOW (ref 150–400)
POTASSIUM SERPL-MCNC: 4.7 MMOL/L — SIGNIFICANT CHANGE UP (ref 3.5–5.3)
POTASSIUM SERPL-SCNC: 4.7 MMOL/L — SIGNIFICANT CHANGE UP (ref 3.5–5.3)
PROT SERPL-MCNC: 5 G/DL — LOW (ref 6–8.3)
RBC # BLD: 3.91 M/UL — SIGNIFICANT CHANGE UP (ref 3.8–5.2)
RBC # FLD: 13.1 % — SIGNIFICANT CHANGE UP (ref 10.3–14.5)
S PNEUM AG UR QL: NEGATIVE — SIGNIFICANT CHANGE UP
SODIUM SERPL-SCNC: 137 MMOL/L — SIGNIFICANT CHANGE UP (ref 135–145)
SPECIMEN SOURCE: SIGNIFICANT CHANGE UP
WBC # BLD: 9.81 K/UL — SIGNIFICANT CHANGE UP (ref 3.8–10.5)
WBC # FLD AUTO: 9.81 K/UL — SIGNIFICANT CHANGE UP (ref 3.8–10.5)

## 2019-07-07 PROCEDURE — 99291 CRITICAL CARE FIRST HOUR: CPT

## 2019-07-07 PROCEDURE — 99233 SBSQ HOSP IP/OBS HIGH 50: CPT

## 2019-07-07 RX ORDER — INSULIN LISPRO 100/ML
VIAL (ML) SUBCUTANEOUS
Refills: 0 | Status: DISCONTINUED | OUTPATIENT
Start: 2019-07-07 | End: 2019-07-08

## 2019-07-07 RX ORDER — IPRATROPIUM/ALBUTEROL SULFATE 18-103MCG
3 AEROSOL WITH ADAPTER (GRAM) INHALATION EVERY 6 HOURS
Refills: 0 | Status: DISCONTINUED | OUTPATIENT
Start: 2019-07-07 | End: 2019-07-10

## 2019-07-07 RX ORDER — OXYCODONE HYDROCHLORIDE 5 MG/1
5 TABLET ORAL ONCE
Refills: 0 | Status: DISCONTINUED | OUTPATIENT
Start: 2019-07-07 | End: 2019-07-07

## 2019-07-07 RX ORDER — DEXMEDETOMIDINE HYDROCHLORIDE IN 0.9% SODIUM CHLORIDE 4 UG/ML
0.5 INJECTION INTRAVENOUS
Qty: 200 | Refills: 0 | Status: DISCONTINUED | OUTPATIENT
Start: 2019-07-07 | End: 2019-07-07

## 2019-07-07 RX ADMIN — Medication 1: at 12:15

## 2019-07-07 RX ADMIN — Medication 3 MILLILITER(S): at 14:01

## 2019-07-07 RX ADMIN — ALBUTEROL 4 PUFF(S): 90 AEROSOL, METERED ORAL at 07:19

## 2019-07-07 RX ADMIN — Medication 40 MILLIGRAM(S): at 06:19

## 2019-07-07 RX ADMIN — CHLORHEXIDINE GLUCONATE 15 MILLILITER(S): 213 SOLUTION TOPICAL at 06:17

## 2019-07-07 RX ADMIN — Medication 3 MILLILITER(S): at 19:57

## 2019-07-07 RX ADMIN — Medication 40 MILLIGRAM(S): at 17:57

## 2019-07-07 RX ADMIN — Medication 4 PUFF(S): at 01:52

## 2019-07-07 RX ADMIN — Medication 85 MILLIMOLE(S): at 11:07

## 2019-07-07 RX ADMIN — Medication 4 PUFF(S): at 07:19

## 2019-07-07 RX ADMIN — ATORVASTATIN CALCIUM 80 MILLIGRAM(S): 80 TABLET, FILM COATED ORAL at 22:34

## 2019-07-07 RX ADMIN — DEXMEDETOMIDINE HYDROCHLORIDE IN 0.9% SODIUM CHLORIDE 8.5 MICROGRAM(S)/KG/HR: 4 INJECTION INTRAVENOUS at 07:05

## 2019-07-07 RX ADMIN — ESCITALOPRAM OXALATE 10 MILLIGRAM(S): 10 TABLET, FILM COATED ORAL at 12:20

## 2019-07-07 RX ADMIN — Medication 81 MILLIGRAM(S): at 12:16

## 2019-07-07 RX ADMIN — PROPOFOL 8.16 MICROGRAM(S)/KG/MIN: 10 INJECTION, EMULSION INTRAVENOUS at 01:20

## 2019-07-07 RX ADMIN — ALBUTEROL 4 PUFF(S): 90 AEROSOL, METERED ORAL at 01:51

## 2019-07-07 RX ADMIN — ENOXAPARIN SODIUM 40 MILLIGRAM(S): 100 INJECTION SUBCUTANEOUS at 12:16

## 2019-07-07 RX ADMIN — FAMOTIDINE 20 MILLIGRAM(S): 10 INJECTION INTRAVENOUS at 12:16

## 2019-07-07 NOTE — PROGRESS NOTE ADULT - SUBJECTIVE AND OBJECTIVE BOX
Patient is a 85y old  Female who presents with a chief complaint of SOB (06 Jul 2019 08:15)      FROM ADMISSION H+P:   HPI:  84 y/o F with PMHx of Diastolic HF, bronchitis, and chronic LE edema presented to the [facility] on 07/04/19 PATRICIA from [facility] with shortness of breath and cough.  Limited history as patient was unable to remember getting short of breath or getting to the hospital and admits to poor memory. pt states prior to this episode she was in her normal state of health  Per EMS patient received Lasix and Solumedrol.  Patient states she uses an nebulizer for her asthma when needed.  Denies sick contacts.  Denies Chest pain. Denies new exposure to animal.    In the ED, VS: T 98.0, /85, HR 12, RR30, O2 97 on O2.  CBC with WBC 20.  ABG 7.16/75/172/20 which improved with BIPAP repeat ABG 7.41/36/56/23. Trop 0.066. (05 Jul 2019 01:03)      ----  INTERVAL HPI/OVERNIGHT EVENTS: Pt seen and evaluated at the bedside. Extubated this morning. Still complains of weakness and SOB, but overall improved from when admitted.     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  Anxiety disorder  Major depressive disorder  Bronchitis  Cognitive communication deficit  Muscle weakness (generalized)  Insomnia  Hypokalemia  Edema  Essential (primary) hypertension  Unspecified diastolic (congestive) heart failure  No significant past surgical history      FAMILY HISTORY:  No pertinent family history in first degree relatives      Allergies    penicillin (Unknown)    Intolerances      Vital Signs Last 24 Hrs  T(C): 36.7 (07 Jul 2019 17:30), Max: 36.7 (07 Jul 2019 01:00)  T(F): 98.1 (07 Jul 2019 17:30), Max: 98.1 (07 Jul 2019 01:00)  HR: 86 (07 Jul 2019 19:30) (56 - 109)  BP: 164/71 (07 Jul 2019 19:30) (81/49 - 173/88)  BP(mean): 98 (07 Jul 2019 19:30) (61 - 122)  RR: 32 (07 Jul 2019 19:30) (15 - 40)  SpO2: 100% (07 Jul 2019 19:30) (86% - 100%)    General: WN/WD NAD  Neurology: alert and awake, nonfocal, DRUMMOND x 4  Respiratory: coarse bs   CV: RRR, S1S2, no murmurs, rubs or gallops  Abdominal: Soft, NT, ND +BS, Last BM  Extremities: No edema, + peripheral pulses    LABS:                        12.3   9.81  )-----------( 116      ( 07 Jul 2019 07:25 )             34.9     07-07    137  |  103  |  29<H>  ----------------------------<  152<H>  4.7   |  28  |  0.84    Ca    6.7<L>      07 Jul 2019 07:25  Phos  1.5     07-07  Mg     2.0     07-07    TPro  5.0<L>  /  Alb  2.2<L>  /  TBili  0.6  /  DBili  x   /  AST  36  /  ALT  42  /  AlkPhos  109  07-07          ABG - ( 05 Jul 2019 14:41 )  pH, Arterial: 7.36  pH, Blood: x     /  pCO2: 45    /  pO2: 214   / HCO3: 24    / Base Excess: 0.5   /  SaO2: 100               07-05 @ 09:33   No growth to date.  --  --          Culture - Sputum (collected 07-05-19 @ 18:56)  Source: .Sputum Sputum  Gram Stain (07-06-19 @ 03:06):    Numerous polymorphonuclear leukocytes per low power field    Rare Squamous epithelial cells per low power field    Rare Gram Positive Cocci in Clusters seen per oil power field        ----  Personally reviewed:  Vital sign trends: [ x ] yes    [  ] no     [  ] n/a  Laboratory results: [ x ] yes    [  ] no     [  ] n/a - WBC up, K down  Radiology results: [  ] yes    [  ] no     [ x ] n/a  Culture results: [ x ] yes    [  ] no     [  ] n/a  Consultant recommendations: [ x ] yes    [  ] no     [  ] n/a

## 2019-07-07 NOTE — PROGRESS NOTE ADULT - ASSESSMENT
84 y/o F with PMHx of Diastolic HF, bronchitis, and chronic LE edema presenting from [facility] with shortness of breath and cough, admitted for acute hypoxic respiratory failure with hypercapnia.

## 2019-07-07 NOTE — PROGRESS NOTE ADULT - SUBJECTIVE AND OBJECTIVE BOX
24 hour events:   yesterday evening tolerated PS 5/5 x 2hrs  levophed weaned off  propofol changed to precedex to facilitate weaning this am    T(F): 98 (07-07-19 @ 04:00), Max: 98.2 (07-06-19 @ 16:30)  HR: 61 (07-07-19 @ 08:00) (60 - 112)  BP: 81/49 (07-07-19 @ 08:00) (81/49 - 130/60)  RR: 15 (07-07-19 @ 08:00) (15 - 40)  SpO2: 100% (07-07-19 @ 08:00) (95% - 100%)  Wt(kg): --    Mode: CPAP with PS, FiO2: 40, PEEP: 5, PS: 5  07-06-19 @ 07:01  -  07-07-19 @ 07:00  --------------------------------------------------------  IN: 2228 mL / OUT: 700 mL / NET: 1528 mL    07-07-19 @ 07:01  -  07-07-19 @ 08:46  --------------------------------------------------------  IN: 61 mL / OUT: 0 mL / NET: 61 mL        CAPILLARY BLOOD GLUCOSE      POCT Blood Glucose.: 129 mg/dL (07 Jul 2019 06:10)      I&O's Summary    06 Jul 2019 07:01  -  07 Jul 2019 07:00  --------------------------------------------------------  IN: 2228 mL / OUT: 700 mL / NET: 1528 mL    07 Jul 2019 07:01  -  07 Jul 2019 08:46  --------------------------------------------------------  IN: 61 mL / OUT: 0 mL / NET: 61 mL        Physical Exam:   Gen:  Neuro:  HEENT:  Resp:  CVS:  Abd:  Ext:  Skin:    Meds:  levoFLOXacin IVPB IV Intermittent  levoFLOXacin IVPB     norepinephrine Infusion IV Continuous    atorvastatin Oral  dextrose 40% Gel Oral PRN  dextrose 50% Injectable IV Push  dextrose 50% Injectable IV Push  dextrose 50% Injectable IV Push  glucagon  Injectable IntraMuscular PRN  insulin lispro (HumaLOG) corrective regimen sliding scale SubCutaneous  methylPREDNISolone sodium succinate Injectable IV Push    ALBUTerol    90 MICROgram(s) HFA Inhaler Inhalation PRN  ALBUTerol    90 MICROgram(s) HFA Inhaler Inhalation  ipratropium 17 MICROgram(s) HFA Inhaler Inhalation    dexmedetomidine Infusion IV Continuous  escitalopram Oral  fentaNYL    Injectable IV Push PRN  propofol Infusion IV Continuous      aspirin  chewable Oral  enoxaparin Injectable SubCutaneous    famotidine    Tablet Oral      dextrose 5%. IV Continuous      chlorhexidine 0.12% Liquid Oral Mucosa                              12.3   9.81  )-----------( 116      ( 07 Jul 2019 07:25 )             34.9       07-07    137  |  103  |  x   ----------------------------<  152<H>  4.7   |  28  |  0.84    Ca    6.7<L>      07 Jul 2019 07:25  Phos  1.5     07-07  Mg     2.0     07-07    TPro  5.0<L>  /  Alb  2.2<L>  /  TBili  0.6  /  DBili  x   /  AST  36  /  ALT  42  /  AlkPhos  109  07-07      CARDIAC MARKERS ( 05 Jul 2019 12:29 )  1.480 ng/mL / x     / 143 U/L / x     / 9.5 ng/mL          .Sputum Sputum   Normal Respiratory Laine present   Numerous polymorphonuclear leukocytes per low power field  Rare Squamous epithelial cells per low power field  Rare Gram Positive Cocci in Clusters seen per oil power field 07-05 @ 18:56  .Blood Blood   No growth to date. -- 07-05 @ 09:33      Rapid RVP Result: Detected (07-05 @ 09:23)          Radiology: ***  Bedside ultrasound: ***    CENTRAL LINE: N/Y          DATE INSERTED:              REMOVE: Y/N  ALEJO: N/Y                       DATE INSERTED:              REMOVE: Y/N  A-LINE: N/Y                       DATE INSERTED:              REMOVE: Y/N    GLOBAL ISSUE/BEST PRACTICE:  Analgesia:  Sedation:  CAM-ICU:   HOB elevation: yes  Stress ulcer prophylaxis:  VTE prophylaxis:  Glycemic control:  Nutrition:    CODE STATUS: *** 24 hour events:   yesterday evening tolerated PS 5/5 x 2hrs  levophed weaned off  propofol changed to precedex to facilitate weaning this am    T(F): 98 (07-07-19 @ 04:00), Max: 98.2 (07-06-19 @ 16:30)  HR: 61 (07-07-19 @ 08:00) (60 - 112)  BP: 81/49 (07-07-19 @ 08:00) (81/49 - 130/60)  RR: 15 (07-07-19 @ 08:00) (15 - 40)  SpO2: 100% (07-07-19 @ 08:00) (95% - 100%)  Wt(kg): --    Mode: CPAP with PS, FiO2: 40, PEEP: 5, PS: 5  07-06-19 @ 07:01  -  07-07-19 @ 07:00  --------------------------------------------------------  IN: 2228 mL / OUT: 700 mL / NET: 1528 mL    07-07-19 @ 07:01  -  07-07-19 @ 08:46  --------------------------------------------------------  IN: 61 mL / OUT: 0 mL / NET: 61 mL        CAPILLARY BLOOD GLUCOSE      POCT Blood Glucose.: 129 mg/dL (07 Jul 2019 06:10)      I&O's Summary    06 Jul 2019 07:01  -  07 Jul 2019 07:00  --------------------------------------------------------  IN: 2228 mL / OUT: 700 mL / NET: 1528 mL    07 Jul 2019 07:01  -  07 Jul 2019 08:46  --------------------------------------------------------  IN: 61 mL / OUT: 0 mL / NET: 61 mL        Physical Exam:   Gen: alert, comfortable on weaning trial  Neuro: alert  Resp: faint expiratory wheeze, excellent air movement  CVS: RRR  Abd: soft, NTND  Ext: no edema  Skin: WWP    Meds:  levoFLOXacin IVPB IV Intermittent  levoFLOXacin IVPB     norepinephrine Infusion IV Continuous    atorvastatin Oral  dextrose 40% Gel Oral PRN  dextrose 50% Injectable IV Push  dextrose 50% Injectable IV Push  dextrose 50% Injectable IV Push  glucagon  Injectable IntraMuscular PRN  insulin lispro (HumaLOG) corrective regimen sliding scale SubCutaneous  methylPREDNISolone sodium succinate Injectable IV Push    ALBUTerol    90 MICROgram(s) HFA Inhaler Inhalation PRN  ALBUTerol    90 MICROgram(s) HFA Inhaler Inhalation  ipratropium 17 MICROgram(s) HFA Inhaler Inhalation    dexmedetomidine Infusion IV Continuous  escitalopram Oral  fentaNYL    Injectable IV Push PRN  propofol Infusion IV Continuous      aspirin  chewable Oral  enoxaparin Injectable SubCutaneous    famotidine    Tablet Oral      dextrose 5%. IV Continuous      chlorhexidine 0.12% Liquid Oral Mucosa                              12.3   9.81  )-----------( 116      ( 07 Jul 2019 07:25 )             34.9       07-07    137  |  103  |  x   ----------------------------<  152<H>  4.7   |  28  |  0.84    Ca    6.7<L>      07 Jul 2019 07:25  Phos  1.5     07-07  Mg     2.0     07-07    TPro  5.0<L>  /  Alb  2.2<L>  /  TBili  0.6  /  DBili  x   /  AST  36  /  ALT  42  /  AlkPhos  109  07-07      CARDIAC MARKERS ( 05 Jul 2019 12:29 )  1.480 ng/mL / x     / 143 U/L / x     / 9.5 ng/mL          .Sputum Sputum   Normal Respiratory Laine present   Numerous polymorphonuclear leukocytes per low power field  Rare Squamous epithelial cells per low power field  Rare Gram Positive Cocci in Clusters seen per oil power field 07-05 @ 18:56  .Blood Blood   No growth to date. -- 07-05 @ 09:33      Rapid RVP Result: Detected (07-05 @ 09:23)        Radiology: ***  < from: TTE Echo Doppler w/o Cont (07.06.19 @ 14:24) >  CONCLUSIONS:  Technically difficult study.  1. The endocardium is not well visualized. Grossly, normal left   ventricular systolic function.  2.Calcified trileaflet aortic valve. Minimal aortic insufficiency. Peak   transaortic gradient equals 16 mmHg, and mean transaortic gradient equals   9 mm Hg, consistent with mild aortic stenosis.  3. Mitral annular calcification and calcified mitral valve leaflets with   mild mitral insufficiency.  4. Normal left atrium.  5. Right ventricular enlargement with borderline right ventricular   systolic function. Mild right atrial enlargement.  6. Grade 1 diastolic dysfunction.    < end of copied text >    CENTRAL LINE: N  ALEJO: N  A-LINE: N    GLOBAL ISSUE/BEST PRACTICE:  Analgesia: Y  Sedation: Y  HOB elevation: yes  Stress ulcer prophylaxis: Y  VTE prophylaxis: Y  Glycemic control: Y  Nutrition: Y    CODE STATUS: FULL

## 2019-07-07 NOTE — PROGRESS NOTE ADULT - ASSESSMENT
86 y/o F with PMHx of Diastolic HF, bronchitis, and chronic LE edema presenting from [facility] with shortness of breath and cough, admitted for acute hypoxic respiratory failure with hypercapnia, status asthmaticus, enterorhinovirus, intubated, positive troponin      - Troponin levels seem to have plateaued.  The trend, along with the CK, is not suggested of ACS, but rather demand ischemia related to her respiratory decompensation  - She did have ST segment changes on  her EKG, again likely related to ischemic demand.  I would repeat her EKG today  - No need to trend enzymes any longer  - Continue off of heparin gtt  - Continue aspirin and statin  - Patient has questionable history of HFpEF.  I would continue to hold Lasix  - Levophed has been titrated to off  - f/u echocardiogram  - Continue mechanical ventilation and wean vent as tolerated.  - monitor and replete lytes, keep K>4, Mg>2  - All other workup per primary team  - Will follow.  Patient is critically ill with a high risk of decompensation.  I have personally provided 35 minutes of critical care time excluding time spent on separate procedures.

## 2019-07-07 NOTE — CHART NOTE - NSCHARTNOTEFT_GEN_A_CORE
Called to bedside for diet order. Pt extubated 7/7, was on tube feeds while intubated. Per discussion with pt she normally "has to chew her food a lot" and avoids sugars but without other restrictions. Will start pt on po diet, soft.

## 2019-07-07 NOTE — PROGRESS NOTE ADULT - SUBJECTIVE AND OBJECTIVE BOX
[facility] - Modesto Moreno, Kirill, Marco A, Paul, July Navarro  Office Number:  [phone]    Patient resting comfortably in bed in NAD.  Laying flat with no respiratory distress.  Remains intubated.  Off of IV pressor support.    F/U for:  Acute respiratory failure    Telemetry:  NSR    MEDICATIONS  (STANDING):  ALBUTerol    90 MICROgram(s) HFA Inhaler 4 Puff(s) Inhalation every 6 hours  aspirin  chewable 81 milliGRAM(s) Oral daily  atorvastatin 80 milliGRAM(s) Oral at bedtime  chlorhexidine 0.12% Liquid 15 milliLiter(s) Oral Mucosa two times a day  dexmedetomidine Infusion 0.5 MICROgram(s)/kG/Hr (8.5 mL/Hr) IV Continuous <Continuous>  dextrose 5%. 1000 milliLiter(s) (50 mL/Hr) IV Continuous <Continuous>  dextrose 50% Injectable 12.5 Gram(s) IV Push once  dextrose 50% Injectable 25 Gram(s) IV Push once  dextrose 50% Injectable 25 Gram(s) IV Push once  enoxaparin Injectable 40 milliGRAM(s) SubCutaneous daily  escitalopram 10 milliGRAM(s) Oral daily  famotidine    Tablet 20 milliGRAM(s) Oral daily  insulin lispro (HumaLOG) corrective regimen sliding scale   SubCutaneous every 6 hours  ipratropium 17 MICROgram(s) HFA Inhaler 4 Puff(s) Inhalation every 6 hours  levoFLOXacin IVPB 750 milliGRAM(s) IV Intermittent every 24 hours  levoFLOXacin IVPB      methylPREDNISolone sodium succinate Injectable 40 milliGRAM(s) IV Push every 12 hours  norepinephrine Infusion 0.05 MICROgram(s)/kG/Min (6.375 mL/Hr) IV Continuous <Continuous>  propofol Infusion 20 MICROgram(s)/kG/Min (8.16 mL/Hr) IV Continuous <Continuous>    MEDICATIONS  (PRN):  ALBUTerol    90 MICROgram(s) HFA Inhaler 2 Puff(s) Inhalation every 2 hours PRN Shortness of Breath and/or Wheezing  dextrose 40% Gel 15 Gram(s) Oral once PRN Blood Glucose LESS THAN 70 milliGRAM(s)/deciliter  fentaNYL    Injectable 50 MICROGram(s) IV Push every 2 hours PRN sedation  glucagon  Injectable 1 milliGRAM(s) IntraMuscular once PRN Glucose LESS THAN 70 milligrams/deciliter      Allergies    penicillin (Unknown)        Vital Signs Last 24 Hrs  T(C): 36.7 (07 Jul 2019 04:00), Max: 36.8 (06 Jul 2019 16:30)  T(F): 98 (07 Jul 2019 04:00), Max: 98.2 (06 Jul 2019 16:30)  HR: 109 (07 Jul 2019 07:00) (64 - 112)  BP: 127/61 (07 Jul 2019 06:30) (92/60 - 130/60)  BP(mean): 86 (07 Jul 2019 06:30) (68 - 90)  RR: 32 (07 Jul 2019 07:00) (16 - 40)  SpO2: 100% (07 Jul 2019 07:00) (100% - 100%)    I&O's Summary    06 Jul 2019 07:01  -  07 Jul 2019 07:00  --------------------------------------------------------  IN: 2228 mL / OUT: 700 mL / NET: 1528 mL        ON EXAM:    General: NAD, intubted, sedated  HEENT: Mucous membranes are Dry.  ETT and OGT in place  Lungs: Non-labored, breath sounds are clear bilaterally.  b/l rhonchi  Cardiovascular: Regular, S1 and S2, no murmurs, rubs, or gallops  Gastrointestinal: Bowel Sounds present, soft, nontender.   Lymph: b/l peripheral edema. No lymphadenopathy.  Skin: No rashes or ulcers  Psych:  Unable to assess    LABS: All Labs Reviewed:                        13.4   18.59 )-----------( 189      ( 06 Jul 2019 07:23 )             40.1                         14.8   11.69 )-----------( 135      ( 05 Jul 2019 06:29 )             44.2                         14.9   20.51 )-----------( 193      ( 04 Jul 2019 21:04 )             45.8     06 Jul 2019 07:23    139    |  102    |  26     ----------------------------<  211    3.3     |  27     |  0.91   05 Jul 2019 06:29    139    |  99     |  25     ----------------------------<  234    3.6     |  32     |  0.97   04 Jul 2019 21:04    138    |  104    |  26     ----------------------------<  250    3.9     |  27     |  0.77     Ca    8.0        06 Jul 2019 07:23  Ca    8.5        05 Jul 2019 06:29  Ca    8.4        04 Jul 2019 21:04  Phos  2.4       06 Jul 2019 07:23  Phos  2.5       05 Jul 2019 06:29  Mg     2.0       06 Jul 2019 07:23  Mg     2.0       05 Jul 2019 06:29  Mg     2.1       04 Jul 2019 21:04    TPro  5.9    /  Alb  2.7    /  TBili  0.4    /  DBili  x      /  AST  17     /  ALT  19     /  AlkPhos  88     06 Jul 2019 07:23  TPro  6.6    /  Alb  3.5    /  TBili  0.7    /  DBili  x      /  AST  16     /  ALT  20     /  AlkPhos  121    04 Jul 2019 21:04      CARDIAC MARKERS ( 05 Jul 2019 12:29 )  1.480 ng/mL / x     / 143 U/L / x     / 9.5 ng/mL      Blood Culture: Organism --  Gram Stain Blood -- Gram Stain   Numerous polymorphonuclear leukocytes per low power field  Rare Squamous epithelial cells per low power field  Rare Gram Positive Cocci in Clusters seen per oil power field  Specimen Source .Sputum Sputum  Culture-Blood --    Organism --  Gram Stain Blood -- Gram Stain --  Specimen Source .Blood Blood  Culture-Blood --      07-04 @ 21:04  Pro Bnp 511

## 2019-07-08 LAB
ALBUMIN SERPL ELPH-MCNC: 2.4 G/DL — LOW (ref 3.3–5)
ALP SERPL-CCNC: 80 U/L — SIGNIFICANT CHANGE UP (ref 40–120)
ALT FLD-CCNC: 27 U/L — SIGNIFICANT CHANGE UP (ref 12–78)
ANION GAP SERPL CALC-SCNC: 3 MMOL/L — LOW (ref 5–17)
AST SERPL-CCNC: 20 U/L — SIGNIFICANT CHANGE UP (ref 15–37)
BASOPHILS # BLD AUTO: 0.01 K/UL — SIGNIFICANT CHANGE UP (ref 0–0.2)
BASOPHILS NFR BLD AUTO: 0.1 % — SIGNIFICANT CHANGE UP (ref 0–2)
BILIRUB SERPL-MCNC: 0.3 MG/DL — SIGNIFICANT CHANGE UP (ref 0.2–1.2)
BUN SERPL-MCNC: 25 MG/DL — HIGH (ref 7–23)
CALCIUM SERPL-MCNC: 7.9 MG/DL — LOW (ref 8.5–10.1)
CHLORIDE SERPL-SCNC: 106 MMOL/L — SIGNIFICANT CHANGE UP (ref 96–108)
CO2 SERPL-SCNC: 34 MMOL/L — HIGH (ref 22–31)
CREAT SERPL-MCNC: 0.84 MG/DL — SIGNIFICANT CHANGE UP (ref 0.5–1.3)
EOSINOPHIL # BLD AUTO: 0 K/UL — SIGNIFICANT CHANGE UP (ref 0–0.5)
EOSINOPHIL NFR BLD AUTO: 0 % — SIGNIFICANT CHANGE UP (ref 0–6)
GLUCOSE SERPL-MCNC: 122 MG/DL — HIGH (ref 70–99)
HCT VFR BLD CALC: 37.8 % — SIGNIFICANT CHANGE UP (ref 34.5–45)
HGB BLD-MCNC: 12.4 G/DL — SIGNIFICANT CHANGE UP (ref 11.5–15.5)
IMM GRANULOCYTES NFR BLD AUTO: 0.5 % — SIGNIFICANT CHANGE UP (ref 0–1.5)
LYMPHOCYTES # BLD AUTO: 1.04 K/UL — SIGNIFICANT CHANGE UP (ref 1–3.3)
LYMPHOCYTES # BLD AUTO: 12.1 % — LOW (ref 13–44)
MAGNESIUM SERPL-MCNC: 2.4 MG/DL — SIGNIFICANT CHANGE UP (ref 1.6–2.6)
MCHC RBC-ENTMCNC: 29.5 PG — SIGNIFICANT CHANGE UP (ref 27–34)
MCHC RBC-ENTMCNC: 32.8 GM/DL — SIGNIFICANT CHANGE UP (ref 32–36)
MCV RBC AUTO: 90 FL — SIGNIFICANT CHANGE UP (ref 80–100)
MONOCYTES # BLD AUTO: 0.63 K/UL — SIGNIFICANT CHANGE UP (ref 0–0.9)
MONOCYTES NFR BLD AUTO: 7.3 % — SIGNIFICANT CHANGE UP (ref 2–14)
NEUTROPHILS # BLD AUTO: 6.86 K/UL — SIGNIFICANT CHANGE UP (ref 1.8–7.4)
NEUTROPHILS NFR BLD AUTO: 80 % — HIGH (ref 43–77)
NRBC # BLD: 0 /100 WBCS — SIGNIFICANT CHANGE UP (ref 0–0)
PHOSPHATE SERPL-MCNC: 3.4 MG/DL — SIGNIFICANT CHANGE UP (ref 2.5–4.5)
PLATELET # BLD AUTO: 118 K/UL — LOW (ref 150–400)
POTASSIUM SERPL-MCNC: 5.1 MMOL/L — SIGNIFICANT CHANGE UP (ref 3.5–5.3)
POTASSIUM SERPL-SCNC: 5.1 MMOL/L — SIGNIFICANT CHANGE UP (ref 3.5–5.3)
PROT SERPL-MCNC: 5.2 G/DL — LOW (ref 6–8.3)
RBC # BLD: 4.2 M/UL — SIGNIFICANT CHANGE UP (ref 3.8–5.2)
RBC # FLD: 13.4 % — SIGNIFICANT CHANGE UP (ref 10.3–14.5)
SODIUM SERPL-SCNC: 143 MMOL/L — SIGNIFICANT CHANGE UP (ref 135–145)
WBC # BLD: 8.58 K/UL — SIGNIFICANT CHANGE UP (ref 3.8–10.5)
WBC # FLD AUTO: 8.58 K/UL — SIGNIFICANT CHANGE UP (ref 3.8–10.5)

## 2019-07-08 PROCEDURE — 99233 SBSQ HOSP IP/OBS HIGH 50: CPT

## 2019-07-08 PROCEDURE — 99233 SBSQ HOSP IP/OBS HIGH 50: CPT | Mod: GC

## 2019-07-08 PROCEDURE — 99291 CRITICAL CARE FIRST HOUR: CPT

## 2019-07-08 RX ORDER — BUDESONIDE AND FORMOTEROL FUMARATE DIHYDRATE 160; 4.5 UG/1; UG/1
2 AEROSOL RESPIRATORY (INHALATION)
Refills: 0 | Status: DISCONTINUED | OUTPATIENT
Start: 2019-07-08 | End: 2019-07-10

## 2019-07-08 RX ADMIN — Medication 3 MILLILITER(S): at 20:43

## 2019-07-08 RX ADMIN — Medication 81 MILLIGRAM(S): at 12:43

## 2019-07-08 RX ADMIN — ESCITALOPRAM OXALATE 10 MILLIGRAM(S): 10 TABLET, FILM COATED ORAL at 12:43

## 2019-07-08 RX ADMIN — Medication 3 MILLILITER(S): at 07:36

## 2019-07-08 RX ADMIN — Medication 3 MILLILITER(S): at 02:24

## 2019-07-08 RX ADMIN — ENOXAPARIN SODIUM 40 MILLIGRAM(S): 100 INJECTION SUBCUTANEOUS at 12:43

## 2019-07-08 RX ADMIN — FAMOTIDINE 20 MILLIGRAM(S): 10 INJECTION INTRAVENOUS at 12:42

## 2019-07-08 RX ADMIN — Medication 40 MILLIGRAM(S): at 06:20

## 2019-07-08 RX ADMIN — BUDESONIDE AND FORMOTEROL FUMARATE DIHYDRATE 2 PUFF(S): 160; 4.5 AEROSOL RESPIRATORY (INHALATION) at 18:22

## 2019-07-08 RX ADMIN — Medication 3 MILLILITER(S): at 14:28

## 2019-07-08 RX ADMIN — ATORVASTATIN CALCIUM 80 MILLIGRAM(S): 80 TABLET, FILM COATED ORAL at 21:37

## 2019-07-08 NOTE — PROGRESS NOTE ADULT - SUBJECTIVE AND OBJECTIVE BOX
[facility] - Modesto Moreno, Kirill, Marco A, Paul, July Navarro  Office Number:  [phone]    Patient resting comfortably in bed in NAD.  Coughing this morning and winded after moving.  no chest pain or palpitations.    ROS: negative unless otherwise mentioned.    Telemetry:  sr/st    MEDICATIONS  (STANDING):  ALBUTerol/ipratropium for Nebulization. 3 milliLiter(s) Nebulizer every 6 hours  aspirin  chewable 81 milliGRAM(s) Oral daily  atorvastatin 80 milliGRAM(s) Oral at bedtime  dextrose 5%. 1000 milliLiter(s) (50 mL/Hr) IV Continuous <Continuous>  dextrose 50% Injectable 12.5 Gram(s) IV Push once  dextrose 50% Injectable 25 Gram(s) IV Push once  dextrose 50% Injectable 25 Gram(s) IV Push once  enoxaparin Injectable 40 milliGRAM(s) SubCutaneous daily  escitalopram 10 milliGRAM(s) Oral daily  famotidine    Tablet 20 milliGRAM(s) Oral daily  insulin lispro (HumaLOG) corrective regimen sliding scale   SubCutaneous Before meals and at bedtime  methylPREDNISolone sodium succinate Injectable 40 milliGRAM(s) IV Push every 12 hours    MEDICATIONS  (PRN):  dextrose 40% Gel 15 Gram(s) Oral once PRN Blood Glucose LESS THAN 70 milliGRAM(s)/deciliter  glucagon  Injectable 1 milliGRAM(s) IntraMuscular once PRN Glucose LESS THAN 70 milligrams/deciliter      Allergies    penicillin (Unknown)    Intolerances        Vital Signs Last 24 Hrs  T(C): 36.7 (08 Jul 2019 04:00), Max: 36.9 (07 Jul 2019 21:00)  T(F): 98 (08 Jul 2019 04:00), Max: 98.4 (07 Jul 2019 21:00)  HR: 81 (08 Jul 2019 04:00) (56 - 109)  BP: 117/70 (08 Jul 2019 04:00) (81/49 - 173/88)  BP(mean): 90 (08 Jul 2019 04:00) (61 - 122)  RR: 25 (08 Jul 2019 04:00) (15 - 40)  SpO2: 100% (08 Jul 2019 04:00) (86% - 100%)    I&O's Summary    06 Jul 2019 07:01  -  07 Jul 2019 07:00  --------------------------------------------------------  IN: 2228 mL / OUT: 700 mL / NET: 1528 mL    07 Jul 2019 07:01  -  08 Jul 2019 06:42  --------------------------------------------------------  IN: 785.3 mL / OUT: 750 mL / NET: 35.3 mL        ON EXAM:    General: NAD, mild short of breath  HEENT: Mucous membranes are Dry.   Lungs: Non-labored, bilateral rhonchi  Cardiovascular: Regular, S1 and S2, no murmurs, rubs, or gallops  Gastrointestinal: Bowel Sounds present, soft, nontender.   Lymph: b/l peripheral edema. No lymphadenopathy.  Skin: No rashes or ulcers  Psych:  awake, forgetful    LABS: All Labs Reviewed:                        12.4   8.58  )-----------( 118      ( 08 Jul 2019 05:29 )             37.8                         12.3   9.81  )-----------( 116      ( 07 Jul 2019 07:25 )             34.9                         13.4   18.59 )-----------( 189      ( 06 Jul 2019 07:23 )             40.1     08 Jul 2019 05:29    143    |  106    |  25     ----------------------------<  122    5.1     |  34     |  0.84   07 Jul 2019 07:25    137    |  103    |  29     ----------------------------<  152    4.7     |  28     |  0.84   06 Jul 2019 07:23    139    |  102    |  26     ----------------------------<  211    3.3     |  27     |  0.91     Ca    7.9        08 Jul 2019 05:29  Ca    6.7        07 Jul 2019 07:25  Ca    8.0        06 Jul 2019 07:23  Phos  3.4       08 Jul 2019 05:29  Phos  1.5       07 Jul 2019 07:25  Phos  2.4       06 Jul 2019 07:23  Mg     2.4       08 Jul 2019 05:29  Mg     2.0       07 Jul 2019 07:25  Mg     2.0       06 Jul 2019 07:23    TPro  5.2    /  Alb  2.4    /  TBili  0.3    /  DBili  x      /  AST  20     /  ALT  27     /  AlkPhos  80     08 Jul 2019 05:29  TPro  5.0    /  Alb  2.2    /  TBili  0.6    /  DBili  x      /  AST  36     /  ALT  42     /  AlkPhos  109    07 Jul 2019 07:25  TPro  5.9    /  Alb  2.7    /  TBili  0.4    /  DBili  x      /  AST  17     /  ALT  19     /  AlkPhos  88     06 Jul 2019 07:23          Blood Culture: Organism --  Gram Stain Blood -- Gram Stain   Numerous polymorphonuclear leukocytes per low power field  Rare Squamous epithelial cells per low power field  Rare Gram Positive Cocci in Clusters seen per oil power field  Specimen Source .Sputum Sputum  Culture-Blood --    Organism --  Gram Stain Blood -- Gram Stain --  Specimen Source .Blood Blood  Culture-Blood --

## 2019-07-08 NOTE — PROGRESS NOTE ADULT - ASSESSMENT
respiratory failure: improved stable off vent,      soft diet neuro: no pain medicine (not complaining of pain)    CV:   NSTEMI 2/2 increased respiratory demand: resolved, troponins plateaued, continue aspirin statin. Continue escitalopram (lexapro) for anxiety.   Cardiac reccs appreciated. Titrated off levophed.    echo: mild diastolic dysfunction mild RV decreased function. No intervention needed.     pulm:   acute respiratory failure: improved, stable off vent since yesterday 7/7 AM, % on room air, continue duonebs q6. Deescalate steroids from methylprednisolone to prednisone 40.      gi   started on soft diet today 7/8, tolerating appropriately,      renal: no acute issues     ID:   leukocytosis 2/2 acute respiratory failure asthma exacerbation: resolved   afebrile, leukocytosis resolved wbc 8.5, +rhinovirus.   pneumonia ruled out, d/santos abx for pneumonia coverage levoquin/vancomycin.          heme no acute issues    endo: BG , BG well controlled therefore may d/c Insulin sliding scale and BG finger sticks        dvt pp: enoxaparin     lines tubes: n/a    dispo: stable to transfer to the floors 84 yo F with history of asthma with chronic asthmatic bronchitis, diastolic HF, anxiety, MDD, hypokalemia, LE edema, from [facility] brought in by EMS for hypercapnic respiratory failure transfered to ICU for continued management.   +rhinovirus/enterovirus. Respiratory failure secondary to asthma exacerbation,  triggered by rhinovirus/enterovirus infection. Despite duonebs, solumedrol, and BIPAP, pt but continued to look anxious, grasp onto rails for accessory muscle use, and pull off the BIPAP. Visibly in acute distress, given use of accessory muscles at high risk for fatigue and decompensation, thus intubated. NSTEMI likely to increased demand of asthma exacerbation. CXR no acute findings. Doppler US lower extremities negative. Low suspicion for PE given presentation with diffuse wheezing, patient w/ hypercapnic respiratory failure: no signs of hypoxia. HCAP ruled out.  Patient has since been weaned off ventilator 7/7. Stable breathing room air. Started soft diet. Safe to transfer to floor.      neuro AMS improved A & O x3, no pain medicine       neuro: no pain medicine (not complaining of pain)    CV:   NSTEMI 2/2 increased respiratory demand: resolved, troponins plateaued, continue aspirin statin. Continue escitalopram (lexapro) for anxiety.   Cardiac reccs appreciated. Titrated off levophed.    echo: mild diastolic dysfunction mild RV decreased function. No intervention needed.     pulm:   acute respiratory failure: improved, stable off vent since yesterday 7/7 AM, % on room air, continue duonebs q6. Deescalate steroids from methylprednisolone to prednisone 40.      gi   started on soft diet today 7/8, tolerating appropriately,    renal: no acute issues     ID:   leukocytosis 2/2 acute respiratory failure asthma exacerbation: resolved   afebrile, leukocytosis resolved wbc 8.5, +rhinovirus.   pneumonia ruled out, d/santos abx for pneumonia coverage levoquin/vancomycin.          heme no acute issues    endo: BG , BG well controlled therefore may d/c Insulin sliding scale and BG finger sticks        dvt pp: enoxaparin     lines tubes: n/a    dispo: stable to transfer to the floors

## 2019-07-08 NOTE — PROGRESS NOTE ADULT - SUBJECTIVE AND OBJECTIVE BOX
Patient is a 85y old  Female who presents with a chief complaint of SOB (06 Jul 2019 08:15)      FROM ADMISSION H+P:   HPI:  84 y/o F with PMHx of Diastolic HF, bronchitis, and chronic LE edema presented to the [place] [facility] on 07/04/19 PATRICIA from [facility] with shortness of breath and cough.  Limited history as patient was unable to remember getting short of breath or getting to the hospital and admits to poor memory. pt states prior to this episode she was in her normal state of health  Per EMS patient received Lasix and Solumedrol.  Patient states she uses an nebulizer for her asthma when needed.  Denies sick contacts.  Denies Chest pain. Denies new exposure to animal.    In the ED, VS: T 98.0, /85, HR 12, RR30, O2 97 on O2.  CBC with WBC 20.  ABG 7.16/75/172/20 which improved with BIPAP repeat ABG 7.41/36/56/23. Trop 0.066. (05 Jul 2019 01:03)      ----  INTERVAL HPI/OVERNIGHT EVENTS: Pt seen and evaluated at the bedside. Still has productive cough but overall improved. Extubated over the weekend tolerating well.     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  Anxiety disorder  Major depressive disorder  Bronchitis  Cognitive communication deficit  Muscle weakness (generalized)  Insomnia  Hypokalemia  Edema  Essential (primary) hypertension  Unspecified diastolic (congestive) heart failure  No significant past surgical history      FAMILY HISTORY:  No pertinent family history in first degree relatives      Allergies    penicillin (Unknown)    Intolerances    Vital Signs Last 24 Hrs  T(C): 37 (08 Jul 2019 16:00), Max: 37 (08 Jul 2019 07:31)  T(F): 98.6 (08 Jul 2019 16:00), Max: 98.6 (08 Jul 2019 07:31)  HR: 80 (08 Jul 2019 16:00) (70 - 120)  BP: 156/68 (08 Jul 2019 16:00) (113/48 - 187/98)  BP(mean): 98 (08 Jul 2019 16:00) (65 - 131)  RR: 24 (08 Jul 2019 16:00) (21 - 40)  SpO2: 96% (08 Jul 2019 16:00) (91% - 100%)    General: WN/WD NAD  Neurology: alert and awake, nonfocal, DRUMMOND x 4  Respiratory: coarse bs   CV: RRR, S1S2, no murmurs, rubs or gallops  Abdominal: Soft, NT, ND +BS, Last BM  Extremities: No edema, + peripheral pulses    LABS:                        12.4   8.58  )-----------( 118      ( 08 Jul 2019 05:29 )             37.8     07-08    143  |  106  |  25<H>  ----------------------------<  122<H>  5.1   |  34<H>  |  0.84    Ca    7.9<L>      08 Jul 2019 05:29  Phos  3.4     07-08  Mg     2.4     07-08    TPro  5.2<L>  /  Alb  2.4<L>  /  TBili  0.3  /  DBili  x   /  AST  20  /  ALT  27  /  AlkPhos  80  07-08 07-05 @ 09:33   No growth to date.  --  --          Culture - Sputum (collected 07-05-19 @ 18:56)  Source: .Sputum Sputum  Gram Stain (07-06-19 @ 03:06):    Numerous polymorphonuclear leukocytes per low power field    Rare Squamous epithelial cells per low power field    Rare Gram Positive Cocci in Clusters seen per oil power field

## 2019-07-08 NOTE — PHYSICAL THERAPY INITIAL EVALUATION ADULT - ADDITIONAL COMMENTS
Patient LTC resident at [facility]. Daughters x 2, at bedside, states patient ambulates with/without AD in her room. Dtrs and patient states patient is able to ambulate but "chooses not to".

## 2019-07-08 NOTE — PROGRESS NOTE ADULT - SUBJECTIVE AND OBJECTIVE BOX
Patient is a 85y old  Female who presents with a chief complaint of SOB (08 Jul 2019 16:50)    24 hour events:   Successfully weaned off ventilator over the weekend. No acute events overnight.     REVIEW OF SYSTEMS  Constitutional: No fever, chills, fatigue  Neuro: No headache, numbness, weakness  Resp: No cough, wheezing, shortness of breath  CVS: No chest pain, palpitations, leg swelling  GI: No abdominal pain, nausea, vomiting, diarrhea   : No dysuria, frequency, incontinence  Skin: No itching, burning, rashes, or lesions   Msk: No joint pain or swelling  Psych: No depression, anxiety, mood swings  Heme: No bleeding    T(F): 98.6 (07-08-19 @ 16:00), Max: 98.6 (07-08-19 @ 07:31)  HR: 80 (07-08-19 @ 16:00) (70 - 120)  BP: 156/68 (07-08-19 @ 16:00) (113/48 - 187/98)  RR: 24 (07-08-19 @ 16:00) (21 - 40)  SpO2: 96% (07-08-19 @ 16:00) (91% - 100%)  Wt(kg): --            I&O's Summary    07-07 @ 07:01  -  07-08 @ 07:00  --------------------------------------------------------  IN: 835.3 mL / OUT: 1150 mL / NET: -314.7 mL    07-08 @ 07:01  -  07-08 @ 16:55  --------------------------------------------------------  IN: 240 mL / OUT: 0 mL / NET: 240 mL      Physical Exam:  General: Well developed, well nourished, No Acute Distress  HEENT: Normocephallic Atraumatic, PERRLA, EOMI bl, moist mucous membranes   Neck: Supple, nontender, no cervical lymphadenopathy  Neurology: A&Ox3, no focal neurological deficits: CNII-XII intact  Respiratory: Clear To Auscultation B/L, No Wheezes, rhonchi or rales  CV: Regular Rate and Rhythm, +S1/S2, no murmurs, rubs or gallops  Abdominal: Soft, Non-Tender, Non-Distended +Bowel Soundsx4  Extremities: No Clubbing, cyanosis or edema, + peripheral pulses: cap refill <2 secs LE bilaterally  Skin: warm, dry, normal color    MEDICATIONS      atorvastatin Oral  dextrose 40% Gel Oral PRN  dextrose 50% Injectable IV Push  dextrose 50% Injectable IV Push  dextrose 50% Injectable IV Push  glucagon  Injectable IntraMuscular PRN    ALBUTerol/ipratropium for Nebulization. Nebulizer  buDESOnide 160 MICROgram(s)/formoterol 4.5 MICROgram(s) Inhaler Inhalation    escitalopram Oral      aspirin  chewable Oral  enoxaparin Injectable SubCutaneous    famotidine    Tablet Oral      dextrose 5%. IV Continuous                                12.4   8.58  )-----------( 118      ( 08 Jul 2019 05:29 )             37.8       07-08    143  |  106  |  25<H>  ----------------------------<  122<H>  5.1   |  34<H>  |  0.84    Ca    7.9<L>      08 Jul 2019 05:29  Phos  3.4     07-08  Mg     2.4     07-08    TPro  5.2<L>  /  Alb  2.4<L>  /  TBili  0.3  /  DBili  x   /  AST  20  /  ALT  27  /  AlkPhos  80  07-08              .Sputum Sputum   Normal Respiratory Laine present   Numerous polymorphonuclear leukocytes per low power field  Rare Squamous epithelial cells per low power field  Rare Gram Positive Cocci in Clusters seen per oil power field 07-05 @ 18:56  .Blood Blood   No growth to date. -- 07-05 @ 09:33      Rapid RVP Result: Detected (07-05 @ 09:23)    Radiology: ***  Bedside lung ultrasound: ***  Bedside ECHO: ***    CENTRAL LINE: Y/N          DATE INSERTED:              REMOVE: Y/N  ALEJO: Y/N                        DATE INSERTED:              REMOVE: Y/N  A-LINE: Y/N                       DATE INSERTED:              REMOVE: Y/N    GLOBAL ISSUE/BEST PRACTICE  Analgesia:   Sedation:   CAM-ICU:   HOB elevation: yes  Stress ulcer prophylaxis:   VTE prophylaxis:   Glycemic control:   Nutrition:     CODE STATUS: ***  GOC discussion: Y Patient is a 85y old  Female who presents with a chief complaint of SOB (08 Jul 2019 16:50)    24 hour events:   Successfully weaned off ventilator yesterday 7/7/19 AM. No acute events overnight.     REVIEW OF SYSTEMS  Constitutional: No fever, chills, fatigue  Neuro: No headache, numbness, weakness  Resp: No cough, shortness of breath  CVS: No chest pain, palpitations, leg swelling  GI: No abdominal pain, nausea, vomiting, diarrhea   : No dysuria, frequency, incontinence  Skin: No itching, burning, rashes, or lesions   Msk: No joint pain or swelling  Psych: No depression, anxiety, mood swings  Heme: No bleeding    T(F): 98.6 (07-08-19 @ 16:00), Max: 98.6 (07-08-19 @ 07:31)  HR: 80 (07-08-19 @ 16:00) (70 - 120)  BP: 156/68 (07-08-19 @ 16:00) (113/48 - 187/98)  RR: 24 (07-08-19 @ 16:00) (21 - 40)  SpO2: 96% (07-08-19 @ 16:00) (91% - 100%)  Wt(kg): --            I&O's Summary    07-07 @ 07:01  -  07-08 @ 07:00  --------------------------------------------------------  IN: 835.3 mL / OUT: 1150 mL / NET: -314.7 mL    07-08 @ 07:01  -  07-08 @ 16:55  --------------------------------------------------------  IN: 240 mL / OUT: 0 mL / NET: 240 mL      Physical Exam:  General: lying in hospital bed comfortably, no Acute Distress  HEENT: normocephallic Atraumatic, PERRLA, EOMI bl, moist mucous membranes   Neck: Supple, nontender, no cervical lymphadenopathy  Neurology: A&Ox3, no focal neurological deficits: CNII-XII intact  Respiratory: +bilateral expiratory wheezing, moving air appropriately  CV: Regular Rate and Rhythm, +S1/S2, no murmurs, rubs or gallops  Abdominal: Soft, Non-Tender, Non-Distended +Bowel Soundsx4  Extremities: +bilateral peripheral edema, no Clubbing, cyanosis, + peripheral pulses: cap refill <2 secs LE bilaterally  Skin: warm, dry, normal color    MEDICATIONS      atorvastatin Oral  dextrose 40% Gel Oral PRN  dextrose 50% Injectable IV Push  dextrose 50% Injectable IV Push  dextrose 50% Injectable IV Push  glucagon  Injectable IntraMuscular PRN    ALBUTerol/ipratropium for Nebulization. Nebulizer  buDESOnide 160 MICROgram(s)/formoterol 4.5 MICROgram(s) Inhaler Inhalation    escitalopram Oral      aspirin  chewable Oral  enoxaparin Injectable SubCutaneous    famotidine    Tablet Oral      dextrose 5%. IV Continuous                                12.4   8.58  )-----------( 118      ( 08 Jul 2019 05:29 )             37.8       07-08    143  |  106  |  25<H>  ----------------------------<  122<H>  5.1   |  34<H>  |  0.84    Ca    7.9<L>      08 Jul 2019 05:29  Phos  3.4     07-08  Mg     2.4     07-08    TPro  5.2<L>  /  Alb  2.4<L>  /  TBili  0.3  /  DBili  x   /  AST  20  /  ALT  27  /  AlkPhos  80  07-08              .Sputum Sputum   Normal Respiratory Laine present   Numerous polymorphonuclear leukocytes per low power field  Rare Squamous epithelial cells per low power field  Rare Gram Positive Cocci in Clusters seen per oil power field 07-05 @ 18:56  .Blood Blood   No growth to date. -- 07-05 @ 09:33      Rapid RVP Result: Detected (07-05 @ 09:23)      CENTRAL LINE: N            ALEJO: N                          A-LINE: N                           GLOBAL ISSUE/BEST PRACTICE  Analgesia: n  Sedation: n  CAM-ICU: negative  HOB elevation: yes  Stress ulcer prophylaxis: protonics   VTE prophylaxis: enoxaparin  Glycemic control: y  Nutrition: soft diet     CODE STATUS: full code   GOC discussion: Y

## 2019-07-08 NOTE — PROGRESS NOTE ADULT - PROBLEM SELECTOR PLAN 1
s/p intubation now extubated  - acute respiratory failure likely multifactorial but enterorhinovirus and status asthmaticus are contributing to condition, suspect superimposed bacterial PNA  Sepsis shock requiring vasopressors.   -off antibiotics as per MICU  -Steroids PO

## 2019-07-08 NOTE — PROGRESS NOTE ADULT - ASSESSMENT
84 y/o F with PMHx of Diastolic HF, bronchitis, and chronic LE edema presenting from [facility] with shortness of breath and cough, admitted for acute hypoxic respiratory failure with hypercapnia.

## 2019-07-08 NOTE — PHYSICAL THERAPY INITIAL EVALUATION ADULT - PERTINENT HX OF CURRENT PROBLEM, REHAB EVAL
86 y/o F with PMHx of Diastolic HF, bronchitis, and chronic LE edema presented to the [place] on 07/04/19 PATRICIA from [facility] with shortness of breath and cough.

## 2019-07-08 NOTE — PROGRESS NOTE ADULT - ASSESSMENT
84 y/o F with PMHx of Diastolic HF, bronchitis, and chronic LE edema presenting from [facility] with shortness of breath and cough, admitted for acute hypoxic respiratory failure with hypercapnia, status asthmaticus, enterorhinovirus, intubated, positive troponin    - Troponin levels seem to have plateaued.  The trend, along with the CK, is not suggested of ACS, but rather demand ischemia related to her respiratory decompensation  - She did have ST segment changes on  her EKG, again likely related to ischemic demand. Repeat her EKG this morning.  - No need to trend enzymes any longer  - Continue aspirin and statin  - Patient has a history of Hfpef. Hold Lasix in the short term.  - Levophed has been titrated to off  - echo with normal lv function, mild diastolic dysfunction, mild rve and decreased function.  - chest PT  - monitor and replete lytes, keep K>4, Mg>2  - All other workup per primary team  - Will follow.  Patient is critically ill with a high risk of decompensation.  I have personally provided 35 minutes of critical care time excluding time spent on separate procedures.

## 2019-07-09 DIAGNOSIS — E83.39 OTHER DISORDERS OF PHOSPHORUS METABOLISM: ICD-10-CM

## 2019-07-09 DIAGNOSIS — F41.9 ANXIETY DISORDER, UNSPECIFIED: ICD-10-CM

## 2019-07-09 DIAGNOSIS — I50.30 UNSPECIFIED DIASTOLIC (CONGESTIVE) HEART FAILURE: ICD-10-CM

## 2019-07-09 DIAGNOSIS — Z29.9 ENCOUNTER FOR PROPHYLACTIC MEASURES, UNSPECIFIED: ICD-10-CM

## 2019-07-09 DIAGNOSIS — J45.909 UNSPECIFIED ASTHMA, UNCOMPLICATED: ICD-10-CM

## 2019-07-09 DIAGNOSIS — R60.0 LOCALIZED EDEMA: ICD-10-CM

## 2019-07-09 DIAGNOSIS — I21.4 NON-ST ELEVATION (NSTEMI) MYOCARDIAL INFARCTION: ICD-10-CM

## 2019-07-09 LAB
ALBUMIN SERPL ELPH-MCNC: 2.5 G/DL — LOW (ref 3.3–5)
ALP SERPL-CCNC: 83 U/L — SIGNIFICANT CHANGE UP (ref 40–120)
ALT FLD-CCNC: 33 U/L — SIGNIFICANT CHANGE UP (ref 12–78)
ANION GAP SERPL CALC-SCNC: 4 MMOL/L — LOW (ref 5–17)
AST SERPL-CCNC: 33 U/L — SIGNIFICANT CHANGE UP (ref 15–37)
BASOPHILS # BLD AUTO: 0.01 K/UL — SIGNIFICANT CHANGE UP (ref 0–0.2)
BASOPHILS NFR BLD AUTO: 0.1 % — SIGNIFICANT CHANGE UP (ref 0–2)
BILIRUB SERPL-MCNC: 0.5 MG/DL — SIGNIFICANT CHANGE UP (ref 0.2–1.2)
BUN SERPL-MCNC: 21 MG/DL — SIGNIFICANT CHANGE UP (ref 7–23)
CALCIUM SERPL-MCNC: 7.9 MG/DL — LOW (ref 8.5–10.1)
CHLORIDE SERPL-SCNC: 104 MMOL/L — SIGNIFICANT CHANGE UP (ref 96–108)
CO2 SERPL-SCNC: 34 MMOL/L — HIGH (ref 22–31)
CREAT SERPL-MCNC: 0.78 MG/DL — SIGNIFICANT CHANGE UP (ref 0.5–1.3)
EOSINOPHIL # BLD AUTO: 0.06 K/UL — SIGNIFICANT CHANGE UP (ref 0–0.5)
EOSINOPHIL NFR BLD AUTO: 0.7 % — SIGNIFICANT CHANGE UP (ref 0–6)
GLUCOSE SERPL-MCNC: 97 MG/DL — SIGNIFICANT CHANGE UP (ref 70–99)
HCT VFR BLD CALC: 39.7 % — SIGNIFICANT CHANGE UP (ref 34.5–45)
HGB BLD-MCNC: 12.9 G/DL — SIGNIFICANT CHANGE UP (ref 11.5–15.5)
IMM GRANULOCYTES NFR BLD AUTO: 0.6 % — SIGNIFICANT CHANGE UP (ref 0–1.5)
LYMPHOCYTES # BLD AUTO: 2.08 K/UL — SIGNIFICANT CHANGE UP (ref 1–3.3)
LYMPHOCYTES # BLD AUTO: 24.3 % — SIGNIFICANT CHANGE UP (ref 13–44)
MAGNESIUM SERPL-MCNC: 2.3 MG/DL — SIGNIFICANT CHANGE UP (ref 1.6–2.6)
MCHC RBC-ENTMCNC: 29.4 PG — SIGNIFICANT CHANGE UP (ref 27–34)
MCHC RBC-ENTMCNC: 32.5 GM/DL — SIGNIFICANT CHANGE UP (ref 32–36)
MCV RBC AUTO: 90.4 FL — SIGNIFICANT CHANGE UP (ref 80–100)
MONOCYTES # BLD AUTO: 0.77 K/UL — SIGNIFICANT CHANGE UP (ref 0–0.9)
MONOCYTES NFR BLD AUTO: 9 % — SIGNIFICANT CHANGE UP (ref 2–14)
NEUTROPHILS # BLD AUTO: 5.6 K/UL — SIGNIFICANT CHANGE UP (ref 1.8–7.4)
NEUTROPHILS NFR BLD AUTO: 65.3 % — SIGNIFICANT CHANGE UP (ref 43–77)
NRBC # BLD: 0 /100 WBCS — SIGNIFICANT CHANGE UP (ref 0–0)
PHOSPHATE SERPL-MCNC: 2.2 MG/DL — LOW (ref 2.5–4.5)
PLATELET # BLD AUTO: 132 K/UL — LOW (ref 150–400)
POTASSIUM SERPL-MCNC: 4.5 MMOL/L — SIGNIFICANT CHANGE UP (ref 3.5–5.3)
POTASSIUM SERPL-SCNC: 4.5 MMOL/L — SIGNIFICANT CHANGE UP (ref 3.5–5.3)
PROT SERPL-MCNC: 5.3 G/DL — LOW (ref 6–8.3)
RBC # BLD: 4.39 M/UL — SIGNIFICANT CHANGE UP (ref 3.8–5.2)
RBC # FLD: 13.3 % — SIGNIFICANT CHANGE UP (ref 10.3–14.5)
SODIUM SERPL-SCNC: 142 MMOL/L — SIGNIFICANT CHANGE UP (ref 135–145)
WBC # BLD: 8.57 K/UL — SIGNIFICANT CHANGE UP (ref 3.8–10.5)
WBC # FLD AUTO: 8.57 K/UL — SIGNIFICANT CHANGE UP (ref 3.8–10.5)

## 2019-07-09 PROCEDURE — 99233 SBSQ HOSP IP/OBS HIGH 50: CPT

## 2019-07-09 PROCEDURE — 99232 SBSQ HOSP IP/OBS MODERATE 35: CPT

## 2019-07-09 PROCEDURE — 71045 X-RAY EXAM CHEST 1 VIEW: CPT | Mod: 26

## 2019-07-09 RX ORDER — SODIUM,POTASSIUM PHOSPHATES 278-250MG
1 POWDER IN PACKET (EA) ORAL ONCE
Refills: 0 | Status: COMPLETED | OUTPATIENT
Start: 2019-07-09 | End: 2019-07-09

## 2019-07-09 RX ORDER — MULTIVIT-MIN/FERROUS GLUCONATE 9 MG/15 ML
1 LIQUID (ML) ORAL DAILY
Refills: 0 | Status: CANCELLED | OUTPATIENT
Start: 2019-07-09 | End: 2019-07-10

## 2019-07-09 RX ADMIN — ATORVASTATIN CALCIUM 80 MILLIGRAM(S): 80 TABLET, FILM COATED ORAL at 21:31

## 2019-07-09 RX ADMIN — Medication 40 MILLIGRAM(S): at 06:15

## 2019-07-09 RX ADMIN — ESCITALOPRAM OXALATE 10 MILLIGRAM(S): 10 TABLET, FILM COATED ORAL at 12:33

## 2019-07-09 RX ADMIN — Medication 81 MILLIGRAM(S): at 12:33

## 2019-07-09 RX ADMIN — BUDESONIDE AND FORMOTEROL FUMARATE DIHYDRATE 2 PUFF(S): 160; 4.5 AEROSOL RESPIRATORY (INHALATION) at 12:33

## 2019-07-09 RX ADMIN — BUDESONIDE AND FORMOTEROL FUMARATE DIHYDRATE 2 PUFF(S): 160; 4.5 AEROSOL RESPIRATORY (INHALATION) at 21:31

## 2019-07-09 RX ADMIN — Medication 3 MILLILITER(S): at 19:16

## 2019-07-09 RX ADMIN — FAMOTIDINE 20 MILLIGRAM(S): 10 INJECTION INTRAVENOUS at 12:33

## 2019-07-09 RX ADMIN — ENOXAPARIN SODIUM 40 MILLIGRAM(S): 100 INJECTION SUBCUTANEOUS at 12:34

## 2019-07-09 RX ADMIN — Medication 3 MILLILITER(S): at 13:30

## 2019-07-09 RX ADMIN — Medication 1 PACKET(S): at 13:44

## 2019-07-09 RX ADMIN — Medication 3 MILLILITER(S): at 07:32

## 2019-07-09 NOTE — PROGRESS NOTE ADULT - PROBLEM SELECTOR PLAN 5
acute, stable   - Replete serum phosphorus.    - Current serum phosphorus 2.2. chronic, compensated   - no signs of fluid overload at this time  - TE Echo (7/6) showed Grade I diastolic dysfunction, calcified trileaflet aortic valve, mitral annular calcification with mild mitral insufficiency. RV enlargement with borderline RV systolic dysfunction, mild Right atrial enlargement.   - EKG (7/6) showed sinus rhythm with premature atrial complexes and prolonged QT.

## 2019-07-09 NOTE — CHART NOTE - NSCHARTNOTEFT_GEN_A_CORE
Assessment/Follow up: Pt awake/alert at time of visit; forgetful at times; states she has some trouble with short term memory. Extubated 7/7. Soft diet initiated and well tolerated. Fair appetite/po intake reported. No report N/V/D. +BM 7/9. No report trouble chewing/swallowing soft diet and thin liquids. Pta pt follow low Na diet; usually pretty good appetite. Pt declined ensure/glucerna nutritional suppelments. Food prefs/meal alternatives obtained to increase po intake/meal satisfaction. Encourage consumption of HBV protein sources. Recommend assistance/encouragement with meals. Will remain available.     Factors impacting intake: [ ] none [ ] nausea  [ ] vomiting [ ] diarrhea [ ] constipation  [x ]chewing problems [ ] swallowing issues  [x ] other: acute illness    Diet Presciption: Diet, Soft (07-07-19 @ 22:31)    Intake: fair    Current Weight: Weight (kg): 68 (07-04 @ 20:43); 145.9lbs(7/9)    Pertinent Medications: MEDICATIONS  (STANDING):  ALBUTerol/ipratropium for Nebulization. 3 milliLiter(s) Nebulizer every 6 hours  aspirin  chewable 81 milliGRAM(s) Oral daily  atorvastatin 80 milliGRAM(s) Oral at bedtime  buDESOnide 160 MICROgram(s)/formoterol 4.5 MICROgram(s) Inhaler 2 Puff(s) Inhalation two times a day  dextrose 5%. 1000 milliLiter(s) (50 mL/Hr) IV Continuous <Continuous>  dextrose 50% Injectable 12.5 Gram(s) IV Push once  dextrose 50% Injectable 25 Gram(s) IV Push once  dextrose 50% Injectable 25 Gram(s) IV Push once  enoxaparin Injectable 40 milliGRAM(s) SubCutaneous daily  escitalopram 10 milliGRAM(s) Oral daily  famotidine    Tablet 20 milliGRAM(s) Oral daily  predniSONE   Tablet 40 milliGRAM(s) Oral daily    MEDICATIONS  (PRN):  dextrose 40% Gel 15 Gram(s) Oral once PRN Blood Glucose LESS THAN 70 milliGRAM(s)/deciliter  glucagon  Injectable 1 milliGRAM(s) IntraMuscular once PRN Glucose LESS THAN 70 milligrams/deciliter    Pertinent Labs: 07-09 Na142 mmol/L Glu 97 mg/dL K+ 4.5 mmol/L Cr  0.78 mg/dL BUN 21 mg/dL 07-09 Phos 2.2 mg/dL<L> 07-09 Alb 2.5 g/dL<L> 07-06 [id number] 5.6 %     CAPILLARY BLOOD GLUCOSE        Skin: perineum IAD, sacrum I. bennett 17.     Estimated Needs:   [x ] no change since previous assessment  [ ] recalculated:     Previous Nutrition Diagnosis:   [x ]Altered nutrition related labs (glucose)- resolved [x ] Malnutrition     Nutrition Diagnosis is [x ] ongoing  [ ] resolved [ ] not applicable     New Nutrition Diagnosis: [x ] not applicable       Interventions:   Recommend  [x ] Change Diet To: Low Na, soft diet with emphasis on HBV protein sources.   [ ] Nutrition Supplement  [ ] Nutrition Support  [x ] Other: Pt declined nutritional supplments. Recommend MVI with minerals daily.     Monitoring and Evaluation:   [ x] PO intake [ x ] Tolerance to diet prescription [ x ] weights [ x ] labs[ x ] follow up per protocol  [ ] other: Assessment/Follow up: Pt awake/alert at time of visit; forgetful at times; states she has some trouble with short term memory. Extubated 7/7. Soft diet initiated and well tolerated. Fair appetite/po intake reported. No report N/V/D. +BM 7/9. No report trouble chewing/swallowing soft diet and thin liquids. Pta pt follow low Na diet; usually pretty good appetite. Pt declined ensure/glucerna nutritional supplements. Food prefs/meal alternatives obtained to increase po intake/meal satisfaction. Encourage consumption of HBV protein sources. Recommend assistance/encouragement with meals. Will remain available.     Factors impacting intake: [ ] none [ ] nausea  [ ] vomiting [ ] diarrhea [ ] constipation  [x ]chewing problems [ ] swallowing issues  [x ] other: acute illness    Diet Presciption: Diet, Soft (07-07-19 @ 22:31)    Intake: fair    Current Weight: Weight (kg): 68 (07-04 @ 20:43); 145.9lbs(7/9)    Pertinent Medications: MEDICATIONS  (STANDING):  ALBUTerol/ipratropium for Nebulization. 3 milliLiter(s) Nebulizer every 6 hours  aspirin  chewable 81 milliGRAM(s) Oral daily  atorvastatin 80 milliGRAM(s) Oral at bedtime  buDESOnide 160 MICROgram(s)/formoterol 4.5 MICROgram(s) Inhaler 2 Puff(s) Inhalation two times a day  dextrose 5%. 1000 milliLiter(s) (50 mL/Hr) IV Continuous <Continuous>  dextrose 50% Injectable 12.5 Gram(s) IV Push once  dextrose 50% Injectable 25 Gram(s) IV Push once  dextrose 50% Injectable 25 Gram(s) IV Push once  enoxaparin Injectable 40 milliGRAM(s) SubCutaneous daily  escitalopram 10 milliGRAM(s) Oral daily  famotidine    Tablet 20 milliGRAM(s) Oral daily  predniSONE   Tablet 40 milliGRAM(s) Oral daily    MEDICATIONS  (PRN):  dextrose 40% Gel 15 Gram(s) Oral once PRN Blood Glucose LESS THAN 70 milliGRAM(s)/deciliter  glucagon  Injectable 1 milliGRAM(s) IntraMuscular once PRN Glucose LESS THAN 70 milligrams/deciliter    Pertinent Labs: 07-09 Na142 mmol/L Glu 97 mg/dL K+ 4.5 mmol/L Cr  0.78 mg/dL BUN 21 mg/dL 07-09 Phos 2.2 mg/dL<L> 07-09 Alb 2.5 g/dL<L> 07-06 [id number] 5.6 %     CAPILLARY BLOOD GLUCOSE        Skin: perineum IAD, sacrum I. bennett 17.     Estimated Needs:   [x ] no change since previous assessment  [ ] recalculated:     Previous Nutrition Diagnosis:   [x ]Altered nutrition related labs (glucose)- resolved [x ] Malnutrition     Nutrition Diagnosis is [x ] ongoing  [ ] resolved [ ] not applicable     New Nutrition Diagnosis: [x ] not applicable       Interventions:   Recommend  [x ] Change Diet To: Low Na, soft diet with emphasis on HBV protein sources.   [ ] Nutrition Supplement  [ ] Nutrition Support  [x ] Other: Pt declined nutritional supplements. Recommend MVI with minerals daily. Consider SLP evaluation per MD discretion.     Monitoring and Evaluation:   [ x] PO intake [ x ] Tolerance to diet prescription [ x ] weights [ x ] labs[ x ] follow up per protocol  [ ] other:

## 2019-07-09 NOTE — PROGRESS NOTE ADULT - ASSESSMENT
86 y/o F with PMHx of Diastolic HF, bronchitis, and chronic LE edema presenting from [facility] with shortness of breath and cough, admitted for acute hypoxic respiratory failure with hypercapnia, status asthmaticus, enterorhinovirus, intubated, positive troponin    Elevated Troponins  - Troponin levels seem to have peaked and trended down.  The trend, along with the CK, is not suggestive of ACS, but rather demand ischemia related to her respiratory decompensation  - She had a non-specific TWI in V1 but no clinical evidence of cardiac ischemia  - Continue aspirin and statin.  Will lower statin dose    HFpEF  - Patient has a history of HFpEF.  Hold Lasix in the short term.  - Echo with normal LV function, mild diastolic dysfunction, mild RVE and decreased function.  - She does have trace chronic BLE but no evidence of volume overload  - Monitor and replete lytes, keep K>4, Mg>2    Respiratory Failure  - She is s/p intubation and successful extubation  - She is now on NC.  She is on home NC prn  - Continue bronchodilators and steroid  - Encourage incentive spirometer  - Pulmonary toilet    DVT ppx  - Per Primary    - Further cardiac workup pending clinical course  - All other workup per primary team    Kerry Freire [facility]  Cardiology   Spectra #[phone]

## 2019-07-09 NOTE — PROGRESS NOTE ADULT - ASSESSMENT
84 y/o F with PMHx of Diastolic HF, bronchitis, and chronic LE edema presented to the [facility] on 07/04/19 from [facility] with shortness of breath and cough and admitted for acute respiratory failure with hypercapnia. 86 y/o F with PMHx of Diastolic HF, bronchitis, and chronic LE edema presented with shortness of breath and cough, admitted for acute respiratory failure with hypercapnia, intubated in ICU now extubated 2 days prior, transferred to floors

## 2019-07-09 NOTE — PROGRESS NOTE ADULT - PROBLEM SELECTOR PLAN 1
Possibly exacerbated by status asthmaticus with enterorhinovirus. Suspected superimposed bacterial pneumonia with Staph Aureus involvement.   - s/p intubation, now extubated   - consult pulm    - Start Spiriva   - Continue Duoneb, Symbacort, and prednisone   - Continue to hold antibiotics Possibly exacerbated by status asthmaticus with enterorhinovirus. Suspected superimposed bacterial pneumonia with Staph Aureus involvement.    - Current serum bicarb 34, unchanged from yesterday    - s/p intubation, now extubated   - Consult pulm    - Start Spiriva   - Continue  prednisone   - Continue to hold antibiotics Possibly exacerbated by status asthmaticus with enterorhinovirus. Suspected superimposed bacterial pneumonia with Staph Aureus involvement.    - Current serum bicarb 34, unchanged from yesterday    - s/p intubation, now extubated   - Consult pulm    - Start Spiriva   - Continue prednisone   - Continue to hold antibiotics Acute asthma exac 2/2 viral inf  - +wheezing and rhonchi on exam today  - will obtain repeat CXR  - consult Pulm Dr Talbot  - Continue DuoNeb and Symbicort   - Consider starting Spiriva pending pulm recs  - continue prednisone  - peak flow daily

## 2019-07-09 NOTE — PROGRESS NOTE ADULT - SUBJECTIVE AND OBJECTIVE BOX
[facility] -- Modesto Moreno, Marco A Roy, Ramon Whipple Savella  Office # [phone]    Follow Up:  Respiratory Failure; Elevated troponins    Subjective/Observations: Sitting on the chair, on RA.  No c/o SOB.  LAO is at baseline.  With productive cough.  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 3 milliLiter(s) Nebulizer every 6 hours  aspirin  chewable 81 milliGRAM(s) Oral daily  atorvastatin 80 milliGRAM(s) Oral at bedtime  buDESOnide 160 MICROgram(s)/formoterol 4.5 MICROgram(s) Inhaler 2 Puff(s) Inhalation two times a day  dextrose 5%. 1000 milliLiter(s) (50 mL/Hr) IV Continuous <Continuous>  dextrose 50% Injectable 12.5 Gram(s) IV Push once  dextrose 50% Injectable 25 Gram(s) IV Push once  dextrose 50% Injectable 25 Gram(s) IV Push once  enoxaparin Injectable 40 milliGRAM(s) SubCutaneous daily  escitalopram 10 milliGRAM(s) Oral daily  famotidine    Tablet 20 milliGRAM(s) Oral daily  predniSONE   Tablet 40 milliGRAM(s) Oral daily    MEDICATIONS  (PRN):  dextrose 40% Gel 15 Gram(s) Oral once PRN Blood Glucose LESS THAN 70 milliGRAM(s)/deciliter  glucagon  Injectable 1 milliGRAM(s) IntraMuscular once PRN Glucose LESS THAN 70 milligrams/deciliter    Allergies    penicillin (Unknown)    Intolerances    Vital Signs Last 24 Hrs  T(C): 36.8 (09 Jul 2019 05:05), Max: 37 (08 Jul 2019 16:00)  T(F): 98.3 (09 Jul 2019 05:05), Max: 98.6 (08 Jul 2019 16:00)  HR: 81 (09 Jul 2019 07:34) (78 - 120)  BP: 143/83 (09 Jul 2019 05:05) (120/58 - 187/98)  BP(mean): 103 (08 Jul 2019 19:38) (83 - 131)  RR: 16 (09 Jul 2019 05:05) (16 - 40)  SpO2: 99% (09 Jul 2019 07:34) (92% - 100%)  I&O's Summary    08 Jul 2019 07:01  -  09 Jul 2019 07:00  --------------------------------------------------------  IN: 1300 mL / OUT: 0 mL / NET: 1300 mL     PHYSICAL EXAM:  TELE: On tele  Constitutional: NAD, awake and alert, well-developed  HEENT: Moist Mucous Membranes, Anicteric  Pulmonary: Non-labored, breath sounds are diminiushed bilaterally, No wheezing, rales. + rhonchi R>L  Cardiovascular: Regular, S1 and S2, No murmurs, rubs, gallops or clicks  Gastrointestinal: Bowel Sounds present, soft, nontender.   Lymph: No peripheral edema. No lymphadenopathy.  Skin: No visible rashes or ulcers.  Psych:  Mood & affect appropriate  LABS: All Labs Reviewed:                        12.9   8.57  )-----------( 132      ( 09 Jul 2019 08:15 )             39.7                         12.4   8.58  )-----------( 118      ( 08 Jul 2019 05:29 )             37.8                         12.3   9.81  )-----------( 116      ( 07 Jul 2019 07:25 )             34.9     09 Jul 2019 08:15    142    |  104    |  21     ----------------------------<  97     4.5     |  34     |  0.78   08 Jul 2019 05:29    143    |  106    |  25     ----------------------------<  122    5.1     |  34     |  0.84   07 Jul 2019 07:25    137    |  103    |  29     ----------------------------<  152    4.7     |  28     |  0.84     Ca    7.9        09 Jul 2019 08:15  Ca    7.9        08 Jul 2019 05:29  Ca    6.7        07 Jul 2019 07:25  Phos  2.2       09 Jul 2019 08:15  Phos  3.4       08 Jul 2019 05:29  Phos  1.5       07 Jul 2019 07:25  Mg     2.3       09 Jul 2019 08:15  Mg     2.4       08 Jul 2019 05:29  Mg     2.0       07 Jul 2019 07:25    TPro  5.3    /  Alb  2.5    /  TBili  0.5    /  DBili  x      /  AST  33     /  ALT  33     /  AlkPhos  83     09 Jul 2019 08:15  TPro  5.2    /  Alb  2.4    /  TBili  0.3    /  DBili  x      /  AST  20     /  ALT  27     /  AlkPhos  80     08 Jul 2019 05:29  TPro  5.0    /  Alb  2.2    /  TBili  0.6    /  DBili  x      /  AST  36     /  ALT  42     /  AlkPhos  109    07 Jul 2019 07:25    < from: TTE Echo Doppler w/o Cont (07.06.19 @ 14:24) >     EXAM:  ECHO TTE WO CON COMP W DOPPLR      PROCEDURE DATE:  07/06/2019      INTERPRETATION:  Ordering Physician: RAUL RUIZ [id number]    Indication: Abnormal EKG  Technologist Initials: AS  Study Quality: Technically difficult and limited   A complete echocardiographic study was performed utilizing standard   protocol including spectral and color Doppler in all echocardiographic   windows.    Height: 165 cm  Weight: 68 kg  BSA: 1.75 sq m  Blood Pressure: 108/50    MEASUREMENTS  IVS: 1.2cm  PWT: 1.2cm  LA: 2.4cm  AO: 3.1cm  LVIDd: 4.1cm  LVIDs: 2.5cm    RVSP: 40mmHg    FINDINGS  Left Ventricle: The endocardium is not well visualized. Grossly, normal   left ventricular systolic function.  Aortic Valve: Calcified trileafletaortic valve. Minimal aortic   insufficiency. Peak transaortic gradient equals 16 mmHg, and mean   transaortic gradient equals 9 mm Hg, consistent with mild aortic stenosis.  Mitral Valve: Mitral annular calcification and calcified mitral valve   leaflets. Mild mitral insufficiency  Tricuspid Valve: Normal tricuspid valve. Mild tricuspid insufficiency.  Pulmonic Valve: Not well-visualized  Left Atrium: Normal  Right Ventricle: Right ventricular enlargement with borderline right   ventricular systolic function.  Right Atrium: Mildly enlarged  Diastolic Function: Grade 1 diastolic dysfunction  Pericardium/Pleura: Normal pericardium with no pericardial effusion.    CONCLUSIONS:  Technically difficult study.  1. The endocardium is not well visualized. Grossly, normal left   ventricular systolic function.  2.Calcified trileaflet aortic valve. Minimal aortic insufficiency. Peak   transaortic gradient equals 16 mmHg, and mean transaortic gradient equals   9 mm Hg, consistent with mild aortic stenosis.  3. Mitral annular calcification and calcified mitral valve leaflets with   mild mitral insufficiency.  4. Normal left atrium.  5. Right ventricular enlargement with borderline right ventricular   systolic function. Mild right atrial enlargement.  6. Grade 1 diastolic dysfunction.    MABLE KIM M.D., ATTENDING CARDIOLOGIST  This document has been electronically signed. Jul 7 2019  8:48AM     < end of copied text >    < from: Xray Chest 1 View- PORTABLE-Urgent (07.05.19 @ 14:14) >    EXAM:  XR CHEST PORTABLE URGENT 1V                          PROCEDURE DATE:  07/05/2019      INTERPRETATION:  Clinical information: Endotracheal tube placement.    Technique: Frontal view of the chest.    Comparison: Prior chest x-ray examination from 7/5/2019.    Findings: The patient is status post endotracheal tube placement in good   position. There is an NG Tube with its tip projecting below the   diaphragm. The patient is rotated to the left.    The lungs are clear. The heart size is normal. Multilevel degenerative   changes are noted within the imaged potions of the spine.    IMPRESSION: Interval intubation and placement of an NG tube.    No other interval changes.    JALEEL PAZ M.D., ATTENDING RADIOLOGIST  This document has been electronically signed. Jul 5 2019  2:24PM      < end of copied text >    < from: 12 Lead ECG (07.06.19 @ 11:52) >    Ventricular Rate 69 BPM    Atrial Rate 69 BPM    P-R Interval 174 ms    QRS Duration 82 ms    Q-T Interval 458 ms    QTC Calculation(Bezet) 490 ms    R Axis 59 degrees    T Axis 74 degrees    Diagnosis Line Sinus rhythm with premature atrial complexes  Prolonged QT  Confirmed by MABLE WHIPPLE ([id number]) on 7/7/2019 8:17:13 AM    < end of copied text >

## 2019-07-09 NOTE — CONSULT NOTE ADULT - SUBJECTIVE AND OBJECTIVE BOX
Date/Time Patient Seen:  		  Referring MD:   Data Reviewed	       Patient is a 85y old  Female who presents with a chief complaint of SOB (09 Jul 2019 10:42)      Subjective/HPI  in bed  seen and examine  vs and meds reviewed  labs reviewed  H and P reviewed  ICU notes reviewed    86 yo F with history of asthma with chronic asthmatic bronchitis, diastolic HF, anxiety, MDD, hypokalemia, LE edema, from [facility] brought in by EMS for hypercapnic respiratory failure transfered to ICU for continued management.   +rhinovirus/enterovirus. Respiratory failure secondary to asthma exacerbation,  triggered by rhinovirus/enterovirus infection. Despite duonebs, solumedrol, and BIPAP, pt but continued to look anxious, grasp onto rails for accessory muscle use, and pull off the BIPAP. Visibly in acute distress, given use of accessory muscles at high risk for fatigue and decompensation, thus intubated. NSTEMI likely to increased demand of asthma exacerbation. CXR no acute findings. Doppler US lower extremities negative. Low suspicion for PE given presentation with diffuse wheezing, patient w/ hypercapnic respiratory failure: no signs of hypoxia. HCAP ruled out.  Patient has since been weaned off ventilator 7/7. Stable breathing room air. Started soft diet. Safe to transfer to floor.    Language:  · Patient/Family of Limited English Proficiency	No       History of Present Illness:  Reason for Admission: SOB  History of Present Illness:   84 y/o F with PMHx of Diastolic HF, bronchitis, and chronic LE edema presented to the [place] [facility] on 07/04/19 BIBEMS from [facility] with shortness of breath and cough.  Limited history as patient was unable to remember getting short of breath or getting to the hospital and admits to poor memory. pt states prior to this episode she was in her normal state of health  Per EMS patient received Lasix and Solumedrol.  Patient states she uses an nebulizer for her asthma when needed.  Denies sick contacts.  Denies Chest pain. Denies new exposure to animal.  Patient History:    Past Medical, Past Surgical, and Family History:  PAST MEDICAL HISTORY:  Anxiety disorder     Asthma     Bronchitis     Chronic embolism and thrombosis of unspecified deep veins of right lower extremity     Cognitive communication deficit     Edema     Essential (primary) hypertension     Hypokalemia     Insomnia     Major depressive disorder     Muscle weakness (generalized)     NSTEMI (non-ST elevated myocardial infarction)     Pain     Unspecified diastolic (congestive) heart failure.     PAST SURGICAL HISTORY:  No significant past surgical history.     FAMILY HISTORY:  No pertinent family history in first degree relatives.     No Pertinent Family History in first degree relatives of: .     Social History:  Social History (marital status, living situation, occupation, tobacco use, alcohol and drug use, and sexual history): Lives at [facility] for about a year.  Patient states she has a roommate and states she performs ADLs by herself.  Admits to tobacco use 40 years ago (age [age]) smoking about a pack a week.  Patient states she uses a walker most of the time but can ambulate without one.     Tobacco Screening:  · Core Measure Site	Yes  · Has the patient used tobacco in the past 30 days?	No    Risk Assessment:    Present on Admission:  Deep Venous Thrombosis	no  Pulmonary Embolus	no  Urinary Catheter	no  Central Venous Catheter/PICC Line	no  Surgical Site Incision	no  Pressure Ulcer(s)	yes  Stage 1 sacrum     Heart Failure:  Does this patient have a history of or has been diagnosed with heart failure? yes.     LV Function Assessment (LVS function was evaluated before arrival and/or during hospitalization) unknown.     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 3 milliLiter(s) Nebulizer every 6 hours  aspirin  chewable 81 milliGRAM(s) Oral daily  atorvastatin 80 milliGRAM(s) Oral at bedtime  buDESOnide 160 MICROgram(s)/formoterol 4.5 MICROgram(s) Inhaler 2 Puff(s) Inhalation two times a day  dextrose 5%. 1000 milliLiter(s) (50 mL/Hr) IV Continuous <Continuous>  dextrose 50% Injectable 12.5 Gram(s) IV Push once  dextrose 50% Injectable 25 Gram(s) IV Push once  dextrose 50% Injectable 25 Gram(s) IV Push once  enoxaparin Injectable 40 milliGRAM(s) SubCutaneous daily  escitalopram 10 milliGRAM(s) Oral daily  famotidine    Tablet 20 milliGRAM(s) Oral daily  predniSONE   Tablet 40 milliGRAM(s) Oral daily    MEDICATIONS  (PRN):  dextrose 40% Gel 15 Gram(s) Oral once PRN Blood Glucose LESS THAN 70 milliGRAM(s)/deciliter  glucagon  Injectable 1 milliGRAM(s) IntraMuscular once PRN Glucose LESS THAN 70 milligrams/deciliter         Vitals log        ICU Vital Signs Last 24 Hrs  T(C): 36.9 (09 Jul 2019 13:05), Max: 37 (08 Jul 2019 16:00)  T(F): 98.5 (09 Jul 2019 13:05), Max: 98.6 (08 Jul 2019 16:00)  HR: 83 (09 Jul 2019 13:35) (77 - 96)  BP: 153/80 (09 Jul 2019 13:05) (126/80 - 156/68)  BP(mean): 103 (08 Jul 2019 19:38) (96 - 109)  ABP: --  ABP(mean): --  RR: 19 (09 Jul 2019 13:05) (16 - 26)  SpO2: 96% (09 Jul 2019 13:35) (92% - 100%)           Input and Output:  I&O's Detail    08 Jul 2019 07:01  -  09 Jul 2019 07:00  --------------------------------------------------------  IN:    Oral Fluid: 1300 mL  Total IN: 1300 mL    OUT:  Total OUT: 0 mL    Total NET: 1300 mL          Lab Data                        12.9   8.57  )-----------( 132      ( 09 Jul 2019 08:15 )             39.7     07-09    142  |  104  |  21  ----------------------------<  97  4.5   |  34<H>  |  0.78    Ca    7.9<L>      09 Jul 2019 08:15  Phos  2.2     07-09  Mg     2.3     07-09    TPro  5.3<L>  /  Alb  2.5<L>  /  TBili  0.5  /  DBili  x   /  AST  33  /  ALT  33  /  AlkPhos  83  07-09        non smoker  non drinker  family hx - ashd - father -       Review of Systems	  cough      Objective     Physical Examination    heart s1s2  lung dec BS  abd soft  cn grossly int      Pertinent Lab findings & Imaging      Peter:  NO   Adequate UO     I&O's Detail    08 Jul 2019 07:01  -  09 Jul 2019 07:00  --------------------------------------------------------  IN:    Oral Fluid: 1300 mL  Total IN: 1300 mL    OUT:  Total OUT: 0 mL    Total NET: 1300 mL               Discussed with:     Cultures:	        Radiology

## 2019-07-09 NOTE — PROGRESS NOTE ADULT - PROBLEM SELECTOR PLAN 6
chronic, stable   - Continue home med oral KCl lower ext edema nonpitting R>L  chronic  doppler US negative for DVT  monitor, holding lasix for now

## 2019-07-09 NOTE — PROGRESS NOTE ADULT - PROBLEM SELECTOR PLAN 3
- unspecified severity, unspecified if complicated or persistent    - Continue DuoNeb and Symbicort   - Consider starting Spiriva acute, stable   - Current serum phosphorus 2.2  - Replete with Neutraphos x1  - f/u phos in am

## 2019-07-09 NOTE — PROGRESS NOTE ADULT - SUBJECTIVE AND OBJECTIVE BOX
HPI:  86 y/o F with PMHx of Diastolic HF, bronchitis, and chronic LE edema presented to the [place] [facility] on 07/04/19 BIBEMS from [facility] with shortness of breath and cough.  Limited history as patient was unable to remember getting short of breath or getting to the hospital and admits to poor memory. pt states prior to this episode she was in her normal state of health  Per EMS patient received Lasix and Solumedrol.  Patient states she uses an nebulizer for her asthma when needed.  Denies sick contacts.  Denies Chest pain. Denies new exposure to animal.    In the ED, VS: T 98.0, /85, HR 12, RR30, O2 97 on O2.  CBC with WBC 20.  ABG 7.16/75/172/20 which improved with BIPAP repeat ABG 7.41/36/56/23. Trop 0.066. (05 Jul 2019 01:03)      Interval History/Overnight Events: Patient resting comfortably in bed. Patient endorses continued dry cough.  Patient endorses constipation with bowel movement last night and this morning. Patient endorses SOB when ambulating. Patient denies headache, dizziness, nausea, vomiting, diarrhea, abdominal pain, chest pain.     REVIEW OF SYSTEMS:    CONSTITUTIONAL: No weakness   RESPIRATORY: dry cough   CARDIOVASCULAR: No chest pain   GASTROINTESTINAL: No abdominal, nausea, vomiting, or hematemesis; constipation   NEUROLOGICAL: No dizziness  or weakness  All other review of systems is negative unless indicated above.    VITAL SIGNS:  Vital Signs Last 24 Hrs  T(C): 36.8 (09 Jul 2019 05:05), Max: 37 (08 Jul 2019 16:00)  T(F): 98.3 (09 Jul 2019 05:05), Max: 98.6 (08 Jul 2019 16:00)  HR: 81 (09 Jul 2019 07:34) (78 - 120)  BP: 143/83 (09 Jul 2019 05:05) (113/48 - 187/98)  BP(mean): 103 (08 Jul 2019 19:38) (65 - 131)  RR: 16 (09 Jul 2019 05:05) (16 - 40)  SpO2: 99% (09 Jul 2019 07:34) (91% - 100%)      PHYSICAL EXAM:     GENERAL: no acute distress  RESPIRATORY: B/L middle and lower lobe wheezing    CARDIOVASCULAR: RRR, no murmurs, gallops, rubs  ABDOMINAL: soft, non-tender, non-distended, positive bowel sounds   EXTREMITIES: no clubbing, cyanosis, or edema  NEUROLOGICAL: alert and oriented x 3, non-focal  SKIN: no rashes or lesions   MUSCULOSKELETAL: no gross joint deformity                          12.9   8.57  )-----------( 132      ( 09 Jul 2019 08:15 )             39.7     07-09    142  |  104  |  21  ----------------------------<  97  4.5   |  34<H>  |  0.78    Ca    7.9<L>      09 Jul 2019 08:15  Phos  3.4     07-08  Mg     2.4     07-08    TPro  5.3<L>  /  Alb  2.5<L>  /  TBili  0.5  /  DBili  x   /  AST  33  /  ALT  33  /  AlkPhos  83  07-09      CAPILLARY BLOOD GLUCOSE          MEDICATIONS  (STANDING):  ALBUTerol/ipratropium for Nebulization. 3 milliLiter(s) Nebulizer every 6 hours  aspirin  chewable 81 milliGRAM(s) Oral daily  atorvastatin 80 milliGRAM(s) Oral at bedtime  buDESOnide 160 MICROgram(s)/formoterol 4.5 MICROgram(s) Inhaler 2 Puff(s) Inhalation two times a day  dextrose 5%. 1000 milliLiter(s) (50 mL/Hr) IV Continuous <Continuous>  dextrose 50% Injectable 12.5 Gram(s) IV Push once  dextrose 50% Injectable 25 Gram(s) IV Push once  dextrose 50% Injectable 25 Gram(s) IV Push once  enoxaparin Injectable 40 milliGRAM(s) SubCutaneous daily  escitalopram 10 milliGRAM(s) Oral daily  famotidine    Tablet 20 milliGRAM(s) Oral daily  predniSONE   Tablet 40 milliGRAM(s) Oral daily HPI:  84 y/o F with PMHx of Diastolic HF, bronchitis, and chronic LE edema presented to the [place] [facility] on 07/04/19 BIBEMS from [facility] with shortness of breath and cough.     In the ED, VS: T 98.0, /85, HR 12, RR30, O2 97 on O2.  CBC with WBC 20.  ABG 7.16/75/172/20 which improved with BIPAP repeat ABG 7.41/36/56/23. Trop 0.066. (05 Jul 2019 01:03)      Interval History/Overnight Events: Patient resting comfortably in bed. Patient endorses continued dry cough.  Patient endorses constipation with bowel movement last night and this morning. Patient endorses SOB when ambulating. Patient denies headache, dizziness, nausea, vomiting, diarrhea, abdominal pain, chest pain.     REVIEW OF SYSTEMS:    CONSTITUTIONAL: No weakness   RESPIRATORY: dry cough   CARDIOVASCULAR: No chest pain   GASTROINTESTINAL: No abdominal, nausea, vomiting, or hematemesis; constipation   NEUROLOGICAL: No dizziness  or weakness  All other review of systems is negative unless indicated above.    VITAL SIGNS:  Vital Signs Last 24 Hrs  T(C): 36.8 (09 Jul 2019 05:05), Max: 37 (08 Jul 2019 16:00)  T(F): 98.3 (09 Jul 2019 05:05), Max: 98.6 (08 Jul 2019 16:00)  HR: 81 (09 Jul 2019 07:34) (78 - 120)  BP: 143/83 (09 Jul 2019 05:05) (113/48 - 187/98)  BP(mean): 103 (08 Jul 2019 19:38) (65 - 131)  RR: 16 (09 Jul 2019 05:05) (16 - 40)  SpO2: 99% (09 Jul 2019 07:34) (91% - 100%)      PHYSICAL EXAM:     GENERAL: no acute distress  RESPIRATORY: B/L middle and lower lobe wheezing    CARDIOVASCULAR: RRR, no murmurs, gallops, rubs  ABDOMINAL: soft, non-tender, non-distended   EXTREMITIES: B/L LE edema R > L   NEUROLOGICAL: alert and oriented x 3                             12.9   8.57  )-----------( 132      ( 09 Jul 2019 08:15 )             39.7     07-09    142  |  104  |  21  ----------------------------<  97  4.5   |  34<H>  |  0.78    Ca    7.9<L>      09 Jul 2019 08:15  Phos  3.4     07-08  Mg     2.4     07-08    TPro  5.3<L>  /  Alb  2.5<L>  /  TBili  0.5  /  DBili  x   /  AST  33  /  ALT  33  /  AlkPhos  83  07-09      CAPILLARY BLOOD GLUCOSE          MEDICATIONS  (STANDING):  ALBUTerol/ipratropium for Nebulization. 3 milliLiter(s) Nebulizer every 6 hours  aspirin  chewable 81 milliGRAM(s) Oral daily  atorvastatin 80 milliGRAM(s) Oral at bedtime  buDESOnide 160 MICROgram(s)/formoterol 4.5 MICROgram(s) Inhaler 2 Puff(s) Inhalation two times a day  dextrose 5%. 1000 milliLiter(s) (50 mL/Hr) IV Continuous <Continuous>  dextrose 50% Injectable 12.5 Gram(s) IV Push once  dextrose 50% Injectable 25 Gram(s) IV Push once  dextrose 50% Injectable 25 Gram(s) IV Push once  enoxaparin Injectable 40 milliGRAM(s) SubCutaneous daily  escitalopram 10 milliGRAM(s) Oral daily  famotidine    Tablet 20 milliGRAM(s) Oral daily  predniSONE   Tablet 40 milliGRAM(s) Oral daily HPI:  84 y/o F with PMHx of Diastolic HF, bronchitis, and chronic LE edema presented to the [place] [facility] on 07/04/19 BIBEMS from [facility] with shortness of breath and cough. Limited history as patient was unable to remember getting short of breath or getting to the hospital and admits to poor memory. pt states prior to this episode she was in her normal state of health  Per EMS patient received Lasix and Solumedrol.  Patient states she uses an nebulizer for her asthma when needed.  Denies sick contacts.  Denies Chest pain. Denies new exposure to animal.    In the ED, VS: T 98.0, /85, HR 12, RR30, O2 97 on O2.  CBC with WBC 20.  ABG 7.16/75/172/20 which improved with BIPAP repeat ABG 7.41/36/56/23. Trop 0.066. (05 Jul 2019 01:03)      Interval History/Overnight Events: Patient resting comfortably in bed. Patient endorses continued dry cough.  Patient endorses constipation with bowel movement last night and this morning. Patient endorses SOB when ambulating. Patient denies headache, dizziness, nausea, vomiting, diarrhea, abdominal pain, chest pain.     REVIEW OF SYSTEMS:    CONSTITUTIONAL: No weakness   RESPIRATORY: dry cough   CARDIOVASCULAR: No chest pain   GASTROINTESTINAL: No abdominal, nausea, vomiting, or hematemesis; constipation   NEUROLOGICAL: No dizziness  or weakness  All other review of systems is negative unless indicated above.    VITAL SIGNS:  Vital Signs Last 24 Hrs  T(C): 36.8 (09 Jul 2019 05:05), Max: 37 (08 Jul 2019 16:00)  T(F): 98.3 (09 Jul 2019 05:05), Max: 98.6 (08 Jul 2019 16:00)  HR: 81 (09 Jul 2019 07:34) (78 - 120)  BP: 143/83 (09 Jul 2019 05:05) (113/48 - 187/98)  BP(mean): 103 (08 Jul 2019 19:38) (65 - 131)  RR: 16 (09 Jul 2019 05:05) (16 - 40)  SpO2: 99% (09 Jul 2019 07:34) (91% - 100%)      PHYSICAL EXAM:     GENERAL: no acute distress  RESPIRATORY: B/L middle and lower lobe wheezing    CARDIOVASCULAR: RRR, no murmurs, gallops, rubs  ABDOMINAL: soft, non-tender, non-distended   EXTREMITIES: B/L LE edema R > L   NEUROLOGICAL: alert and oriented x 3                             12.9   8.57  )-----------( 132      ( 09 Jul 2019 08:15 )             39.7     07-09    142  |  104  |  21  ----------------------------<  97  4.5   |  34<H>  |  0.78    Ca    7.9<L>      09 Jul 2019 08:15  Phos  3.4     07-08  Mg     2.4     07-08    TPro  5.3<L>  /  Alb  2.5<L>  /  TBili  0.5  /  DBili  x   /  AST  33  /  ALT  33  /  AlkPhos  83  07-09      CAPILLARY BLOOD GLUCOSE          MEDICATIONS  (STANDING):  ALBUTerol/ipratropium for Nebulization. 3 milliLiter(s) Nebulizer every 6 hours  aspirin  chewable 81 milliGRAM(s) Oral daily  atorvastatin 80 milliGRAM(s) Oral at bedtime  buDESOnide 160 MICROgram(s)/formoterol 4.5 MICROgram(s) Inhaler 2 Puff(s) Inhalation two times a day  dextrose 5%. 1000 milliLiter(s) (50 mL/Hr) IV Continuous <Continuous>  dextrose 50% Injectable 12.5 Gram(s) IV Push once  dextrose 50% Injectable 25 Gram(s) IV Push once  dextrose 50% Injectable 25 Gram(s) IV Push once  enoxaparin Injectable 40 milliGRAM(s) SubCutaneous daily  escitalopram 10 milliGRAM(s) Oral daily  famotidine    Tablet 20 milliGRAM(s) Oral daily  predniSONE   Tablet 40 milliGRAM(s) Oral daily HPI:  86 y/o F with PMHx of Diastolic HF, bronchitis, and chronic LE edema presented to the [place] [facility] on 07/04/19 BIBEMS from [facility] with shortness of breath and cough. Limited history as patient was unable to remember getting short of breath or getting to the hospital and admits to poor memory. pt states prior to this episode she was in her normal state of health  Per EMS patient received Lasix and Solumedrol.  Patient states she uses an nebulizer for her asthma when needed.  Denies sick contacts.  Denies Chest pain. Denies new exposure to animal.    In the ED, VS: T 98.0, /85, HR 12, RR30, O2 97 on O2.  CBC with WBC 20.  ABG 7.16/75/172/20 which improved with BIPAP repeat ABG 7.41/36/56/23. Trop 0.066. (05 Jul 2019 01:03)      Interval History/Overnight Events: Patient resting comfortably in bed. Patient endorses continued dry cough.  Patient endorses constipation with bowel movement last night and this morning. Patient endorses SOB when ambulating to the bathroom. Patient denies headache, dizziness, nausea, vomiting, diarrhea, abdominal pain, chest pain.     REVIEW OF SYSTEMS:    CONSTITUTIONAL: No weakness   RESPIRATORY: dry cough +SOB +wheezing   CARDIOVASCULAR: No chest pain   GASTROINTESTINAL: No abdominal, nausea, vomiting, or hematemesis; constipation   NEUROLOGICAL: No dizziness  or weakness  All other review of systems is negative unless indicated above.    VITAL SIGNS:  Vital Signs Last 24 Hrs  T(C): 36.8 (09 Jul 2019 05:05), Max: 37 (08 Jul 2019 16:00)  T(F): 98.3 (09 Jul 2019 05:05), Max: 98.6 (08 Jul 2019 16:00)  HR: 81 (09 Jul 2019 07:34) (78 - 120)  BP: 143/83 (09 Jul 2019 05:05) (113/48 - 187/98)  BP(mean): 103 (08 Jul 2019 19:38) (65 - 131)  RR: 16 (09 Jul 2019 05:05) (16 - 40)  SpO2: 99% (09 Jul 2019 07:34) (91% - 100%)      PHYSICAL EXAM:     GENERAL: no acute distress  RESPIRATORY: B/L middle and lower lobe insp wheezing with scattered rhonchi     CARDIOVASCULAR: RRR, no murmurs, gallops, rubs  ABDOMINAL: soft, non-tender, non-distended   EXTREMITIES: B/L LE edema R > L nonpitting  NEUROLOGICAL: alert and oriented x 3                             12.9   8.57  )-----------( 132      ( 09 Jul 2019 08:15 )             39.7     07-09    142  |  104  |  21  ----------------------------<  97  4.5   |  34<H>  |  0.78    Ca    7.9<L>      09 Jul 2019 08:15  Phos  3.4     07-08  Mg     2.4     07-08    TPro  5.3<L>  /  Alb  2.5<L>  /  TBili  0.5  /  DBili  x   /  AST  33  /  ALT  33  /  AlkPhos  83  07-09      CAPILLARY BLOOD GLUCOSE          MEDICATIONS  (STANDING):  ALBUTerol/ipratropium for Nebulization. 3 milliLiter(s) Nebulizer every 6 hours  aspirin  chewable 81 milliGRAM(s) Oral daily  atorvastatin 80 milliGRAM(s) Oral at bedtime  buDESOnide 160 MICROgram(s)/formoterol 4.5 MICROgram(s) Inhaler 2 Puff(s) Inhalation two times a day  dextrose 5%. 1000 milliLiter(s) (50 mL/Hr) IV Continuous <Continuous>  dextrose 50% Injectable 12.5 Gram(s) IV Push once  dextrose 50% Injectable 25 Gram(s) IV Push once  dextrose 50% Injectable 25 Gram(s) IV Push once  enoxaparin Injectable 40 milliGRAM(s) SubCutaneous daily  escitalopram 10 milliGRAM(s) Oral daily  famotidine    Tablet 20 milliGRAM(s) Oral daily  predniSONE   Tablet 40 milliGRAM(s) Oral daily

## 2019-07-09 NOTE — PROGRESS NOTE ADULT - PROBLEM SELECTOR PLAN 2
TE Echo (7/6) showed Grade I diastolic dysfunction, calcified trileaflet aortic valve, mitral annular calcification with mild mitral insufficiency. RV enlargement with borderline RV systolic dysfunction, mild Right atrial enlargement. EKG (7/6) showed sinus rhythm with premature atrial complexes and prolonged QT.   - Troponin levels have plateaued, r/o ACS   - likely demand ischemia    - Continue to hold Lasix stable, likely demand ischemia exacerbated by acute respiratory failure.   - TE Echo (7/6) showed Grade I diastolic dysfunction, calcified trileaflet aortic valve, mitral annular calcification with mild mitral insufficiency. RV enlargement with borderline RV systolic dysfunction, mild Right atrial enlargement.    - EKG (7/6) showed sinus rhythm with premature atrial complexes and prolonged QT.   - Troponin I levels have plateaued, r/o ACS   - Current BP: 143/83    - Consider restarting home anti-hypertensive meds (lasix, carvedilol)   - Continue ASA and Lipitor resolved   Possibly exacerbated by status asthmaticus with entero/rhinovirus. Suspected superimposed bacterial pneumonia with Staph Aureus involvement. Abx discontinued    - Current serum bicarb 34, unchanged from yesterday    - s/p intubation, now extubated 2 days prior   - Continue prednisone   - Continue to hold antibiotics

## 2019-07-09 NOTE — PROGRESS NOTE ADULT - NSHPATTENDINGPLANDISCUSS_GEN_ALL_CORE
patient, ICU team
ICU attending, nurse
MICU team, RN - re: tx plan
patient, ICU team
patient, resident, Dr Talbot

## 2019-07-09 NOTE — PROGRESS NOTE ADULT - PROBLEM SELECTOR PLAN 4
-TE Echo (7/6) showed Grade I diastolic dysfunction, calcified trileaflet aortic valve, mitral annular calcification with mild mitral insufficiency. RV enlargement with borderline RV systolic dysfunction, mild Right atrial enlargement. EKG (7/6) showed sinus rhythm with premature atrial complexes and prolonged QT.    - Continue to hold anti-hypertensive meds - TE Echo (7/6) showed Grade I diastolic dysfunction, calcified trileaflet aortic valve, mitral annular calcification with mild mitral insufficiency. RV enlargement with borderline RV systolic dysfunction, mild Right atrial enlargement. EKG (7/6) showed sinus rhythm with premature atrial complexes and prolonged QT. stable, likely demand ischemia exacerbated by acute respiratory failure.   - TE Echo (7/6) showed Grade I diastolic dysfunction, calcified trileaflet aortic valve, mitral annular calcification with mild mitral insufficiency. RV enlargement with borderline RV systolic dysfunction, mild Right atrial enlargement.    - EKG (7/6) showed sinus rhythm with premature atrial complexes and prolonged QT.   - Troponin I levels have plateaued  - Current BP: 143/83 , will holf off on restarting home anti-hypertensive meds (lasix, carvedilol)   - Continue ASA and Lipitor

## 2019-07-09 NOTE — CONSULT NOTE ADULT - PROBLEM SELECTOR RECOMMENDATION 9
on room air  on nebs and steroids  verbal - in full sentences - no resp distress  s/p ICU stay, s/p resp failure  TTE and CXR and LABS reviewed  HOB elev  Oral hygiene  Skin care  Assist with ADL as needed  may benefit from I roger - and peak flow measurement -   family aware  will follow  monitor vs and HD and Sat

## 2019-07-10 VITALS
DIASTOLIC BLOOD PRESSURE: 65 MMHG | TEMPERATURE: 98 F | RESPIRATION RATE: 14 BRPM | OXYGEN SATURATION: 98 % | HEART RATE: 82 BPM | SYSTOLIC BLOOD PRESSURE: 129 MMHG

## 2019-07-10 LAB
ANION GAP SERPL CALC-SCNC: 5 MMOL/L — SIGNIFICANT CHANGE UP (ref 5–17)
BUN SERPL-MCNC: 19 MG/DL — SIGNIFICANT CHANGE UP (ref 7–23)
CALCIUM SERPL-MCNC: 8 MG/DL — LOW (ref 8.5–10.1)
CHLORIDE SERPL-SCNC: 103 MMOL/L — SIGNIFICANT CHANGE UP (ref 96–108)
CO2 SERPL-SCNC: 32 MMOL/L — HIGH (ref 22–31)
CREAT SERPL-MCNC: 0.83 MG/DL — SIGNIFICANT CHANGE UP (ref 0.5–1.3)
CULTURE RESULTS: SIGNIFICANT CHANGE UP
CULTURE RESULTS: SIGNIFICANT CHANGE UP
GLUCOSE SERPL-MCNC: 103 MG/DL — HIGH (ref 70–99)
HCT VFR BLD CALC: 40.3 % — SIGNIFICANT CHANGE UP (ref 34.5–45)
HGB BLD-MCNC: 13.1 G/DL — SIGNIFICANT CHANGE UP (ref 11.5–15.5)
MCHC RBC-ENTMCNC: 29.6 PG — SIGNIFICANT CHANGE UP (ref 27–34)
MCHC RBC-ENTMCNC: 32.5 GM/DL — SIGNIFICANT CHANGE UP (ref 32–36)
MCV RBC AUTO: 91.2 FL — SIGNIFICANT CHANGE UP (ref 80–100)
NRBC # BLD: 0 /100 WBCS — SIGNIFICANT CHANGE UP (ref 0–0)
PHOSPHATE SERPL-MCNC: 2.9 MG/DL — SIGNIFICANT CHANGE UP (ref 2.5–4.5)
PLATELET # BLD AUTO: 146 K/UL — LOW (ref 150–400)
POTASSIUM SERPL-MCNC: 4.1 MMOL/L — SIGNIFICANT CHANGE UP (ref 3.5–5.3)
POTASSIUM SERPL-SCNC: 4.1 MMOL/L — SIGNIFICANT CHANGE UP (ref 3.5–5.3)
RBC # BLD: 4.42 M/UL — SIGNIFICANT CHANGE UP (ref 3.8–5.2)
RBC # FLD: 13.3 % — SIGNIFICANT CHANGE UP (ref 10.3–14.5)
SODIUM SERPL-SCNC: 140 MMOL/L — SIGNIFICANT CHANGE UP (ref 135–145)
SPECIMEN SOURCE: SIGNIFICANT CHANGE UP
SPECIMEN SOURCE: SIGNIFICANT CHANGE UP
WBC # BLD: 8.21 K/UL — SIGNIFICANT CHANGE UP (ref 3.8–10.5)
WBC # FLD AUTO: 8.21 K/UL — SIGNIFICANT CHANGE UP (ref 3.8–10.5)

## 2019-07-10 PROCEDURE — 87899 AGENT NOS ASSAY W/OPTIC: CPT

## 2019-07-10 PROCEDURE — 87070 CULTURE OTHR SPECIMN AEROBIC: CPT

## 2019-07-10 PROCEDURE — 84484 ASSAY OF TROPONIN QUANT: CPT

## 2019-07-10 PROCEDURE — 93970 EXTREMITY STUDY: CPT

## 2019-07-10 PROCEDURE — 83036 HEMOGLOBIN GLYCOSYLATED A1C: CPT

## 2019-07-10 PROCEDURE — 94799 UNLISTED PULMONARY SVC/PX: CPT

## 2019-07-10 PROCEDURE — 97110 THERAPEUTIC EXERCISES: CPT

## 2019-07-10 PROCEDURE — 87449 NOS EACH ORGANISM AG IA: CPT

## 2019-07-10 PROCEDURE — 94660 CPAP INITIATION&MGMT: CPT

## 2019-07-10 PROCEDURE — 97116 GAIT TRAINING THERAPY: CPT

## 2019-07-10 PROCEDURE — 94002 VENT MGMT INPAT INIT DAY: CPT

## 2019-07-10 PROCEDURE — 36600 WITHDRAWAL OF ARTERIAL BLOOD: CPT

## 2019-07-10 PROCEDURE — 80053 COMPREHEN METABOLIC PANEL: CPT

## 2019-07-10 PROCEDURE — 94003 VENT MGMT INPAT SUBQ DAY: CPT

## 2019-07-10 PROCEDURE — 82803 BLOOD GASES ANY COMBINATION: CPT

## 2019-07-10 PROCEDURE — 99285 EMERGENCY DEPT VISIT HI MDM: CPT | Mod: 25

## 2019-07-10 PROCEDURE — 96374 THER/PROPH/DIAG INJ IV PUSH: CPT

## 2019-07-10 PROCEDURE — 99239 HOSP IP/OBS DSCHRG MGMT >30: CPT

## 2019-07-10 PROCEDURE — 87798 DETECT AGENT NOS DNA AMP: CPT

## 2019-07-10 PROCEDURE — 80048 BASIC METABOLIC PNL TOTAL CA: CPT

## 2019-07-10 PROCEDURE — 97163 PT EVAL HIGH COMPLEX 45 MIN: CPT

## 2019-07-10 PROCEDURE — 87486 CHLMYD PNEUM DNA AMP PROBE: CPT

## 2019-07-10 PROCEDURE — 83880 ASSAY OF NATRIURETIC PEPTIDE: CPT

## 2019-07-10 PROCEDURE — 94640 AIRWAY INHALATION TREATMENT: CPT

## 2019-07-10 PROCEDURE — 93306 TTE W/DOPPLER COMPLETE: CPT

## 2019-07-10 PROCEDURE — 84145 PROCALCITONIN (PCT): CPT

## 2019-07-10 PROCEDURE — 87633 RESP VIRUS 12-25 TARGETS: CPT

## 2019-07-10 PROCEDURE — 71045 X-RAY EXAM CHEST 1 VIEW: CPT

## 2019-07-10 PROCEDURE — 99231 SBSQ HOSP IP/OBS SF/LOW 25: CPT

## 2019-07-10 PROCEDURE — 94760 N-INVAS EAR/PLS OXIMETRY 1: CPT

## 2019-07-10 PROCEDURE — 85730 THROMBOPLASTIN TIME PARTIAL: CPT

## 2019-07-10 PROCEDURE — 87641 MR-STAPH DNA AMP PROBE: CPT

## 2019-07-10 PROCEDURE — 82550 ASSAY OF CK (CPK): CPT

## 2019-07-10 PROCEDURE — 99221 1ST HOSP IP/OBS SF/LOW 40: CPT

## 2019-07-10 PROCEDURE — 87640 STAPH A DNA AMP PROBE: CPT

## 2019-07-10 PROCEDURE — 83605 ASSAY OF LACTIC ACID: CPT

## 2019-07-10 PROCEDURE — 93005 ELECTROCARDIOGRAM TRACING: CPT

## 2019-07-10 PROCEDURE — 83735 ASSAY OF MAGNESIUM: CPT

## 2019-07-10 PROCEDURE — 36415 COLL VENOUS BLD VENIPUNCTURE: CPT

## 2019-07-10 PROCEDURE — 84100 ASSAY OF PHOSPHORUS: CPT

## 2019-07-10 PROCEDURE — 82962 GLUCOSE BLOOD TEST: CPT

## 2019-07-10 PROCEDURE — 87040 BLOOD CULTURE FOR BACTERIA: CPT

## 2019-07-10 PROCEDURE — 87581 M.PNEUMON DNA AMP PROBE: CPT

## 2019-07-10 PROCEDURE — 85027 COMPLETE CBC AUTOMATED: CPT

## 2019-07-10 PROCEDURE — 82553 CREATINE MB FRACTION: CPT

## 2019-07-10 PROCEDURE — 99232 SBSQ HOSP IP/OBS MODERATE 35: CPT

## 2019-07-10 PROCEDURE — 85610 PROTHROMBIN TIME: CPT

## 2019-07-10 RX ORDER — MULTIVIT-MIN/FERROUS GLUCONATE 9 MG/15 ML
1 LIQUID (ML) ORAL
Qty: 0 | Refills: 0 | DISCHARGE
Start: 2019-07-10

## 2019-07-10 RX ORDER — ESCITALOPRAM OXALATE 10 MG/1
1 TABLET, FILM COATED ORAL
Qty: 0 | Refills: 0 | DISCHARGE
Start: 2019-07-10

## 2019-07-10 RX ORDER — ATORVASTATIN CALCIUM 80 MG/1
1 TABLET, FILM COATED ORAL
Qty: 0 | Refills: 0 | DISCHARGE
Start: 2019-07-10

## 2019-07-10 RX ORDER — BUDESONIDE AND FORMOTEROL FUMARATE DIHYDRATE 160; 4.5 UG/1; UG/1
1 AEROSOL RESPIRATORY (INHALATION)
Qty: 0 | Refills: 0 | DISCHARGE
Start: 2019-07-10

## 2019-07-10 RX ORDER — ATORVASTATIN CALCIUM 80 MG/1
40 TABLET, FILM COATED ORAL AT BEDTIME
Refills: 0 | Status: DISCONTINUED | OUTPATIENT
Start: 2019-07-10 | End: 2019-07-10

## 2019-07-10 RX ORDER — ASPIRIN/CALCIUM CARB/MAGNESIUM 324 MG
1 TABLET ORAL
Qty: 0 | Refills: 0 | DISCHARGE
Start: 2019-07-10

## 2019-07-10 RX ADMIN — FAMOTIDINE 20 MILLIGRAM(S): 10 INJECTION INTRAVENOUS at 11:54

## 2019-07-10 RX ADMIN — Medication 40 MILLIGRAM(S): at 05:19

## 2019-07-10 RX ADMIN — Medication 81 MILLIGRAM(S): at 11:54

## 2019-07-10 RX ADMIN — BUDESONIDE AND FORMOTEROL FUMARATE DIHYDRATE 2 PUFF(S): 160; 4.5 AEROSOL RESPIRATORY (INHALATION) at 06:27

## 2019-07-10 RX ADMIN — Medication 3 MILLILITER(S): at 08:02

## 2019-07-10 RX ADMIN — ENOXAPARIN SODIUM 40 MILLIGRAM(S): 100 INJECTION SUBCUTANEOUS at 11:55

## 2019-07-10 RX ADMIN — ESCITALOPRAM OXALATE 10 MILLIGRAM(S): 10 TABLET, FILM COATED ORAL at 11:54

## 2019-07-10 RX ADMIN — Medication 3 MILLILITER(S): at 13:14

## 2019-07-10 NOTE — PROVIDER CONTACT NOTE (OTHER) - ASSESSMENT
denies painful urination, pressure, burning  pt does not recall if this has happened before  VSS  no distress noted

## 2019-07-10 NOTE — PROGRESS NOTE ADULT - PROBLEM SELECTOR PLAN 1
seen and examined, vs and meds reviewed, labs reviewed, on room air, verbal  on nebs and steroids  verbal - in full sentences - no resp distress  s/p ICU stay, s/p resp failure  TTE and CXR and LABS reviewed  HOB elev  Oral hygiene  Skin care  Assist with ADL as needed  may benefit from I roger - and peak flow measurement -   family aware  will follow  monitor vs and HD and Sat.

## 2019-07-10 NOTE — PROGRESS NOTE ADULT - SUBJECTIVE AND OBJECTIVE BOX
Date/Time Patient Seen:  		  Referring MD:   Data Reviewed	       Patient is a 85y old  Female who presents with a chief complaint of SOB (09 Jul 2019 14:17)      Subjective/HPI     PAST MEDICAL & SURGICAL HISTORY:  Asthma  Asthma  Asthmatic bronchitis , chronic  Pain  NSTEMI (non-ST elevated myocardial infarction)  Chronic embolism and thrombosis of unspecified deep veins of right lower extremity  Anxiety disorder  Major depressive disorder  Bronchitis  Cognitive communication deficit  Muscle weakness (generalized)  Insomnia  Hypokalemia  Edema  Essential (primary) hypertension  Unspecified diastolic (congestive) heart failure  No significant past surgical history        Medication list         MEDICATIONS  (STANDING):  ALBUTerol/ipratropium for Nebulization. 3 milliLiter(s) Nebulizer every 6 hours  aspirin  chewable 81 milliGRAM(s) Oral daily  atorvastatin 80 milliGRAM(s) Oral at bedtime  buDESOnide 160 MICROgram(s)/formoterol 4.5 MICROgram(s) Inhaler 2 Puff(s) Inhalation two times a day  dextrose 5%. 1000 milliLiter(s) (50 mL/Hr) IV Continuous <Continuous>  dextrose 50% Injectable 12.5 Gram(s) IV Push once  dextrose 50% Injectable 25 Gram(s) IV Push once  dextrose 50% Injectable 25 Gram(s) IV Push once  enoxaparin Injectable 40 milliGRAM(s) SubCutaneous daily  escitalopram 10 milliGRAM(s) Oral daily  famotidine    Tablet 20 milliGRAM(s) Oral daily  predniSONE   Tablet 40 milliGRAM(s) Oral daily    MEDICATIONS  (PRN):  dextrose 40% Gel 15 Gram(s) Oral once PRN Blood Glucose LESS THAN 70 milliGRAM(s)/deciliter  glucagon  Injectable 1 milliGRAM(s) IntraMuscular once PRN Glucose LESS THAN 70 milligrams/deciliter         Vitals log        ICU Vital Signs Last 24 Hrs  T(C): 36.7 (10 Jul 2019 05:06), Max: 37 (09 Jul 2019 21:58)  T(F): 98.1 (10 Jul 2019 05:06), Max: 98.6 (09 Jul 2019 21:58)  HR: 82 (10 Jul 2019 05:06) (77 - 87)  BP: 128/79 (10 Jul 2019 05:06) (128/79 - 153/80)  BP(mean): 102 (09 Jul 2019 21:58) (102 - 102)  ABP: --  ABP(mean): --  RR: 18 (10 Jul 2019 05:06) (18 - 20)  SpO2: 96% (10 Jul 2019 05:06) (96% - 99%)           Input and Output:  I&O's Detail    08 Jul 2019 07:01  -  09 Jul 2019 07:00  --------------------------------------------------------  IN:    Oral Fluid: 1300 mL  Total IN: 1300 mL    OUT:  Total OUT: 0 mL    Total NET: 1300 mL          Lab Data                        12.9   8.57  )-----------( 132      ( 09 Jul 2019 08:15 )             39.7     07-09    142  |  104  |  21  ----------------------------<  97  4.5   |  34<H>  |  0.78    Ca    7.9<L>      09 Jul 2019 08:15  Phos  2.2     07-09  Mg     2.3     07-09    TPro  5.3<L>  /  Alb  2.5<L>  /  TBili  0.5  /  DBili  x   /  AST  33  /  ALT  33  /  AlkPhos  83  07-09            Review of Systems	      Objective     Physical Examination    on room air  heart s1s2  lung dec BS  abd soft  verbal      Pertinent Lab findings & Imaging      Peter:  NO   Adequate UO     I&O's Detail    08 Jul 2019 07:01  -  09 Jul 2019 07:00  --------------------------------------------------------  IN:    Oral Fluid: 1300 mL  Total IN: 1300 mL    OUT:  Total OUT: 0 mL    Total NET: 1300 mL               Discussed with:     Cultures:	        Radiology

## 2019-07-10 NOTE — DISCHARGE NOTE PROVIDER - NSDCCPCAREPLAN_GEN_ALL_CORE_FT
PRINCIPAL DISCHARGE DIAGNOSIS  Diagnosis: Acute respiratory failure with hypercapnia  Assessment and Plan of Treatment:       SECONDARY DISCHARGE DIAGNOSES  Diagnosis: Asthma  Assessment and Plan of Treatment: Asthma PRINCIPAL DISCHARGE DIAGNOSIS  Diagnosis: Acute respiratory failure with hypercapnia  Assessment and Plan of Treatment: You had acute respiratory failure due to a severe asthma exacerbation.  Continue your home medications as listed  continue steroids as prescribed for 12 days.  follow up with your Primary doctor in 1 week.      SECONDARY DISCHARGE DIAGNOSES  Diagnosis: Asthma  Assessment and Plan of Treatment: continue inhalers as directed  continue steroids as directed      Diagnosis: Lower extremity edema  Assessment and Plan of Treatment: Lower extremity edema - hold Lasix for now  If lower extemity edema worsens please take half the regular dose of your lasix and follow up with your primary doctor.    Diagnosis: Anxiety  Assessment and Plan of Treatment: Anxiety- continue lexapro

## 2019-07-10 NOTE — DISCHARGE NOTE NURSING/CASE MANAGEMENT/SOCIAL WORK - NSDCDPATPORTLINK_GEN_ALL_CORE
You can access the [facility] Patient Portal, offered by [facility], by registering with the following website: [email or web address]

## 2019-07-10 NOTE — PROGRESS NOTE ADULT - ATTENDING COMMENTS
86 yo F with Hx asthma, anxiety, previous intubation for asthma exacerbation, presenting with acute hypercapnic respiratory failure likely from status asthmaticus in setting of viral URI.  Today marked improvement.      --precedex for sedation while vent weaning  lexapro for anxiety  --s/p NSTEMI, demand ischemia in setting of critical illness  continue ASA, statin  can add BB when BP improves  --acute hypercapnic respiratory failure from status asthmaticus in setting of rhino/enterovirus  passed weaning trial and successfully extubated today  continue solumedrol 40 q12h, bronchodilators q6h  --normal renal function  --doubt HCAP, Cx remain negative  will d/c levaquin  --hyperglycemia due to steroids, continue insulin coverage scale  --discussed plan with pt's daughter  --pt critically ill. CC time 45min
84 yo F with Hx asthma, anxiety, previous intubation for asthma exacerbation, presenting with acute hypercapnic respiratory failure likely from status asthmaticus in setting of viral URI.  Today marked improvement.      --continue propofol for sedation  lexapro for anxiety  --NSTEMI, likely demand ischemia in setting of critical illness  continue ASA, statin  no BB as she is requiring low dose of levophed likely due to high sedation requirements, can likely wean off today  --acute hypercapnic respiratory failure from status asthmaticus in setting of rhino/enterovirus  decrease solumedrol to 40 q12h  continue bronchodilators q6h  PS trial today  --normal renal function  --tolerating TF  --possible HCAP, d/c vanc as MRSA swab negative  continue levaquin but will likely d/c if Cx remain negative  --hyperglycemia due to steroids, start insulin coverage scale  --discussed plan extensively with pt's daughter  --pt critically ill. CC time 45min
Chart reviewed    Patient seen and examined    Agree with plan as outlined above
I personally saw and examined the patient in detail.  I have spoken to the above provider regarding the assessment and plan of care.  I reviewed the above assessment and plan of care, and agree.  I have made changes in the body of the note where appropriate.
84 yo F with Hx asthma, anxiety, previous intubation for asthma exacerbation, presenting with acute hypercapnic respiratory failure likely from status asthmaticus in setting of viral URI.  Now respiratory failure resolved and stable from hemodynamic and respiratory standpoint.    --lexapro for anxiety  --s/p NSTEMI, demand ischemia in setting of critical illness  continue ASA, statin  can add BB when BP improves  --asthma exacerbation  change solumedrol to prednisone, bronchodilators q6h  start symbicort   --normal renal function  --PO diet  --monitor off Abx  --hyperglycemia resolved, d/c insulin coverage scale  --discussed plan with pt's daughter  --stable for floor
86 yo F with Hx asthma, anxiety, previous intubation for asthma exacerbation, presenting with acute hypercapnic respiratory failure likely from status asthmaticus in setting of viral URI.  Less suspicious for pneumonia, pulm edema, or PE given clinical picture and imaging.      --pt initially on precedex for anxiety  but required escalation to max dose and remained agitated, dyspneic on BiPAP and was intubated  now on propofol   per family has severe anxiety and lexapro had been stopped at rehab, will restart  --NSTEMI, likely demand ischemia  continue ASA, start statin  no further heparin gtt  troponin now downtrending  requiring low dose of levophed 0.1mcg after intubation likely due to high sedation requirements  --acute hypercapnic respiratory failure from status asthmaticus in setting of rhino/enterovirus  no on vent bronchospasm seems to have improved  initially Ppeak 40s with plateau 19, this afternoon Ppeak 20s  now that improved decrease solumedrol to 40 q12h  continue bronchodilators  --normal renal function  --start TF after intubation  --given worsening overnight, empiric vanc, levaquin started empirically for HCAP.  Will continue this pending Cx data.  If negative will likely d/c  --hyperglycemia due to steroids, start insulin coverage scal  --discussed plan extensively with pt's 2 daughters  --pt critically ill. CC time 60min
Agree with exam and plan as above.

## 2019-07-10 NOTE — DISCHARGE NOTE PROVIDER - NSDCFUADDINST_GEN_ALL_CORE_FT
Please call to schedule your follow up appointments with your doctors (primary care doctor, cardiologist)  Please take your medications as detailed in your medication reconciliation  Please return to the ED for worsening of your medical condition

## 2019-07-10 NOTE — PROGRESS NOTE ADULT - SUBJECTIVE AND OBJECTIVE BOX
[facility] -- Modesto Moreno, Marco A Roy, Ramon Whipple Savella  Office # [phone]    Follow Up:  Respiratory Failure; Elevated troponins    Subjective/Observations: Seen laying in bed, comfortable on RA.  However, with chronic productive cough.  Denies SOB, LAO, or orthopnea.  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 3 milliLiter(s) Nebulizer every 6 hours  aspirin  chewable 81 milliGRAM(s) Oral daily  atorvastatin 80 milliGRAM(s) Oral at bedtime  buDESOnide 160 MICROgram(s)/formoterol 4.5 MICROgram(s) Inhaler 2 Puff(s) Inhalation two times a day  dextrose 5%. 1000 milliLiter(s) (50 mL/Hr) IV Continuous <Continuous>  dextrose 50% Injectable 12.5 Gram(s) IV Push once  dextrose 50% Injectable 25 Gram(s) IV Push once  dextrose 50% Injectable 25 Gram(s) IV Push once  enoxaparin Injectable 40 milliGRAM(s) SubCutaneous daily  escitalopram 10 milliGRAM(s) Oral daily  famotidine    Tablet 20 milliGRAM(s) Oral daily  predniSONE   Tablet 40 milliGRAM(s) Oral daily    MEDICATIONS  (PRN):  dextrose 40% Gel 15 Gram(s) Oral once PRN Blood Glucose LESS THAN 70 milliGRAM(s)/deciliter  glucagon  Injectable 1 milliGRAM(s) IntraMuscular once PRN Glucose LESS THAN 70 milligrams/deciliter    Allergies    penicillin (Unknown)    Intolerances    Vital Signs Last 24 Hrs  T(C): 36.7 (10 Jul 2019 05:06), Max: 37 (09 Jul 2019 21:58)  T(F): 98.1 (10 Jul 2019 05:06), Max: 98.6 (09 Jul 2019 21:58)  HR: 82 (10 Jul 2019 05:06) (77 - 87)  BP: 128/79 (10 Jul 2019 05:06) (128/79 - 153/80)  BP(mean): 102 (09 Jul 2019 21:58) (102 - 102)  RR: 18 (10 Jul 2019 05:06) (18 - 20)  SpO2: 96% (10 Jul 2019 05:06) (96% - 98%)  I&O's Summary      PHYSICAL EXAM:  TELE: NSR  Constitutional: NAD, awake and alert, well-developed  HEENT: Moist Mucous Membranes, Anicteric  Pulmonary: Non-labored, breath sounds are diminished bilaterally, No wheezing, rales. + rhonchi R>L  Cardiovascular: Regular, S1 and S2, No murmurs, rubs, gallops or clicks  Gastrointestinal: Bowel Sounds present, soft, nontender.   Lymph: Trace pedal edema. No lymphadenopathy.  Skin: No visible rashes or ulcers.  Psych:  Mood & affect appropriate                     13.1   8.21  )-----------( 146      ( 10 Jul 2019 06:31 )             40.3                         12.9   8.57  )-----------( 132      ( 09 Jul 2019 08:15 )             39.7                         12.4   8.58  )-----------( 118      ( 08 Jul 2019 05:29 )             37.8     10 Jul 2019 06:31    140    |  103    |  19     ----------------------------<  103    4.1     |  32     |  0.83   09 Jul 2019 08:15    142    |  104    |  21     ----------------------------<  97     4.5     |  34     |  0.78   08 Jul 2019 05:29    143    |  106    |  25     ----------------------------<  122    5.1     |  34     |  0.84     Ca    8.0        10 Jul 2019 06:31  Ca    7.9        09 Jul 2019 08:15  Ca    7.9        08 Jul 2019 05:29  Phos  2.9       10 Jul 2019 06:31  Phos  2.2       09 Jul 2019 08:15  Phos  3.4       08 Jul 2019 05:29  Mg     2.3       09 Jul 2019 08:15  Mg     2.4       08 Jul 2019 05:29    TPro  5.3    /  Alb  2.5    /  TBili  0.5    /  DBili  x      /  AST  33     /  ALT  33     /  AlkPhos  83     09 Jul 2019 08:15  TPro  5.2    /  Alb  2.4    /  TBili  0.3    /  DBili  x      /  AST  20     /  ALT  27     /  AlkPhos  80     08 Jul 2019 05:29    < from: TTE Echo Doppler w/o Cont (07.06.19 @ 14:24) >     EXAM:  ECHO TTE WO CON COMP W DOPPLR      PROCEDURE DATE:  07/06/2019      INTERPRETATION:  Ordering Physician: RAUL RUIZ [id number]    Indication: Abnormal EKG  Technologist Initials: AS  Study Quality: Technically difficult and limited   A complete echocardiographic study was performed utilizing standard   protocol including spectral and color Doppler in all echocardiographic   windows.    Height: 165 cm  Weight: 68 kg  BSA: 1.75 sq m  Blood Pressure: 108/50    MEASUREMENTS  IVS: 1.2cm  PWT: 1.2cm  LA: 2.4cm  AO: 3.1cm  LVIDd: 4.1cm  LVIDs: 2.5cm    RVSP: 40mmHg    FINDINGS  Left Ventricle: The endocardium is not well visualized. Grossly, normal   left ventricular systolic function.  Aortic Valve: Calcified trileafletaortic valve. Minimal aortic   insufficiency. Peak transaortic gradient equals 16 mmHg, and mean   transaortic gradient equals 9 mm Hg, consistent with mild aortic stenosis.  Mitral Valve: Mitral annular calcification and calcified mitral valve   leaflets. Mild mitral insufficiency  Tricuspid Valve: Normal tricuspid valve. Mild tricuspid insufficiency.  Pulmonic Valve: Not well-visualized  Left Atrium: Normal  Right Ventricle: Right ventricular enlargement with borderline right   ventricular systolic function.  Right Atrium: Mildly enlarged  Diastolic Function: Grade 1 diastolic dysfunction  Pericardium/Pleura: Normal pericardium with no pericardial effusion.    CONCLUSIONS:  Technically difficult study.  1. The endocardium is not well visualized. Grossly, normal left   ventricular systolic function.  2.Calcified trileaflet aortic valve. Minimal aortic insufficiency. Peak   transaortic gradient equals 16 mmHg, and mean transaortic gradient equals   9 mm Hg, consistent with mild aortic stenosis.  3. Mitral annular calcification and calcified mitral valve leaflets with   mild mitral insufficiency.  4. Normal left atrium.  5. Right ventricular enlargement with borderline right ventricular   systolic function. Mild right atrial enlargement.  6. Grade 1 diastolic dysfunction.    MABLE KIM M.D., ATTENDING CARDIOLOGIST  This document has been electronically signed. Jul 7 2019  8:48AM     < end of copied text >    < from: Xray Chest 1 View- PORTABLE-Urgent (07.05.19 @ 14:14) >    EXAM:  XR CHEST PORTABLE URGENT 1V                          PROCEDURE DATE:  07/05/2019      INTERPRETATION:  Clinical information: Endotracheal tube placement.    Technique: Frontal view of the chest.    Comparison: Prior chest x-ray examination from 7/5/2019.    Findings: The patient is status post endotracheal tube placement in good   position. There is an NG Tube with its tip projecting below the   diaphragm. The patient is rotated to the left.    The lungs are clear. The heart size is normal. Multilevel degenerative   changes are noted within the imaged potions of the spine.    IMPRESSION: Interval intubation and placement of an NG tube.    No other interval changes.    JALEEL PAZ M.D., ATTENDING RADIOLOGIST  This document has been electronically signed. Jul 5 2019  2:24PM      < end of copied text >    < from: 12 Lead ECG (07.06.19 @ 11:52) >    Ventricular Rate 69 BPM    Atrial Rate 69 BPM    P-R Interval 174 ms    QRS Duration 82 ms    Q-T Interval 458 ms    QTC Calculation(Bezet) 490 ms    R Axis 59 degrees    T Axis 74 degrees    Diagnosis Line Sinus rhythm with premature atrial complexes  Prolonged QT  Confirmed by MABLE WHIPPLE ([id number]) on 7/7/2019 8:17:13 AM    < end of copied text >

## 2019-07-10 NOTE — DISCHARGE NOTE PROVIDER - HOSPITAL COURSE
86 y/o F with PMHx of Diastolic HF, bronchitis, and chronic LE edema presented with shortness of breath and cough, admitted for acute respiratory failure with hypercapnia, intubated in ICU now extubated 2 days prior, transferred to floors         Problem/Plan - 1:    ·  Problem: Asthma.  Plan: Acute asthma exac 2/2 viral inf    - +wheezing and rhonchi on exam today    - will obtain repeat CXR    - consult Pulm Dr Talbot    - Continue DuoNeb and Symbicort     - Consider starting Spiriva pending pulm recs    - continue prednisone    - peak flow daily.          Problem/Plan - 2:    ·  Problem: Acute respiratory failure with hypercapnia.  Plan: resolved     Possibly exacerbated by status asthmaticus with entero/rhinovirus. Suspected superimposed bacterial pneumonia with Staph Aureus involvement. Abx discontinued      - Current serum bicarb 34, unchanged from yesterday      - s/p intubation, now extubated 2 days prior     - Continue prednisone     - Continue to hold antibiotics.          Problem/Plan - 3:    ·  Problem: Hypophosphatemia.  Plan: acute, stable     - Current serum phosphorus 2.2    - Replete with Neutraphos x1    - f/u phos in am.          Problem/Plan - 4:    ·  Problem: NSTEMI (non-ST elevated myocardial infarction).  Plan: stable, likely demand ischemia exacerbated by acute respiratory failure.     - TE Echo (7/6) showed Grade I diastolic dysfunction, calcified trileaflet aortic valve, mitral annular calcification with mild mitral insufficiency. RV enlargement with borderline RV systolic dysfunction, mild Right atrial enlargement.      - EKG (7/6) showed sinus rhythm with premature atrial complexes and prolonged QT.     - Troponin I levels have plateaued    - Current BP: 143/83 , will holf off on restarting home anti-hypertensive meds (lasix, carvedilol)     - Continue ASA and Lipitor.          Problem/Plan - 5:    ·  Problem: Diastolic HF (heart failure).  Plan: chronic, compensated     - no signs of fluid overload at this time    - TE Echo (7/6) showed Grade I diastolic dysfunction, calcified trileaflet aortic valve, mitral annular calcification with mild mitral insufficiency. RV enlargement with borderline RV systolic dysfunction, mild Right atrial enlargement.     - EKG (7/6) showed sinus rhythm with premature atrial complexes and prolonged QT.          Problem/Plan - 6:    Problem: Lower extremity edema. Plan: lower ext edema nonpitting R>L    chronic    doppler US negative for DVT    monitor, holding lasix for now.         Problem/Plan - 7:    ·  Problem: Hypokalemia.  Plan: chronic, stable     - Continue home med oral KCl.          Problem/Plan - 8:    ·  Problem: Anxiety.  Plan: chronic, stable     - continue Lexapro.          Problem/Plan - 9:    ·  Problem: DVT prophylaxis.  Plan: - Continue Lovenox. 86 y/o F with PMHx of Diastolic HF, bronchitis, and chronic LE edema presented with shortness of breath and cough, admitted for acute respiratory failure with hypercapnia. Pt was placed on bipap and transferred to ICU. Pt was intubated in the ICU after which she was extubated and transferred to floors. During her ICU stay pt required vasopressors due to high sedation requirements. Symptoms attributed to Acute asthma exac 2/2 viral inf. Was treated with steroids, duonebs and symbicort. Pulm Dr Talbot was consulted. Pt found to have hypophosphatemia repleted with Neutraphos. Pt had some Troponin elevations likely demand ischemia exacerbated by acute respiratory failure. Cardiology was consulted and recs appreciated. TTE Echo (7/6) showed Grade I diastolic dysfunction, calcified trileaflet aortic valve, mitral annular calcification with mild mitral insufficiency. RV enlargement with borderline RV systolic dysfunction, mild Right atrial enlargement.  EKG (7/6) showed sinus rhythm with premature atrial complexes and prolonged QT. Pts home antihypertensives were held due to hypotension and pts bp remained stable off lasix and carvedilol. Lasix and Carvedilol discontinued. Pt has chronic Lower extremity edema. doppler US negative for DVT.        Patient improved clinically throughout hospital course. Patient seen and examined on day of discharge. In NAD, out of bed to chair sitting comfortably, speaking in full sentences.        Vital Signs Last 24 Hrs    T(C): 36.7 (10 Jul 2019 05:06), Max: 37 (09 Jul 2019 21:58)    T(F): 98.1 (10 Jul 2019 05:06), Max: 98.6 (09 Jul 2019 21:58)    HR: 85 (10 Jul 2019 08:02) (77 - 87)    BP: 128/79 (10 Jul 2019 05:06) (128/79 - 153/80)    BP(mean): 102 (09 Jul 2019 21:58) (102 - 102)    RR: 18 (10 Jul 2019 05:06) (18 - 20)    SpO2: 97% (10 Jul 2019 08:02) (96% - 98%)        Physical Exam:    General: in no acute distress    HEENT: NCAT, PERRLA, EOMI bl, moist mucous membranes     Neurology: A&Ox3, nonfocal, CN II-XII grossly intact    Respiratory: CTA B/L with mild scattered rhonchi in LAURITA    CV: RRR, S1/S2 present, no murmurs, rubs, or gallops    Abdominal: Soft, nontender, non-distended, normoactive bowel sounds    Extremities: multiple ecchymotics lesions on upper ext, lower ext edema nonpitting R>L        Patient is medically stable for discharge to [facility] with outpatient follow up. 84 y/o F with PMHx of Diastolic HF, bronchitis, and chronic LE edema presented with shortness of breath and cough, admitted for acute respiratory failure with hypercapnia. Pt was placed on bipap and transferred to ICU. Pt was intubated in the ICU after which she was extubated and transferred to floors. During her ICU stay pt required vasopressors due to high sedation requirements. Symptoms attributed to Acute asthma exac 2/2 viral inf. Was treated with steroids, duonebs and symbicort. Pulm Dr Talbot was consulted. Pt found to have hypophosphatemia repleted with Neutraphos. Pt had some Troponin elevations likely demand ischemia exacerbated by acute respiratory failure. Cardiology was consulted and recs appreciated. TTE Echo (7/6) showed Grade I diastolic dysfunction, calcified trileaflet aortic valve, mitral annular calcification with mild mitral insufficiency. RV enlargement with borderline RV systolic dysfunction, mild Right atrial enlargement.  EKG (7/6) showed sinus rhythm with premature atrial complexes and prolonged QT. Pt's home antihypertensives were held due to hypotension and pts bp remained stable off lasix and carvedilol. Lasix and Carvedilol discontinued for now, will consider adding back coreg in outpatient setting but for now, suggest holding 2/2 hypotension. Pt has chronic Lower extremity edema. doppler US negative for DVT. PT eval rec dispo back to         Patient improved clinically throughout hospital course. Patient seen and examined on day of discharge. In NAD, out of bed to chair sitting comfortably, speaking in full sentences.         Vital Signs Last 24 Hrs    T(C): 36.7 (10 Jul 2019 05:06), Max: 37 (09 Jul 2019 21:58)    T(F): 98.1 (10 Jul 2019 05:06), Max: 98.6 (09 Jul 2019 21:58)    HR: 85 (10 Jul 2019 08:02) (77 - 87)    BP: 128/79 (10 Jul 2019 05:06) (128/79 - 153/80)    BP(mean): 102 (09 Jul 2019 21:58) (102 - 102)    RR: 18 (10 Jul 2019 05:06) (18 - 20)    SpO2: 97% (10 Jul 2019 08:02) (96% - 98%)        Physical Exam:    General: in no acute distress    HEENT: NCAT, PERRLA, EOMI bl, moist mucous membranes     Neurology: A&Ox3, nonfocal, CN II-XII grossly intact    Respiratory: CTA B/L with mild scattered rhonchi in LAURITA    CV: RRR, S1/S2 present, no murmurs, rubs, or gallops    Abdominal: Soft, nontender, non-distended, normoactive bowel sounds    Extremities: multiple ecchymotics lesions on upper ext, lower ext edema nonpitting R>L    Psych: mood is good, affect is congruent        Patient is medically stable for discharge to [facility] with outpatient follow up.        The total amount of time spent reviewing the hospital notes, laboratory values, imaging findings, assessing/counseling the patient, discussing with consultant physicians, social work, nursing staff took 60 minutes

## 2019-07-10 NOTE — PROGRESS NOTE ADULT - ASSESSMENT
86 y/o F with PMHx of Diastolic HF, bronchitis, and chronic LE edema presenting from [facility] with shortness of breath and cough, admitted for acute hypoxic respiratory failure with hypercapnia, status asthmaticus, enterorhinovirus, intubated, positive troponin    Elevated Troponins  - Troponin levels seem to have peaked and trended down.  The trend, along with the CK, is not suggestive of ACS, but rather demand ischemia related to her respiratory decompensation  - She had a non-specific TWI in V1 but no clinical evidence of cardiac ischemia  - Continue aspirin and statin.  Statin dose reduced    HFpEF  - Patient has a history of HFpEF.  Continue to hold Lasix for now  - Her CxR yesterday was not suggestive of vascular congestion.  There is no clinical evidence of volume overload  - Echo with normal LV function, mild diastolic dysfunction, mild RVE and decreased function.  - She does have trace chronic BLE but no evidence of volume overload  - Monitor and replete lytes, keep K>4, Mg>2    Respiratory Failure  - She is s/p intubation and successful extubation  - She is now on RA and appears comfortable.  She is on home NC prn  - Continue bronchodilators and steroid  - Encourage incentive spirometer  - Pulmonary toilet    DVT ppx  - Per Primary    - Further cardiac workup pending clinical course  - All other workup per primary team    Kerry Freire [facility]  Cardiology   Spectra #[phone]

## 2019-07-10 NOTE — DISCHARGE NOTE PROVIDER - CARE PROVIDER_API CALL
Maisha Faulkner)  Internal Medicine; Rheumatology  [address] [place] [address]  Phone: [phone]  Fax: [phone]  Follow Up Time:

## 2020-03-05 NOTE — PHYSICAL THERAPY INITIAL EVALUATION ADULT - LEVEL OF INDEPENDENCE: GAIT, REHAB EVAL
Up to commode and back to bed, tolerated well.  Jokes that he wishes we could just give him some happy juice.  Denies pain at this time.    minimum assist (75% patients effort)

## 2020-09-04 NOTE — ED ADULT NURSE NOTE - SKIN INTEGRITY
Pt has been mostly isolative to room for this shift. Declines to attend groups on the unit. Ate minimal amount of dinner. Pt denies current symptoms of nausea. Education provided on frequency she can receive alternating Tylenol and Ibuprofen for cramping/headaches. PRN Medication Documentation    Specific patient behavior that led to need for PRN medication: abdominal cramping  Staff interventions attempted prior to PRN being given: emotional support, medication education  PRN medication given: PO PRN Tylenol 650mg  Patient response/effectiveness of PRN medication: will continue to monitor          2012 Pt alerts staff that she is experiencing vaginal bleeding. Paged on-call NP Cite Robby. 2028 Orders received for [facility] Hospitalist consult. 2034 Paged Dr. Nat De Souza, she agrees to see the pt    2042 OB MD on floor to see pt          PRN Medication Documentation    Specific patient behavior that led to need for PRN medication: c/o abdominal pain  Staff interventions attempted prior to PRN being given: emotional support, medication education  PRN medication given: PRN PO Ibuprofen 600mg  Patient response/effectiveness of PRN medication: will continue to monitor    PRN Medication Documentation    Specific patient behavior that led to need for PRN medication: c/o of nausea, emesis in basin  Staff interventions attempted prior to PRN being given: support, medication education, ginger ale offered  PRN medication given: PRN PO Zofran 4mg  ODT  Patient response/effectiveness of PRN medication: will continue to monitor     Pt received up in room. Calm and cooperative with staff. Pt complains of headache, RN offers available Tylenol. Pt declines, asks for this RN to call her OBGYN to ask for \"pain pills\" RN offers education on the protocol and available medications. Pt again declines available Tylenol.      Problem: Depressed Mood (Adult/Pediatric)  Goal: *STG: Participates in treatment plan  Outcome: Progressing Towards Goal  Goal: *STG: Verbalizes anger, guilt, and other feelings in a constructive manor  Outcome: Progressing Towards Goal  Goal: *STG: Attends activities and groups  Outcome: Progressing Towards Goal pressure ulcer(s)

## 2021-11-16 NOTE — H&P ADULT - NSHPROSALLOTHERNEGRD_GEN_ALL_CORE
Caller is wanting to check the status of disabilty paperwork Dropped off to the clinic on last week (Thursday). Writer has advised the caller of a callback from the clinic within 24-72 hours.    Patient Name: Linden Acevedo  Caller Name: Linden  Name of Facility: [facility]  Callback Number: [phone]  Best Availability: any  Can A Detailed Message Be Left? yes  Fax Number: na  Due Date: ASAP  Additional Info: Patient called in about disability paper work dropped off at office last week wondering if it has been filled out and could be faxed to number on paper work to call him when cared for.   Did you confirm the message with the caller?: yes      Thank you,  Kayla Dillard  
Do you have forms for hm?  
I did not find any forms in my box.    Italia Jackman MD, Internal Medicine  11/15/2021 5:02 PM   
Left message for patient to return call. I left a detailed v.m. that I am helping out with the messages today and I do not find or see any forms, mail boxes nor media tab in chart. Not sure if he can get another copy or what else to tell him in regards to this.     He does have an upcoming appointment with  for 11/22/21.  I will forward to  as a head's up.    
All other review of systems negative, except as noted in HPI

## 2022-08-31 NOTE — H&P ADULT - [facility]
Patient: Erna Gilbert   : 1967 MRN: [id number]    SUBJECTIVE:  Chief Complaint   Patient presents with   • Office Visit   • Knee     FU L TKA DOS 22   • Surgical Followup       This 55 year old female is here for a post-operative follow-up of left TKA DOS 22.    HISTORY OF PRESENT ILLNESS: The patient presents for a surgical follow-up of left TKA DOS 22. Patient is doing overall good. She is able to knee on the knees, but avoids doing it often due to the stiffness. Continues to fell stiff and sore in the knee along with a clicking.    She has completed her course in physical therapy. She has one more appointment with her PT next week.    Review of systems    Per HPI.  All other systems reviewed and are negative.      OBJECTIVE:    Physical examination: Left knee: Flexes to about 110 degrees.     ASSESSMENT AND PLAN:    1. Primary osteoarthritis of left knee      Plan:  · Patient is doing overall good.  · Recommended to gradually increase the bending of the knee and keep building knee strength.  · Discussed the full recovery will take 6-12 months.  · Explained that the clicking is normal with total knee replacement.  · Recommended continuing the exercises.  · Reviewed and discussed the imaging reports and records.  · All the patient’s questions were answered.  · Patient agrees with the plan.  · Advised to follow up next year in May 2022 or sooner as needed.        Permission to use a Virtual Scribe was obtained during the encounter.    Entered by Dr. Erika Quiroga acting as scribe for Dr. Haider Herrera MD      I have reviewed and edited the visit summary above and attest that it is accurate.       regular rate and rhythm

## 2023-09-23 ENCOUNTER — EMERGENCY (EMERGENCY)
Facility: HOSPITAL | Age: 88
LOS: 1 days | Discharge: HOME | End: 2023-09-23
Attending: EMERGENCY MEDICINE | Admitting: EMERGENCY MEDICINE
Payer: MEDICARE

## 2023-09-23 ENCOUNTER — EMERGENCY (EMERGENCY)
Facility: HOSPITAL | Age: 88
LOS: 1 days | Discharge: ANOTHER HOSPITAL | End: 2023-09-23
Attending: EMERGENCY MEDICINE | Admitting: EMERGENCY MEDICINE
Payer: MEDICARE

## 2023-09-23 VITALS
OXYGEN SATURATION: 99 % | HEART RATE: 83 BPM | WEIGHT: 119.93 LBS | DIASTOLIC BLOOD PRESSURE: 75 MMHG | RESPIRATION RATE: 18 BRPM | SYSTOLIC BLOOD PRESSURE: 117 MMHG | TEMPERATURE: 99 F

## 2023-09-23 VITALS
SYSTOLIC BLOOD PRESSURE: 104 MMHG | OXYGEN SATURATION: 100 % | TEMPERATURE: 98 F | DIASTOLIC BLOOD PRESSURE: 61 MMHG | RESPIRATION RATE: 16 BRPM | HEART RATE: 71 BPM

## 2023-09-23 VITALS
WEIGHT: 89.95 LBS | HEIGHT: 61 IN | HEART RATE: 77 BPM | SYSTOLIC BLOOD PRESSURE: 107 MMHG | DIASTOLIC BLOOD PRESSURE: 66 MMHG | OXYGEN SATURATION: 100 % | RESPIRATION RATE: 18 BRPM | TEMPERATURE: 97 F

## 2023-09-23 LAB
ALBUMIN SERPL ELPH-MCNC: 2.9 G/DL — LOW (ref 3.3–5)
ALP SERPL-CCNC: 123 U/L — HIGH (ref 40–120)
ALT FLD-CCNC: 18 U/L — SIGNIFICANT CHANGE UP (ref 12–78)
ANION GAP SERPL CALC-SCNC: 13 MMOL/L — SIGNIFICANT CHANGE UP (ref 5–17)
ANION GAP SERPL CALC-SCNC: 7 MMOL/L — SIGNIFICANT CHANGE UP (ref 5–17)
APTT BLD: 26.2 SEC — SIGNIFICANT CHANGE UP (ref 24.5–35.6)
APTT BLD: 26.6 SEC — SIGNIFICANT CHANGE UP (ref 24.5–35.6)
AST SERPL-CCNC: 19 U/L — SIGNIFICANT CHANGE UP (ref 15–37)
BASOPHILS # BLD AUTO: 0.02 K/UL — SIGNIFICANT CHANGE UP (ref 0–0.2)
BASOPHILS # BLD AUTO: 0.03 K/UL — SIGNIFICANT CHANGE UP (ref 0–0.2)
BASOPHILS NFR BLD AUTO: 0.3 % — SIGNIFICANT CHANGE UP (ref 0–2)
BASOPHILS NFR BLD AUTO: 0.4 % — SIGNIFICANT CHANGE UP (ref 0–2)
BILIRUB SERPL-MCNC: 0.2 MG/DL — SIGNIFICANT CHANGE UP (ref 0.2–1.2)
BLD GP AB SCN SERPL QL: SIGNIFICANT CHANGE UP
BUN SERPL-MCNC: 27.7 MG/DL — HIGH (ref 8–20)
BUN SERPL-MCNC: 29 MG/DL — HIGH (ref 7–23)
CALCIUM SERPL-MCNC: 8.7 MG/DL — SIGNIFICANT CHANGE UP (ref 8.4–10.5)
CALCIUM SERPL-MCNC: 8.8 MG/DL — SIGNIFICANT CHANGE UP (ref 8.5–10.1)
CHLORIDE SERPL-SCNC: 100 MMOL/L — SIGNIFICANT CHANGE UP (ref 96–108)
CHLORIDE SERPL-SCNC: 105 MMOL/L — SIGNIFICANT CHANGE UP (ref 96–108)
CO2 SERPL-SCNC: 27 MMOL/L — SIGNIFICANT CHANGE UP (ref 22–29)
CO2 SERPL-SCNC: 30 MMOL/L — SIGNIFICANT CHANGE UP (ref 22–31)
CREAT SERPL-MCNC: 0.7 MG/DL — SIGNIFICANT CHANGE UP (ref 0.5–1.3)
CREAT SERPL-MCNC: 0.76 MG/DL — SIGNIFICANT CHANGE UP (ref 0.5–1.3)
EGFR: 75 ML/MIN/1.73M2 — SIGNIFICANT CHANGE UP
EGFR: 83 ML/MIN/1.73M2 — SIGNIFICANT CHANGE UP
EOSINOPHIL # BLD AUTO: 0.13 K/UL — SIGNIFICANT CHANGE UP (ref 0–0.5)
EOSINOPHIL # BLD AUTO: 0.18 K/UL — SIGNIFICANT CHANGE UP (ref 0–0.5)
EOSINOPHIL NFR BLD AUTO: 2 % — SIGNIFICANT CHANGE UP (ref 0–6)
EOSINOPHIL NFR BLD AUTO: 2.3 % — SIGNIFICANT CHANGE UP (ref 0–6)
GLUCOSE SERPL-MCNC: 106 MG/DL — HIGH (ref 70–99)
GLUCOSE SERPL-MCNC: 125 MG/DL — HIGH (ref 70–99)
HCT VFR BLD CALC: 34.5 % — SIGNIFICANT CHANGE UP (ref 34.5–45)
HCT VFR BLD CALC: 35.4 % — SIGNIFICANT CHANGE UP (ref 34.5–45)
HGB BLD-MCNC: 10.8 G/DL — LOW (ref 11.5–15.5)
HGB BLD-MCNC: 11.1 G/DL — LOW (ref 11.5–15.5)
IMM GRANULOCYTES NFR BLD AUTO: 0.1 % — SIGNIFICANT CHANGE UP (ref 0–0.9)
IMM GRANULOCYTES NFR BLD AUTO: 0.5 % — SIGNIFICANT CHANGE UP (ref 0–0.9)
INR BLD: 1.05 RATIO — SIGNIFICANT CHANGE UP (ref 0.85–1.18)
INR BLD: 1.09 RATIO — SIGNIFICANT CHANGE UP (ref 0.85–1.18)
LYMPHOCYTES # BLD AUTO: 0.87 K/UL — LOW (ref 1–3.3)
LYMPHOCYTES # BLD AUTO: 15.4 % — SIGNIFICANT CHANGE UP (ref 13–44)
LYMPHOCYTES # BLD AUTO: 2.87 K/UL — SIGNIFICANT CHANGE UP (ref 1–3.3)
LYMPHOCYTES # BLD AUTO: 31.3 % — SIGNIFICANT CHANGE UP (ref 13–44)
MCHC RBC-ENTMCNC: 27.3 PG — SIGNIFICANT CHANGE UP (ref 27–34)
MCHC RBC-ENTMCNC: 27.3 PG — SIGNIFICANT CHANGE UP (ref 27–34)
MCHC RBC-ENTMCNC: 31.3 GM/DL — LOW (ref 32–36)
MCHC RBC-ENTMCNC: 31.4 GM/DL — LOW (ref 32–36)
MCV RBC AUTO: 87.2 FL — SIGNIFICANT CHANGE UP (ref 80–100)
MCV RBC AUTO: 87.3 FL — SIGNIFICANT CHANGE UP (ref 80–100)
MONOCYTES # BLD AUTO: 0.54 K/UL — SIGNIFICANT CHANGE UP (ref 0–0.9)
MONOCYTES # BLD AUTO: 0.76 K/UL — SIGNIFICANT CHANGE UP (ref 0–0.9)
MONOCYTES NFR BLD AUTO: 8.3 % — SIGNIFICANT CHANGE UP (ref 2–14)
MONOCYTES NFR BLD AUTO: 9.5 % — SIGNIFICANT CHANGE UP (ref 2–14)
NEUTROPHILS # BLD AUTO: 4.07 K/UL — SIGNIFICANT CHANGE UP (ref 1.8–7.4)
NEUTROPHILS # BLD AUTO: 5.33 K/UL — SIGNIFICANT CHANGE UP (ref 1.8–7.4)
NEUTROPHILS NFR BLD AUTO: 58 % — SIGNIFICANT CHANGE UP (ref 43–77)
NEUTROPHILS NFR BLD AUTO: 71.9 % — SIGNIFICANT CHANGE UP (ref 43–77)
NRBC # BLD: 0 /100 WBCS — SIGNIFICANT CHANGE UP (ref 0–0)
PLATELET # BLD AUTO: 168 K/UL — SIGNIFICANT CHANGE UP (ref 150–400)
PLATELET # BLD AUTO: 181 K/UL — SIGNIFICANT CHANGE UP (ref 150–400)
POTASSIUM SERPL-MCNC: 3.5 MMOL/L — SIGNIFICANT CHANGE UP (ref 3.5–5.3)
POTASSIUM SERPL-MCNC: 3.5 MMOL/L — SIGNIFICANT CHANGE UP (ref 3.5–5.3)
POTASSIUM SERPL-SCNC: 3.5 MMOL/L — SIGNIFICANT CHANGE UP (ref 3.5–5.3)
POTASSIUM SERPL-SCNC: 3.5 MMOL/L — SIGNIFICANT CHANGE UP (ref 3.5–5.3)
PROT SERPL-MCNC: 6.5 G/DL — SIGNIFICANT CHANGE UP (ref 6–8.3)
PROTHROM AB SERPL-ACNC: 12.1 SEC — SIGNIFICANT CHANGE UP (ref 9.5–13)
PROTHROM AB SERPL-ACNC: 12.3 SEC — SIGNIFICANT CHANGE UP (ref 9.5–13)
RBC # BLD: 3.95 M/UL — SIGNIFICANT CHANGE UP (ref 3.8–5.2)
RBC # BLD: 4.06 M/UL — SIGNIFICANT CHANGE UP (ref 3.8–5.2)
RBC # FLD: 14.6 % — HIGH (ref 10.3–14.5)
RBC # FLD: 14.8 % — HIGH (ref 10.3–14.5)
SODIUM SERPL-SCNC: 140 MMOL/L — SIGNIFICANT CHANGE UP (ref 135–145)
SODIUM SERPL-SCNC: 142 MMOL/L — SIGNIFICANT CHANGE UP (ref 135–145)
WBC # BLD: 5.66 K/UL — SIGNIFICANT CHANGE UP (ref 3.8–10.5)
WBC # BLD: 9.18 K/UL — SIGNIFICANT CHANGE UP (ref 3.8–10.5)
WBC # FLD AUTO: 5.66 K/UL — SIGNIFICANT CHANGE UP (ref 3.8–10.5)
WBC # FLD AUTO: 9.18 K/UL — SIGNIFICANT CHANGE UP (ref 3.8–10.5)

## 2023-09-23 PROCEDURE — 72125 CT NECK SPINE W/O DYE: CPT | Mod: 26,MA

## 2023-09-23 PROCEDURE — 73552 X-RAY EXAM OF FEMUR 2/>: CPT | Mod: 26,RT

## 2023-09-23 PROCEDURE — 72170 X-RAY EXAM OF PELVIS: CPT | Mod: 26

## 2023-09-23 PROCEDURE — 12011 RPR F/E/E/N/L/M 2.5 CM/<: CPT

## 2023-09-23 PROCEDURE — 85025 COMPLETE CBC W/AUTO DIFF WBC: CPT

## 2023-09-23 PROCEDURE — 80053 COMPREHEN METABOLIC PANEL: CPT

## 2023-09-23 PROCEDURE — 36415 COLL VENOUS BLD VENIPUNCTURE: CPT

## 2023-09-23 PROCEDURE — 70450 CT HEAD/BRAIN W/O DYE: CPT | Mod: MA

## 2023-09-23 PROCEDURE — 85610 PROTHROMBIN TIME: CPT

## 2023-09-23 PROCEDURE — 90471 IMMUNIZATION ADMIN: CPT

## 2023-09-23 PROCEDURE — 99291 CRITICAL CARE FIRST HOUR: CPT | Mod: GC

## 2023-09-23 PROCEDURE — 71045 X-RAY EXAM CHEST 1 VIEW: CPT | Mod: 26

## 2023-09-23 PROCEDURE — 99291 CRITICAL CARE FIRST HOUR: CPT | Mod: FT,25

## 2023-09-23 PROCEDURE — 72125 CT NECK SPINE W/O DYE: CPT | Mod: MA

## 2023-09-23 PROCEDURE — 70450 CT HEAD/BRAIN W/O DYE: CPT | Mod: 26,MA

## 2023-09-23 PROCEDURE — 90715 TDAP VACCINE 7 YRS/> IM: CPT

## 2023-09-23 PROCEDURE — 93010 ELECTROCARDIOGRAM REPORT: CPT

## 2023-09-23 PROCEDURE — 99291 CRITICAL CARE FIRST HOUR: CPT | Mod: 25

## 2023-09-23 PROCEDURE — 99292 CRITICAL CARE ADDL 30 MIN: CPT | Mod: GC

## 2023-09-23 PROCEDURE — 85730 THROMBOPLASTIN TIME PARTIAL: CPT

## 2023-09-23 RX ORDER — TETANUS TOXOID, REDUCED DIPHTHERIA TOXOID AND ACELLULAR PERTUSSIS VACCINE, ADSORBED 5; 2.5; 8; 8; 2.5 [IU]/.5ML; [IU]/.5ML; UG/.5ML; UG/.5ML; UG/.5ML
0.5 SUSPENSION INTRAMUSCULAR ONCE
Refills: 0 | Status: COMPLETED | OUTPATIENT
Start: 2023-09-23 | End: 2023-09-23

## 2023-09-23 RX ADMIN — TETANUS TOXOID, REDUCED DIPHTHERIA TOXOID AND ACELLULAR PERTUSSIS VACCINE, ADSORBED 0.5 MILLILITER(S): 5; 2.5; 8; 8; 2.5 SUSPENSION INTRAMUSCULAR at 15:57

## 2023-09-23 NOTE — CONSULT NOTE ADULT - SUBJECTIVE AND OBJECTIVE BOX
89 y/o F pt Transfer from [facility] for a fall at a nursing home resulting in a SAH. PT with bruising, swelling and sutures to right forehead. PT is poor historian, baseline dementia. Offers no complaints.          HISTORY OF PRESENT ILLNESS:   89yF PMH presents from    PAST MEDICAL & SURGICAL HISTORY:  HTN (hypertension)  Anxiety  Moderate persistent asthma without complication  Hyperlipidemia, unspecified hyperlipidemia type  NSTEMI (non-ST elevated myocardial infarction)  12/5/2016  No significant past surgical history        FAMILY HISTORY:  No pertinent family history in first degree relatives        SOCIAL HISTORY:  Tobacco Use:  EtOH use:   Substance:    Allergies    penicillin (Anaphylaxis)  penicillins (Unknown)    Intolerances        REVIEW OF SYSTEMS  CONSTITUTIONAL: No fever, weight loss, or fatigue  EYES: No eye pain, visual disturbances, or discharge  ENMT:  No difficulty hearing, tinnitus, vertigo; No sinus or throat pain  NECK: No pain or stiffness  BREASTS: No pain, masses, or nipple discharge  RESPIRATORY: No cough, wheezing, chills or hemoptysis; No shortness of breath  CARDIOVASCULAR: No chest pain, palpitations, dizziness, or leg swelling  GASTROINTESTINAL: No abdominal or epigastric pain. No nausea, vomiting, or hematemesis; No diarrhea or constipation. No melena or hematochezia.  GENITOURINARY: No dysuria, frequency, hematuria, or incontinence  NEUROLOGICAL: No headaches, memory loss, loss of strength, numbness, or tremors  SKIN: No itching, burning, rashes, or lesions   LYMPH NODES: No enlarged glands  ENDOCRINE: No heat or cold intolerance; No hair loss  MUSCULOSKELETAL: No joint pain or swelling; No muscle, back, or extremity pain  PSYCHIATRIC: No depression, anxiety, mood swings, or difficulty sleeping  HEME/LYMPH: No easy bruising, or bleeding gums  ALLERY AND IMMUNOLOGIC: No hives or eczema    HOME MEDICATIONS:  Home Medications:  albuterol 90 mcg/inh inhalation aerosol: 2 puff(s) inhaled every 6 hours (07 Feb 2017 10:35)  ALPRAZolam: 0.125 milligram(s) orally every 8 hours, As Needed (07 Feb 2017 10:35)  aspirin 81 mg oral tablet: 1 tab(s) orally once a day (06 Feb 2017 12:18)  carvedilol 6.25 mg oral tablet: 1 tab(s) orally 2 times a day (06 Feb 2017 12:18)  escitalopram 10 mg oral tablet: 1 tab(s) orally once a day (06 Feb 2017 12:18)  furosemide 40 mg oral tablet: 1 tab(s) orally once a day (07 Feb 2017 10:35)  lisinopril 2.5 mg oral tablet: 1 tab(s) orally once a day (07 Feb 2017 10:35)  simvastatin 40 mg oral tablet: 1 tab(s) orally once a day (at bedtime) (06 Feb 2017 12:18)      MEDICATIONS:  Antibiotics:    Neuro:    Anticoagulation:    OTHER:    IVF:      Vital Signs Last 24 Hrs  T(C): 37.2 (23 Sep 2023 18:23), Max: 37.2 (23 Sep 2023 18:23)  T(F): 98.9 (23 Sep 2023 18:23), Max: 98.9 (23 Sep 2023 18:23)  HR: 75 (23 Sep 2023 20:52) (75 - 83)  BP: 146/70 (23 Sep 2023 20:52) (117/75 - 146/70)  BP(mean): --  RR: 18 (23 Sep 2023 20:52) (17 - 18)  SpO2: 97% (23 Sep 2023 20:52) (97% - 100%)    Parameters below as of 23 Sep 2023 20:52  Patient On (Oxygen Delivery Method): room air          PHYSICAL EXAM:  GENERAL: NAD, well-groomed, well-developed  HEAD:  Atraumatic, normocephalic  DRAINS:   WOUND: Dressing clean dry intact; well healed  SHUNT: easily compressible and refills  EYES: Conjunctiva and sclera clear; corneal reflex intact  ENMT: No tonsillar erythema, exudates, or enlargement; moist mucous membranes, good dentition, no lesions  NECK: Supple, no JVD, dormal thyroid  OLGA COMA SCORE: E- V- M- =       E: 4= opens eyes spontaneously 3= to voice 2= to noxious 1= no opening       V: 5= oriented 4= confused 3= inappropriate words 2= incomprehensible sounds 1= nonverbal 1T= intubated       M: 6= follows commands 5= localizes 4= withdraws 3= flexor posturing 2= extensor posturing 1= no movement  MENTAL STATUS: AAO x3; Awake/Comatose; Opens eyes spontaneously/to voice/to light touch/to noxious stimuli; Appropriately conversant without aphasia/Nonverbal; following simple commands/mimicking/not following commands  CRANIAL NERVES: Visual acuity normal for age, visual fields full to confrontation, PERRL. EOMI without nystagmus. Facial sensation intact V1-3 distribution b/l. Face symmetric w/ normal eye closure and smile, tongue midline. Hearing grossly intact. Speech clear. Head turning and shoulder shrug intact.   REFLEXES: Doll's eyes positive. Blinks to threat. Gag reflex intact. No pronator drift; DTRs 2+ intact and symmetric; negative Cook's b/l; negative clonus b/l; no upper extremity dysmetria  MOTOR: strength 5/5 b/l upper and lower extremities  Uppers     Delt     Bicep     Tricep     HG  R                5/5 5/5         5/5         5/5  L                5/5 5/5         5/5         5/5  Lowers      HF     KF      KE     PF     DF     EHL  R             5/5 5/5 5/5    5/5   5/5    5/5  L              5/5 5/5      5/5    5/5   5/5    5/5  SENSATION: grossly intact to light touch all extremities  COORDINATION: Gait intact; rapid alternating movements intact b/l upper extremities; no upper extremity dysmetria  SENSATION: grossly intact to light touch all extremities  MUSCLE STRETCH REFLEXES: DTRs 2+ intact and symmetric; no Cook's b/l; no clonus b/l  PLANTAR: upgoing/downgoing/mute (Babinski)  CHEST/LUNG: Clear to auscultation bilaterally; no rales, rhonchi, wheezing, or rubs  HEART: +S1/+S2; Regular rate and rhythm; no murmurs, rubs, or gallops  ABDOMEN: Soft, nontender, nondistended; bowel sounds present all four quadrants  EXTREMITIES:  2+ peripheral pulses, no clubbing, cyanosis, or edema  LYMPH: No lymphadenopathy noted  SKIN: Warm, dry; no rashes or lesions    LABS:                        10.8   5.66  )-----------( 168      ( 23 Sep 2023 18:49 )             34.5     09-23    140  |  100  |  27.7<H>  ----------------------------<  106<H>  3.5   |  27.0  |  0.70    Ca    8.7      23 Sep 2023 18:49      PT/INR - ( 23 Sep 2023 18:49 )   PT: 12.1 sec;   INR: 1.09 ratio         PTT - ( 23 Sep 2023 18:49 )  PTT:26.6 sec  Urinalysis Basic - ( 23 Sep 2023 18:49 )    Color: x / Appearance: x / SG: x / pH: x  Gluc: 106 mg/dL / Ketone: x  / Bili: x / Urobili: x   Blood: x / Protein: x / Nitrite: x   Leuk Esterase: x / RBC: x / WBC x   Sq Epi: x / Non Sq Epi: x / Bacteria: x        CULTURES:      RADIOLOGY & ADDITIONAL STUDIES: HISTORY OF PRESENT ILLNESS:   89yF PMH HTN, hospice, dementia, transferred to [facility] from NH s/p fall. Pt does not remember event. CTH subarachnoid hemorrhage along the right frontoparietal lobes. Neurosurgery called to evaluate.     PAST MEDICAL & SURGICAL HISTORY:  HTN (hypertension)  Anxiety  Moderate persistent asthma without complication  Hyperlipidemia, unspecified hyperlipidemia type  NSTEMI (non-ST elevated myocardial infarction)  12/5/2016  No significant past surgical history      FAMILY HISTORY:  No pertinent family history in first degree relatives      SOCIAL HISTORY:  Tobacco Use: denies  EtOH use: denies  Substance: denies    Allergies    penicillin (Anaphylaxis)  penicillins (Unknown)    Intolerances      REVIEW OF SYSTEMS  CONSTITUTIONAL: No fever, weight loss, or fatigue  EYES: No eye pain, visual disturbances, or discharge  ENMT:  No difficulty hearing, tinnitus, vertigo; No sinus or throat pain  NECK: No pain or stiffness  RESPIRATORY: No cough, wheezing, chills or hemoptysis; No shortness of breath  CARDIOVASCULAR: No chest pain, palpitations, dizziness, or leg swelling  GASTROINTESTINAL: No abdominal or epigastric pain. No nausea, vomiting, or hematemesis; No diarrhea or constipation. No melena or hematochezia.  GENITOURINARY: No dysuria, frequency, hematuria, or incontinence  NEUROLOGICAL: No headaches, memory loss, loss of strength, numbness, or tremors  SKIN: No itching, burning, rashes, or lesions   LYMPH NODES: No enlarged glands  ENDOCRINE: No heat or cold intolerance; No hair loss  MUSCULOSKELETAL: No joint pain or swelling; No muscle, back, or extremity pain  PSYCHIATRIC: No depression, anxiety, mood swings, or difficulty sleeping  HEME/LYMPH: No easy bruising, or bleeding gums  ALLERY AND IMMUNOLOGIC: No hives or eczema    HOME MEDICATIONS:  Home Medications:  albuterol 90 mcg/inh inhalation aerosol: 2 puff(s) inhaled every 6 hours (07 Feb 2017 10:35)  ALPRAZolam: 0.125 milligram(s) orally every 8 hours, As Needed (07 Feb 2017 10:35)  aspirin 81 mg oral tablet: 1 tab(s) orally once a day (06 Feb 2017 12:18)  carvedilol 6.25 mg oral tablet: 1 tab(s) orally 2 times a day (06 Feb 2017 12:18)  escitalopram 10 mg oral tablet: 1 tab(s) orally once a day (06 Feb 2017 12:18)  furosemide 40 mg oral tablet: 1 tab(s) orally once a day (07 Feb 2017 10:35)  lisinopril 2.5 mg oral tablet: 1 tab(s) orally once a day (07 Feb 2017 10:35)  simvastatin 40 mg oral tablet: 1 tab(s) orally once a day (at bedtime) (06 Feb 2017 12:18)        Vital Signs Last 24 Hrs  T(C): 37.2 (23 Sep 2023 18:23), Max: 37.2 (23 Sep 2023 18:23)  T(F): 98.9 (23 Sep 2023 18:23), Max: 98.9 (23 Sep 2023 18:23)  HR: 75 (23 Sep 2023 20:52) (75 - 83)  BP: 146/70 (23 Sep 2023 20:52) (117/75 - 146/70)  BP(mean): --  RR: 18 (23 Sep 2023 20:52) (17 - 18)  SpO2: 97% (23 Sep 2023 20:52) (97% - 100%)    Parameters below as of 23 Sep 2023 20:52  Patient On (Oxygen Delivery Method): room air      PHYSICAL EXAM:  GENERAL: NAD, well-groomed  HEAD:  Atraumatic, normocephalic  EYES: Conjunctiva and sclera clear; corneal reflex intact  ENMT: No tonsillar erythema, exudates, or enlargement; moist mucous membranes  NECK: Supple, no JVD, dormal thyroid  OLGA COMA SCORE: E-4 V-5 M-6 =15       E: 4= opens eyes spontaneously 3= to voice 2= to noxious 1= no opening       V: 5= oriented 4= confused 3= inappropriate words 2= incomprehensible sounds 1= nonverbal 1T= intubated       M: 6= follows commands 5= localizes 4= withdraws 3= flexor posturing 2= extensor posturing 1= no movement  MENTAL STATUS: AAO x3; Awake; Opens eyes spontaneously; Appropriately conversant without aphasia; following simple commands  CRANIAL NERVES:  TOPHER. EOMI without nystagmus. Facial sensation intact V1-3 distribution b/l. Face symmetric w/ normal eye closure and smile, tongue midline. Hearing grossly intact. Speech clear   REFLEXES: No pronator drift; no upper extremity dysmetria  MOTOR: strength 5/5 b/l upper and lower extremities  Uppers     Delt     Bicep     Tricep     HG  R                5/5       5/5         5/5         5/5  L                5/5       5/5         5/5         5/5  Lowers      HF     KF      KE     PF     DF     EHL  R             5/5     5/5     5/5    5/5   5/5    5/5  L              5/5    5/5      5/5    5/5   5/5    5/5  SENSATION: grossly intact to light touch all extremities    LABS:                        10.8   5.66  )-----------( 168      ( 23 Sep 2023 18:49 )             34.5     09-23    140  |  100  |  27.7<H>  ----------------------------<  106<H>  3.5   |  27.0  |  0.70    Ca    8.7      23 Sep 2023 18:49      PT/INR - ( 23 Sep 2023 18:49 )   PT: 12.1 sec;   INR: 1.09 ratio         PTT - ( 23 Sep 2023 18:49 )  PTT:26.6 sec  Urinalysis Basic - ( 23 Sep 2023 18:49 )    Color: x / Appearance: x / SG: x / pH: x  Gluc: 106 mg/dL / Ketone: x  / Bili: x / Urobili: x   Blood: x / Protein: x / Nitrite: x   Leuk Esterase: x / RBC: x / WBC x   Sq Epi: x / Non Sq Epi: x / Bacteria: x      RADIOLOGY & ADDITIONAL STUDIES:      ACC: [id number] EXAM:  CT BRAIN     PROCEDURE DATE:  09/23/2023    IMPRESSION:  Subarachnoid hemorrhage along the right frontoparietal lobes. No   hydrocephalus.

## 2023-09-23 NOTE — ED PROVIDER NOTE - OBJECTIVE STATEMENT
Patient brought in by EMS for multiple lacerations to forehead status post fall at nursing home.  Patient poor historian secondary to dementia unsure why in ER but denies any pain or discomfort.  Unknown last tetanus.

## 2023-09-23 NOTE — ED PROVIDER NOTE - PROGRESS NOTE DETAILS
pt with SAH on CT, D/W daughter (HCP) she agrees with er for trauma and neurosurg eval. transfer center called d/w william ([facility] trauma) agrees with er->er xfer dr samayoa ([facility] neurosurg) accepting xfer er->er

## 2023-09-23 NOTE — CONSULT NOTE ADULT - NS ATTEND AMEND GEN_ALL_CORE FT
I have seen and examined this patient with the resident.  I agree with the above, along with the followinF PMHx HTN, anxiety, and uterine cancer presents from her nursing home after a fall while ambulating.  She has frequent falls.  She has dementia and is a poor historian, but denies syncope before/after falling.  On exam, she has no other signs of trauma other than a laceration over her right eye.  She will require NSx follow-up, repeat CTH, CXR, PXR, and can DC if all is WNL.

## 2023-09-23 NOTE — ED ADULT TRIAGE NOTE - CHIEF COMPLAINT QUOTE
Transfer from [facility] for a fall at a nursing home resulting in a SAH. PT with bruising, swelling and sutures to right forehead. PT is poor historian, baseline dementia. Offers no complaints.

## 2023-09-23 NOTE — ED PROVIDER NOTE - CLINICAL SUMMARY MEDICAL DECISION MAKING FREE TEXT BOX
Patient brought in by EMS for multiple lacerations to forehead status post fall at nursing home.  Patient poor historian secondary to dementia unsure why in ER but denies any pain or discomfort.  Unknown last tetanus.    Plan CT head and C-spine update tetanus laceration repair    Differential including but not limited to ICH fracture laceration

## 2023-09-23 NOTE — ED PROVIDER NOTE - OBJECTIVE STATEMENT
88 y/o F pt Transfer from [facility] for a fall at a nursing home resulting in a SAH. PT with bruising, swelling and sutures to right forehead. PT is poor historian, baseline dementia. Offers no complaints.

## 2023-09-23 NOTE — ED PROVIDER NOTE - PATIENT PORTAL LINK FT
You can access the FollowMyHealth Patient Portal offered by [facility] by registering at the following website: [email or web address]. By joining [employer]’s FollowMyHealth portal, you will also be able to view your health information using other applications (apps) compatible with our system.

## 2023-09-23 NOTE — ED ADULT NURSE NOTE - OBJECTIVE STATEMENT
89y F BIBEMS from [facility] s/p unwitnessed fall. pt hx dementia, oriented only to self, not time/situation, poor historian and unaware of fall/incident. LAC to RT forehead, pending stitch repair. pt verbally denies any pain including head/neck/abd/hip/extremities. stat CT pending.

## 2023-09-23 NOTE — ED ADULT NURSE NOTE - NSFALLHARMRISKINTERV_ED_ALL_ED
Assistance OOB with selected safe patient handling equipment if applicable/Assistance with ambulation/Communicate risk of Fall with Harm to all staff, patient, and family/Encourage patient to sit up slowly, dangle for a short time, stand at bedside before walking/Monitor gait and stability/Orthostatic vital signs/Provide visual cue: red socks, yellow wristband, yellow gown, etc/Reinforce activity limits and safety measures with patient and family/Bed in lowest position, wheels locked, appropriate side rails in place/Call bell, personal items and telephone in reach/Instruct patient to call for assistance before getting out of bed/chair/stretcher/Non-slip footwear applied when patient is off stretcher/[employer] to call system/Physically safe environment - no spills, clutter or unnecessary equipment/Purposeful Proactive Rounding/Room/bathroom lighting operational, light cord in reach

## 2023-09-23 NOTE — ED ADULT TRIAGE NOTE - PAIN: PRESENCE, MLM
Emergency Department Encounter    Patient: Tamara Boyce  MRN: [id number]  : 1988  Date of Evaluation: 2023  ED Provider:  Gregory Howell DO    Triage Chief Complaint:   Abdominal Pain (RLQ x2 days)    [place]:  Tamara Boyce is a 28 y.o. male with a past medical history of GERD, IBS, bipolar disorder, who presents to the ED with a history of abdominal pain since yesterday. Patient endorses generalized abdominal pain with a severity of 10/10. Patient denies any associated history of fever, nausea/vomiting, diarrhea/constipation, fatigue, recent surgery or procedure, dysuria, .      ROS - see HPI, below listed is current ROS at time of my eval:  Review of Systems    ROS is an in history; otherwise unremarkable    Past Medical History:   Diagnosis Date    Bipolar 1 disorder (HCC)     Chronic generalized abdominal pain     Depression     GERD (gastroesophageal reflux disease)     IBS (irritable bowel syndrome)     Mental impairment     OCD (obsessive compulsive disorder)     Schizo affective schizophrenia ([address])      Past Surgical History:   Procedure Laterality Date    COLONOSCOPY      does not know    EGD      does not know     Family History   Problem Relation Age of Onset    Colon Cancer Neg Hx     Esophageal Cancer Neg Hx     Liver Cancer Neg Hx     Rectal Cancer Neg Hx     Stomach Cancer Neg Hx      Social History     Socioeconomic History    Marital status: Unknown     Spouse name: Not on file    Number of children: Not on file    Years of education: Not on file    Highest education level: Not on file   Occupational History    Not on file   Tobacco Use    Smoking status: Never    Smokeless tobacco: Never   Vaping Use    Vaping Use: Never used   Substance and Sexual Activity    Alcohol use: Never    Drug use: Never    Sexual activity: Not on file   Other Topics Concern    Not on file   Social History Narrative    Not on file     Social Determinants of Health     Financial Resource denies pain/discomfort (Rating = 0)

## 2023-09-23 NOTE — ED ADULT TRIAGE NOTE - CHIEF COMPLAINT QUOTE
Patient BIBA from [facility] for unwitnessed fall. Sustained laceration to right eyebrow. Patient with hx of dementia. Unknown if she had LOC. Takes ASA PRN per EMS.

## 2023-09-23 NOTE — ED PROCEDURE NOTE - NS ED ATTENDING STATEMENT MOD
This was a shared visit with the JANEY. I reviewed and verified the documentation and independently performed the documented:

## 2023-09-23 NOTE — ED PROVIDER NOTE - CLINICAL SUMMARY MEDICAL DECISION MAKING FREE TEXT BOX
Transfer from [facility] for a fall at a nursing home resulting in a SAH. Imaging reviewed. Neurosurgery and trauma surgery contacted.

## 2023-09-23 NOTE — ED PROVIDER NOTE - PROGRESS NOTE DETAILS
Received signout from Dr. Ybarra spoke with resident on case as well just spoke with neurosurgery PA recommendations include third CAT scan at 5 AM CAT scan ordered.  if no worsening of subarachnoid hemorrhage advised patient would be able to go home back to her ECF patient currently neurologically intact no deterioration in her neurological status Spoke with neurosurgery PA updated on results of 5 AM CT scan.  awaiting final recs a.m. attending aware of plan of care BENNY recs followed dced back to [facility]. ambulance arranged Spoke with nursing supervisor at [phone].  aware of patient's diagnosis aware of patient's discharge back to their facility  accepting patient back to their facility

## 2023-09-23 NOTE — CONSULT NOTE ADULT - ASSESSMENT
88 yo F s/p fall sustaining small SAH.  HDN stable and neuro intact.  Per daughter, who's the power of , patient remains DNR/DNI and would not want major interventions or procedures and are focused and the comfort of the patient and would like to go back to the facility as soon as possible, where she has been for the last 6.5 years.    No other traumatic injuries identified on imaging or physical exam  Xray of the chest, pelvis, and R femur are grossly unremarkable.  F/U Neurosurgery recommendations, want's a CT in 6 hours from initial one. If cleared by neurosurgery, there's no trauma contraindications for discharge back to facility.  No trauma contraindications to resume regular diet, as the patient would not pursue surgical intervention

## 2023-09-23 NOTE — ED PROVIDER NOTE - CARE PLAN
1 Principal Discharge DX:	Head injury  Secondary Diagnosis:	Forehead laceration   Principal Discharge DX:	SAH (subarachnoid hemorrhage)  Secondary Diagnosis:	Forehead laceration

## 2023-09-23 NOTE — ED ADULT NURSE NOTE - OBJECTIVE STATEMENT
Pt BIBA as transfer from [facility] for SAH. Pt at nursing home this morning when she fell and landed on the R front side of her head. Bruising and swelling noted around R forehead/R eye area. Pt A&Ox2-3 not oriented to time. Pt unsure of physiological vs mechanical fall.

## 2023-09-23 NOTE — CONSULT NOTE ADULT - SUBJECTIVE AND OBJECTIVE BOX
SURGERY CONSULT  ==============================================================================================================  HPI: 89y Female with PMH of dementia, HTN, anxiety, asthma, and uterine cancer, recently graduatted from hospice, presents to the ED after a fall in her nursing home. Patient was found down in the floor with a laceration near her R eyebrow. Patient was found awake and alert with baseline mental status. At [facility] CT head and neck identified a small SAH and patient was transferred to [facility].  On arrival patient reports feeling well, does not know what happened or where she is, recognizes her daughter who states that this is her baseline.       PAST MEDICAL & SURGICAL HISTORY:  HTN (hypertension)      Anxiety      Moderate persistent asthma without complication      Hyperlipidemia, unspecified hyperlipidemia type      NSTEMI (non-ST elevated myocardial infarction)  12/5/2016      No significant past surgical history        Home Meds: Home Medications:  albuterol 90 mcg/inh inhalation aerosol: 2 puff(s) inhaled every 6 hours (07 Feb 2017 10:35)  ALPRAZolam: 0.125 milligram(s) orally every 8 hours, As Needed (07 Feb 2017 10:35)  aspirin 81 mg oral tablet: 1 tab(s) orally once a day (06 Feb 2017 12:18)  carvedilol 6.25 mg oral tablet: 1 tab(s) orally 2 times a day (06 Feb 2017 12:18)  escitalopram 10 mg oral tablet: 1 tab(s) orally once a day (06 Feb 2017 12:18)  furosemide 40 mg oral tablet: 1 tab(s) orally once a day (07 Feb 2017 10:35)  lisinopril 2.5 mg oral tablet: 1 tab(s) orally once a day (07 Feb 2017 10:35)  simvastatin 40 mg oral tablet: 1 tab(s) orally once a day (at bedtime) (06 Feb 2017 12:18)    Allergies: Allergies    penicillin (Anaphylaxis)  penicillins (Unknown)    Intolerances      Soc:   Advanced Directives: Presumed Full Code     CURRENT MEDICATIONS:   --------------------------------------------------------------------------------------  Neurologic Medications    Respiratory Medications    Cardiovascular Medications    Gastrointestinal Medications    Genitourinary Medications    Hematologic/Oncologic Medications    Antimicrobial/Immunologic Medications    Endocrine/Metabolic Medications    Topical/Other Medications    --------------------------------------------------------------------------------------    VITAL SIGNS, INS/OUTS (last 24 hours):  --------------------------------------------------------------------------------------  ICU Vital Signs Last 24 Hrs  T(C): 37.2 (23 Sep 2023 18:23), Max: 37.2 (23 Sep 2023 18:23)  T(F): 98.9 (23 Sep 2023 18:23), Max: 98.9 (23 Sep 2023 18:23)  HR: 75 (23 Sep 2023 20:52) (75 - 83)  BP: 146/70 (23 Sep 2023 20:52) (117/75 - 146/70)  BP(mean): --  ABP: --  ABP(mean): --  RR: 18 (23 Sep 2023 20:52) (17 - 18)  SpO2: 97% (23 Sep 2023 20:52) (97% - 100%)    O2 Parameters below as of 23 Sep 2023 20:52  Patient On (Oxygen Delivery Method): room air          I&O's Summary    --------------------------------------------------------------------------------------    PHYSICAL EXAM:  GENERAL: NAD  HEAD:  R forehead laceration closed and with steristrips, no active bleeding, no skull depressions  EYES: EOMI, PERRLA, conjunctiva and sclera clear  NECK: Supple, No JVD  CHEST/LUNG: non-labored breathing on room air.   HEART: Regular rate and rhythm; S1/S2  ABDOMEN: Soft, Nontender, Nondistended  VASCULAR: Normal pulses, Normal capillary refill  EXTREMITIES:  No deformities, R femur tenderness. Moving all extremities with full ROM. BLE edema, wrapped to mid shin.   SKIN: Warm    LABS  --------------------------------------------------------------------------------------  Labs:  CAPILLARY BLOOD GLUCOSE                              10.8   5.66  )-----------( 168      ( 23 Sep 2023 18:49 )             34.5       Auto Neutrophil %: 71.9 % (09-23-23 @ 18:49)  Auto Immature Granulocyte %: 0.5 % (09-23-23 @ 18:49)    09-23    140  |  100  |  27.7<H>  ----------------------------<  106<H>  3.5   |  27.0  |  0.70      Calcium: 8.7 mg/dL (09-23-23 @ 18:49)      LFTs:         Coags:     12.1   ----< 1.09    ( 23 Sep 2023 18:49 )     26.6                Urinalysis Basic - ( 23 Sep 2023 18:49 )    Color: x / Appearance: x / SG: x / pH: x  Gluc: 106 mg/dL / Ketone: x  / Bili: x / Urobili: x   Blood: x / Protein: x / Nitrite: x   Leuk Esterase: x / RBC: x / WBC x   Sq Epi: x / Non Sq Epi: x / Bacteria: x          --------------------------------------------------------------------------------------     SURGERY CONSULT  ==============================================================================================================  HPI: 89y Female with PMH of dementia, HTN, anxiety, asthma, and uterine cancer, recently graduated from hospice, presents to the ED after a fall in her nursing home. Patient was found down in the floor with a laceration near her R eyebrow. Patient was found awake and alert with baseline mental status. At [facility] CT head and neck identified a small SAH and patient was transferred to [facility].  On arrival patient reports feeling well, does not know what happened or where she is, recognizes her daughter who states that this is her baseline.       PAST MEDICAL & SURGICAL HISTORY:  HTN (hypertension)      Anxiety      Moderate persistent asthma without complication      Hyperlipidemia, unspecified hyperlipidemia type      NSTEMI (non-ST elevated myocardial infarction)  12/5/2016      No significant past surgical history        Home Meds: Home Medications:  albuterol 90 mcg/inh inhalation aerosol: 2 puff(s) inhaled every 6 hours (07 Feb 2017 10:35)  ALPRAZolam: 0.125 milligram(s) orally every 8 hours, As Needed (07 Feb 2017 10:35)  aspirin 81 mg oral tablet: 1 tab(s) orally once a day (06 Feb 2017 12:18)  carvedilol 6.25 mg oral tablet: 1 tab(s) orally 2 times a day (06 Feb 2017 12:18)  escitalopram 10 mg oral tablet: 1 tab(s) orally once a day (06 Feb 2017 12:18)  furosemide 40 mg oral tablet: 1 tab(s) orally once a day (07 Feb 2017 10:35)  lisinopril 2.5 mg oral tablet: 1 tab(s) orally once a day (07 Feb 2017 10:35)  simvastatin 40 mg oral tablet: 1 tab(s) orally once a day (at bedtime) (06 Feb 2017 12:18)    Allergies: Allergies    penicillin (Anaphylaxis)  penicillins (Unknown)    Intolerances      Soc:   Advanced Directives: Presumed Full Code     CURRENT MEDICATIONS:   --------------------------------------------------------------------------------------  Neurologic Medications    Respiratory Medications    Cardiovascular Medications    Gastrointestinal Medications    Genitourinary Medications    Hematologic/Oncologic Medications    Antimicrobial/Immunologic Medications    Endocrine/Metabolic Medications    Topical/Other Medications    --------------------------------------------------------------------------------------    VITAL SIGNS, INS/OUTS (last 24 hours):  --------------------------------------------------------------------------------------  ICU Vital Signs Last 24 Hrs  T(C): 37.2 (23 Sep 2023 18:23), Max: 37.2 (23 Sep 2023 18:23)  T(F): 98.9 (23 Sep 2023 18:23), Max: 98.9 (23 Sep 2023 18:23)  HR: 75 (23 Sep 2023 20:52) (75 - 83)  BP: 146/70 (23 Sep 2023 20:52) (117/75 - 146/70)  BP(mean): --  ABP: --  ABP(mean): --  RR: 18 (23 Sep 2023 20:52) (17 - 18)  SpO2: 97% (23 Sep 2023 20:52) (97% - 100%)    O2 Parameters below as of 23 Sep 2023 20:52  Patient On (Oxygen Delivery Method): room air          I&O's Summary    --------------------------------------------------------------------------------------    PHYSICAL EXAM:  GENERAL: NAD  HEAD:  R forehead laceration closed and with steristrips, no active bleeding, no skull depressions  EYES: EOMI, PERRLA, conjunctiva and sclera clear  NECK: Supple, No JVD  CHEST/LUNG: non-labored breathing on room air.   HEART: Regular rate and rhythm; S1/S2  ABDOMEN: Soft, Nontender, Nondistended  VASCULAR: Normal pulses, Normal capillary refill  EXTREMITIES:  No deformities, R femur tenderness. Moving all extremities with full ROM. BLE edema, wrapped to mid shin.   SKIN: Warm    LABS  --------------------------------------------------------------------------------------  Labs:  CAPILLARY BLOOD GLUCOSE                              10.8   5.66  )-----------( 168      ( 23 Sep 2023 18:49 )             34.5       Auto Neutrophil %: 71.9 % (09-23-23 @ 18:49)  Auto Immature Granulocyte %: 0.5 % (09-23-23 @ 18:49)    09-23    140  |  100  |  27.7<H>  ----------------------------<  106<H>  3.5   |  27.0  |  0.70      Calcium: 8.7 mg/dL (09-23-23 @ 18:49)      LFTs:         Coags:     12.1   ----< 1.09    ( 23 Sep 2023 18:49 )     26.6                Urinalysis Basic - ( 23 Sep 2023 18:49 )    Color: x / Appearance: x / SG: x / pH: x  Gluc: 106 mg/dL / Ketone: x  / Bili: x / Urobili: x   Blood: x / Protein: x / Nitrite: x   Leuk Esterase: x / RBC: x / WBC x   Sq Epi: x / Non Sq Epi: x / Bacteria: x          --------------------------------------------------------------------------------------

## 2023-09-23 NOTE — ED PROVIDER NOTE - PHYSICAL EXAMINATION
Gen: NAD  Head: R frontal scalp lac, sutured, no active bleeding  Neck: trachea midline  Card: regular rate and rhythm  Resp:  CTAB  Abd: soft, non-distended, non-tender  Ext: no deformities above reported baseline  Neuro:  A&O, no motor or sensory deficits above reported baseline  Skin:  Warm and dry as visualized  Psych:  Normal affect and mood

## 2023-09-23 NOTE — ED ADULT NURSE NOTE - NSFALLHARMRISKINTERV_ED_ALL_ED

## 2023-09-23 NOTE — ED ADULT NURSE NOTE - CHIEF COMPLAINT QUOTE
Patient BIBA from [facility] for unwitnessed fall. Sustained laceration to right eyebrow. Patient with hx of dementia. Unknown if she had LOC. Takes ASA PRN per EMS.

## 2023-09-23 NOTE — CONSULT NOTE ADULT - ASSESSMENT
-repeat CTH in 6 hours 89yF PMH HTN, hospice, dementia, transferred to [facility] from NH s/p fall. Pt does not remember event. CTH subarachnoid hemorrhage along the right frontoparietal lobes. Neurosurgery called to evaluate.     -repeat CTH in 6 hours  -pain control as needed, avoid over sedation  -hold ASA  -attending to see

## 2023-09-23 NOTE — ED PROVIDER NOTE - ATTENDING CONTRIBUTION TO CARE
I have personally provided _45__ minutes of critical care time exclusive of time spent on separately billable procedures. Time includes review of laboratory data, radiology results, discussion with consultants, and monitoring for potential decompensation. Interventions were performed as documented above       I personally saw the patient with the resident, and completed the key components of the history and physical exam. I then discussed the management plan with the resident. I have personally provided _45__ minutes of critical care time exclusive of time spent on separately billable procedures. Time includes review of laboratory data, radiology results, discussion with consultants, and monitoring for potential decompensation. Interventions were performed as documented above       I personally saw the patient with the resident, and completed the key components of the history and physical exam. I then discussed the management plan with the resident.    I, Haroldo Ybarra, performed the initial face to face bedside interview with this patient regarding history of present illness, review of symptoms and relevant past medical, social and family history.  I completed an independent physical examination.  I was the initial provider who evaluated this patient. I have signed out the follow up of any pending tests (i.e. labs, radiological studies) to the resident.  I have communicated the patient’s plan of care and disposition with the resident

## 2023-09-24 VITALS
HEART RATE: 74 BPM | RESPIRATION RATE: 16 BRPM | OXYGEN SATURATION: 98 % | DIASTOLIC BLOOD PRESSURE: 72 MMHG | SYSTOLIC BLOOD PRESSURE: 158 MMHG

## 2023-09-24 PROBLEM — I21.4 NON-ST ELEVATION (NSTEMI) MYOCARDIAL INFARCTION: Chronic | Status: ACTIVE | Noted: 2017-01-30

## 2023-09-24 PROBLEM — J45.40 MODERATE PERSISTENT ASTHMA, UNCOMPLICATED: Chronic | Status: ACTIVE | Noted: 2017-01-30

## 2023-09-24 PROBLEM — E78.5 HYPERLIPIDEMIA, UNSPECIFIED: Chronic | Status: ACTIVE | Noted: 2017-01-30

## 2023-09-24 PROBLEM — F41.9 ANXIETY DISORDER, UNSPECIFIED: Chronic | Status: ACTIVE | Noted: 2017-01-30

## 2023-09-24 PROBLEM — I10 ESSENTIAL (PRIMARY) HYPERTENSION: Chronic | Status: ACTIVE | Noted: 2017-01-30

## 2023-09-24 PROCEDURE — 71045 X-RAY EXAM CHEST 1 VIEW: CPT

## 2023-09-24 PROCEDURE — 70450 CT HEAD/BRAIN W/O DYE: CPT | Mod: MG

## 2023-09-24 PROCEDURE — 86900 BLOOD TYPING SEROLOGIC ABO: CPT

## 2023-09-24 PROCEDURE — 93005 ELECTROCARDIOGRAM TRACING: CPT

## 2023-09-24 PROCEDURE — 96372 THER/PROPH/DIAG INJ SC/IM: CPT | Mod: XU

## 2023-09-24 PROCEDURE — G1004: CPT

## 2023-09-24 PROCEDURE — 72170 X-RAY EXAM OF PELVIS: CPT

## 2023-09-24 PROCEDURE — 70450 CT HEAD/BRAIN W/O DYE: CPT | Mod: 26,MG

## 2023-09-24 PROCEDURE — 99291 CRITICAL CARE FIRST HOUR: CPT | Mod: 25

## 2023-09-24 PROCEDURE — 96374 THER/PROPH/DIAG INJ IV PUSH: CPT

## 2023-09-24 PROCEDURE — 86901 BLOOD TYPING SEROLOGIC RH(D): CPT

## 2023-09-24 PROCEDURE — 80048 BASIC METABOLIC PNL TOTAL CA: CPT

## 2023-09-24 PROCEDURE — 36415 COLL VENOUS BLD VENIPUNCTURE: CPT

## 2023-09-24 PROCEDURE — 85730 THROMBOPLASTIN TIME PARTIAL: CPT

## 2023-09-24 PROCEDURE — 85610 PROTHROMBIN TIME: CPT

## 2023-09-24 PROCEDURE — 85025 COMPLETE CBC W/AUTO DIFF WBC: CPT

## 2023-09-24 PROCEDURE — 73552 X-RAY EXAM OF FEMUR 2/>: CPT

## 2023-09-24 PROCEDURE — 86850 RBC ANTIBODY SCREEN: CPT

## 2023-09-24 RX ORDER — HALOPERIDOL DECANOATE 100 MG/ML
5 INJECTION INTRAMUSCULAR ONCE
Refills: 0 | Status: COMPLETED | OUTPATIENT
Start: 2023-09-24 | End: 2023-09-24

## 2023-09-24 RX ADMIN — Medication 2 MILLIGRAM(S): at 02:05

## 2023-09-24 RX ADMIN — HALOPERIDOL DECANOATE 5 MILLIGRAM(S): 100 INJECTION INTRAMUSCULAR at 03:59

## 2023-09-24 NOTE — CHART NOTE - NSCHARTNOTEFT_GEN_A_CORE
Images reviewed  No acute neurosurgical intervention recommended   pain control as needed, avoid over sedation  no neurosurgical contraindication for discharge  neurosurgery signing off  reconsult prn  discussed w/ Dr. Steward

## 2023-09-24 NOTE — ED ADULT NURSE REASSESSMENT NOTE - NS ED NURSE REASSESS COMMENT FT1
Assumed care of pt @ 2330. PT A&Ox2. PT resting comfortably in stretcher. PT noted to have stitches to the right eye. No complaints of pain at this time. Pts daughter at the bedside. Cardiac monitor in place. Will continue to monitor.
Neuro sgy at bedside speaking w/pt and daughter.
PT is confused and pulling off all equipment. PT does not know where she is or why she is here. PT hitting staff.  Dr. Macias at bedside and became concerned of delirium, Dr. Macias gave verbal order for Ativan 2mg IVP.
[facility] here to pick pt up.
PT medicated as per MD orders for agitation and care provided.  New IV placed 20 gauge to left AC, tolerated well.

## 2023-09-24 NOTE — ED ADULT NURSE REASSESSMENT NOTE - NURSING NEURO LEVEL OF CONSCIOUSNESS
alert and awake
alert and awake/follows commands
alert and awake/follows commands
alert and awake
alert and awake/follows commands
alert and awake
alert and awake/follows commands
alert and awake
alert and awake/follows commands

## 2023-09-24 NOTE — ED ADULT NURSE REASSESSMENT NOTE - NURSING NEURO ORIENTATION
disoriented to person/disoriented to place
disoriented to person/disoriented to place
oriented to person, place and time
disoriented to person/disoriented to place
disoriented to time/situation
disoriented to person/disoriented to place
disoriented to time/situation
disoriented to person/disoriented to time/situation
disoriented to person/disoriented to place

## 2023-09-24 NOTE — ED ADULT NURSE REASSESSMENT NOTE - NEURO BEHAVIOR
calm
calm/cooperative
calm
strength equal in bilateral upper and lower extremities/calm
agitated
agitated

## 2023-09-24 NOTE — ED ADULT NURSE REASSESSMENT NOTE - NEURO MENTATION
confused
memory loss, short term
memory loss, short term
confused
memory loss, short term
confused
confused

## 2024-02-06 NOTE — CONSULT NOTE ADULT - SUBJECTIVE AND OBJECTIVE BOX
Summary: Unmedicated delivery, exclusive breastfeeding in the hospital but consistent nipple pain, creasing and blanching of the nipple. Infant at 9.3% weight loss today, alert and crying for feedings. No pumping, pacifiers or bottles over these 4 days.   Today: Mom latches beautifully and gently but feels pain that doesn't resolve with sucking. Deeper latch had minimal improvement. Used a Medela 24mm nipple shield and baby maintained the latch and transferred 12ml of milk. To the left side which is  easier for her and  still some pain with a deeper asymmetrical latch and no milk transfer in football position. Applied the nipple shield, immediate easy latch and removed another 12ml, total intake 24ml, not 21ml that I initially calculated.  Pumped with her Spectra and removed 20ml of deep aceves colostrum. Will save for later feeding today.  Plan: Continue with offering the breast at each feeding, planning minimum of 9 feedings per day, one feeding dad will give and mom will sleep. Pump after every other feeding to evaluate her ability, empty fully to increase supply and have milk for a night bottle for dad to provide.  All milk goes back in 24 hours.  Nipple shield is used to guard moms nipple but also provide sensory input for infant sucking. May always try bare breast but stop of painful at all. Nipple care not addressed, would recommend comfort gels to both breasts to provide moisture for healing. Dry wound healing is not helpful. There are no open sored so silverettes not recommended.   Follow up:   Lactation appointment: weoght check 24 and  24 9am for 1:1  Baby 's Provider appointment: 14 Day Well Child Check   Referrals: None    Maternal Diagnosis/Problem:  Sore Nipple(s) and Lactation Problem  Infant Diagnosis/Problem:   difficulties feeding at the breast     Subjective:     Ceci Griffin is a 33 y.o. female here for lactation care. She is here today with her baby and   (Quinten).    Concerns:   Maternal: Latch on difficulties , Nipple pain , Weight check, Infant feeding evaluation, and Breastfeeding questions   Infant: Slow weight gain , sleepy last night,     HPI:   Pertinent  history:  41.0 weeks    Mother does not have a history of advanced maternal age, GDM, hypertension prior to pregnancy, GHTN, insulin resistance, multiple gestation, PCOS, thyroid disease, auto immune disease , placenta encapsulation, and breast surgery    Breast changes in pregnancy: Yes  Breast surgeries: No    FEEDING HISTORY:    Previous Breastfeeding History: First baby.   Hospital Course: Sore nipples, seen by Lactation Chaz   Currently 2024: Unmedicated delivery, exclusive breastfeeding in the hospital but consistent nipple pain, creasing and blanching of the nipple. Infant at 9.3% weight loss today, alert and crying for feedings. No pumping, pacifiers or bottles over these 4 days.      Both breasts: Yes  Supplement: None   Bottle type: Has Spectra, Dr. Brown and Александр    Breast Pumping:  Not Pumping   Type of Pump: Spectra  Flange size/type: 24mm and 28mm hasn't used it yet    Maternal ROS:  Constitutional: No fever, chills. Feeling well  Breasts: No soreness of breasts and Soreness of nipples  Psychiatric: No mood changes, tired  Mental Health: No mention of feeling irritable, agitated, angry, overwhelmed, apathetic, appetite changes, exhausted nor having sleep changes outside infant feeds/demands.  Current Outpatient Medications on File Prior to Visit   Medication Sig Dispense Refill    ibuprofen (MOTRIN) 800 MG Tab Take 1 Tablet by mouth every 8 hours as needed for Moderate Pain. 30 Tablet 0     No current facility-administered medications on file prior to visit.      Past Medical History:   Diagnosis Date    GERD (gastroesophageal reflux disease)          Objective:     Maternal Physical Lactation Exam  General: No acute distress  Breasts: Symmetrical , Filling, Plugged Duct - no  85y year old Female admitted for Patient is a 85y old  Female who presents with a chief complaint of SOB (05 Jul 2019 01:03)          Brief Hospital Course: 86 yo F with history of asthma with chronic asthmatic bronchitis, anxiety, MDD, hypokalemia, LE edema, BIBEMS for SOB, treated with solumedrol and nebs by EMS, upon arival to ED she was lethargic with AMS, wheezing with ABG 7.16/75/172/20/99%, she was given lasix, albuterol and placed on bipap with improvement in gas and mental status.  Per patient she was feeling SOB this morning upon waking up and was treated with her nebs, she continued to be short of breath.  She did not have any fever, chills or sick contacts, she did have a non productive cough but said she has a chronic cough.  She does not remember what happened immediately prior to being picked up by EMS and taken to ED. Currently she feels better, no chest pain, pleuritic pain, fever/chills, NVD.  Shortness of breath much improved.         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  Anxiety disorder  Major depressive disorder  Bronchitis  Cognitive communication deficit  Muscle weakness (generalized)  Insomnia  Hypokalemia  Edema  Essential (primary) hypertension  Unspecified diastolic (congestive) heart failure  Acute respiratory failure with hypercapnia  Preventative health care  Hypokalemia  Asthma, unspecified asthma severity, unspecified whether complicated, unspecified whether persistent  Acute respiratory failure with hypercapnia  No significant past surgical history  RESPIRATORY DISTRESS        MEDICATIONS  (STANDING):  ALBUTerol/ipratropium for Nebulization 3 milliLiter(s) Nebulizer every 6 hours  carvedilol 6.25 milliGRAM(s) Oral every 12 hours  enoxaparin Injectable 40 milliGRAM(s) SubCutaneous daily  lactated ringers Bolus 250 milliLiter(s) IV Bolus once  levoFLOXacin IVPB 750 milliGRAM(s) IV Intermittent once  levoFLOXacin IVPB      melatonin 5 milliGRAM(s) Oral at bedtime  methylPREDNISolone sodium succinate Injectable 40 milliGRAM(s) IV Push every 12 hours    MEDICATIONS  (PRN):      I&O's Summary    ICU Vital Signs Last 24 Hrs  T(C): 36.7 (05 Jul 2019 00:05), Max: 36.7 (04 Jul 2019 20:43)  T(F): 98.1 (05 Jul 2019 00:05), Max: 98.1 (05 Jul 2019 00:05)  HR: 95 (05 Jul 2019 00:05) (95 - 117)  BP: 127/71 (05 Jul 2019 00:05) (122/66 - 189/105)  BP(mean): --  ABP: --  ABP(mean): --  RR: 22 (05 Jul 2019 00:05) (22 - 30)  SpO2: 98% (05 Jul 2019 00:05) (92% - 98%)      PHYSICAL EXAM:  General: NAD conversant on bipap  CV: s1s2 systolic murmur, tachy 102  pulm: bilateral wheezing, louder on left.  GI: soft NTND   Extremities: lymphedema b/l LE  POCUS: IVC less then 1cm, RV nomal, LV hyperdynamic, probably LVH.       07-04    138  |  104  |  26<H>  ----------------------------<  250<H>  3.9   |  27  |  0.77    Ca    8.4<L>      04 Jul 2019 21:04  Mg     2.1     07-04    TPro  6.6  /  Alb  3.5  /  TBili  0.7  /  DBili  x   /  AST  16  /  ALT  20  /  AlkPhos  121<H>  07-04                          14.9   20.51 )-----------( 193      ( 04 Jul 2019 21:04 )             45.8     ABG - ( 04 Jul 2019 22:03 )  pH, Arterial: 7.41  pH, Blood: x     /  pCO2: 36    /  pO2: 56    / HCO3: 23    / Base Excess: -1.7  /  SaO2: 90                CARDIAC MARKERS ( 04 Jul 2019 21:04 )  .066 ng/mL / x     / x     / x     / x          86 yo F with history of asthma with chronic asthmatic bronchitis, anxiety, MDD, hypokalemia, LE edema, from [facility] for hypercapnic respiratory failure likely from asthma exacerbation, mental status and ABG improved with bipap, nebs and steroids.  has cough but CXR clear.    - Comibivent q6h  - Continue bipap, most recent ABG 7.41/36/56/23/60%.  lower IPAP from 18 to 16, increase fi02 from 40-60%  - check ABG in AM  - She is on prednisone 40mg daily at home. Start Methylprednisolone 40 IV q12.    - WBC 20 with elevated PCT, pan culture, start levaquin. RVP panel  - IVC less then 1cm and hyperdynamic LV with possible HOCM.  Hold lasix, give 250 bolus.   - Mildly elevated troponins          Plan: evidence, and Mastitis  - no S/S  Nipples: abraded and erythematous  Psychiatric: Normal mood and affect. Her behavior is normal. Judgment and thought content normal.  Mental Health: Did NOT exhibit sadness, crying, feeling overwhelmed, agitation or hypervigilance.    Infant Oral Observation   2/6/2024  Oral: Tongue lift 30%, Tongue extension to gum line, Lingual frenum appearance Coryllos type not evaluated with lift, not seen with crying but lift is low.  Assessment/Plan & Lactation Counseling:     Infant Exam on Infant Chart    Infant Weight History:   2/2/24  5#15.4oz  2/6/2024 5#6.5oz    Infant Intake at Breast:      Total: 24ml  Milk Transfer at this feeding:   Effective breastfeeding at 55% of available milk with nipple shield, good start  Pumped:   Type of Pump: Spectra   Quantity Pumped:    Total: 20ml  Initiation of Feeding: Infant initiates  Attachment Achieved: rapidly  Nipple shield:     Size: 24mm       Introduced 2/6/2024  Latch achieved? Yes and milk transferred without pain  Difficult Latch without pain since birth likely due to:   Sensory inhibition  Infant learning  Almost SGA  Suck Pattern at the Breast: Suck burst and normal rest  Suck Pattern on the Bottle: Not Indicated   Behavior Following Observed Feeding: content  Nipple Pain From:Contact forces of the tongue causing nipple strain resulting in damage     2/6/2024 Latch: Mom latches independently, Assisted latch, and Latch difficulty without nipple shield  Suckling/Feeding: attaches, baby roots, elicits MINDY, and rhythmic with NS more nutritive sucking  Sucking strength: Moderate Strong  Sucking Rhythm Coordinated   Compression: WNL    Once latched with NS, baby fell into a more mature and integrated SSB pattern.    Swallowing No difficulty noted  If functional feeding, it is quiet, rhythmic, coordinated, organized, effeicent safe, satisfying and pleasurable for both parent and baby? Learning  Milk Supply Available: Building    MATERNAL  PERSONALIZED BREASTFEEDING PLAN  (Babies and parents change and adapt  or mal-adapt, to each other, so a plan today is only as good as it facilitates the families goals, follow up is key in a dynamic process as breastfeeding.)  Discussed concerns and symptoms as listed above in assessment and guidance summarized below. Shared decision making was used between myself and the family for this encounter, home care, and follow up. Topics advised, taught and or reviewed included:   4th Trimester: The 12-week period immediately after mom has had the baby. Not everyone has heard of it, but every mother and their  baby will go through it. It is a time of great physical and emotional change as the baby adjusts to being outside the womb, and the family adjusts to new life as parents  During the fourth trimester, one can expect fussiness and crying from the baby and very likely exhaustion for the family.  babies are learning to adjust to life outside the womb where it was warm and squishy!  There is a lot of misinformation about babies and their needs, and parents are often encouraged to ignore baby's signals. Bad idea. Babies are “half-baked” at birth and have much to learn with the help of physical and emotional support from caregivers. Taking care of a baby's needs is an investment that pays off with a happier, healthier child and adult.  It can take weeks or even months for your body to feel totally normal again. There is a major hormonal upheaval experienced by moms in the first few weeks after birth, because their bodies are shifting from many pregnancy hormones to a more normal hormonal make-up.  These first three months with baby earth side is a delicate time. Honor it with a mindful dose of support. Mindful Mamma's is an delfino that may help.   Self-Compassion through Relational Support and Interaction.   Be kind to ourself. This is the first step in reducing anxiety and depression. Pay attention to how you  "are doing.   What do you need? Food, Rest, Sleep, Support, to Laugh/giggle: who will you ask today? They want to help you. We want to help you.   How do you stop your self-blame, or your self-criticism?   Get out of the house each day, walk or drive somewhere or ask a friend to drive you,  a \"treat\" at a drive through.   Mood and Anxiety Disorders: Having a new baby can be joyful time for some new moms, but a difficult time for others. For all, it is a time of profound physical and emotional change. Balancing baby, family, partners and friends, work, pets, and preexisting or new life stressors as well as sleep deprivation can be extremely challenging. Untreated depression, sleep exhaustion, and anxiety are each a challenge that must be addressed and in addition can contribute to early cessation of breastfeeding. It is important both for the mental health and physical health of new moms and babies to get on the path to feeling better and if therapeutic support is needed, specific resources are available.   Sleep or Lack of: Human Giver Syndrome (moms) tells us it’s “self-indulgent” to rest and sleep, which makes as much sense as believing it’s weak or self indulgent to breathe. We discussed strategies to manage restorative sleep, although short amounts, significant to the mental health of the mother. The principle follows:  Mom goes to sleep right after 8pm +/- feeding  Partner covers first late evening feeding (10pm/11pm), settles baby,  then goes to bed  Mom covers next feeding 1am /2am, partner sleeps  Next feeding share  The baby born lower birth weight, at term. These babies often have weak suction pressures and immature sleep-wake regulation that place them at risk for under consumption of milk during breastfeeding. Mothers of small babies are at risk for delayed onset of lactation, such that the availability of adequate milk occurs later and less predictably than for healthy term births. " Therefore,  supply must be supported as well as infant growth.   Milk Supply is dependent on glandular tissue development, hormonal influences, how many times the baby removes milk and how well the breasts are emptied in a 24 hour period. This is a biological reality that we can NOT work around. If, for any reason, your baby is not latching, or you are not able to nurse, then it is important for you to remove the milk instead by pumping or hand expression.  There's no magic trick, tea, food, drink, cookie or supplement that will increase your milk supply. One  must  effectively remove milk to continue to make and maximize milk. In the early days and weeks that can be 8+ times in 24 hours. For older babies, on average 6-7 + times in 24 hours.    Hydration: Staying hydrated is important however lack of hydration is usually not a cause of significantly low milk production.  Everyone needs a different amount of water, depending on their activity and diet. A high salt and/or high-protein diet, high physical activity, or very warm weather/sweating will require more fluids. A person eating a diet high in veggies and fruit, with a lack of physical activity will require fewer fluids. There is no magic number for the # of ounces of water each day.The best way to know that you are well hydrated is by looking at your urine.  Urine should look clear to light pale yellow, and you should need to pee at least every 3 to 4 hours unless you have a large bladder capacity.  Herbs and Medications: A galactagogue is an herb or medication taken by a breastfeeding mother to increase her milk supply. We know that for centuries mothers around the world have sought out remedies to increase their milk supply. However, there is limited research on the safety and effectiveness of herbal galactagogues, which makes it hard for us to endorse them. It is not known if any of these herbs are truly effective, and it is difficult to predict how a  85y year old Female admitted for Patient is a 85y old  Female who presents with a chief complaint of SOB (05 Jul 2019 01:03)          Brief Hospital Course: 86 yo F with history of asthma with chronic asthmatic bronchitis, anxiety, MDD, hypokalemia, LE edema, BIBEMS for SOB, treated with solumedrol and nebs by EMS, upon arival to ED she was lethargic with AMS, wheezing with ABG 7.16/75/172/20/99%, she was given lasix, albuterol and placed on bipap with improvement in gas and mental status.  Per patient she was feeling SOB this morning upon waking up and was treated with her nebs, she continued to be short of breath.  She did not have any fever, chills or sick contacts, she did have a non productive cough but said she has a chronic cough.  She does not remember what happened immediately prior to being picked up by EMS and taken to ED. Currently she feels better, no chest pain, pleuritic pain, fever/chills, NVD.  Shortness of breath much improved.         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  Anxiety disorder  Major depressive disorder  Bronchitis  Cognitive communication deficit  Muscle weakness (generalized)  Insomnia  Hypokalemia  Edema  Essential (primary) hypertension  Unspecified diastolic (congestive) heart failure  Acute respiratory failure with hypercapnia  Preventative health care  Hypokalemia  Asthma, unspecified asthma severity, unspecified whether complicated, unspecified whether persistent  Acute respiratory failure with hypercapnia  No significant past surgical history  RESPIRATORY DISTRESS        MEDICATIONS  (STANDING):  ALBUTerol/ipratropium for Nebulization 3 milliLiter(s) Nebulizer every 6 hours  carvedilol 6.25 milliGRAM(s) Oral every 12 hours  enoxaparin Injectable 40 milliGRAM(s) SubCutaneous daily  lactated ringers Bolus 250 milliLiter(s) IV Bolus once  levoFLOXacin IVPB 750 milliGRAM(s) IV Intermittent once  levoFLOXacin IVPB      melatonin 5 milliGRAM(s) Oral at bedtime  methylPREDNISolone sodium succinate Injectable 40 milliGRAM(s) IV Push every 12 hours    MEDICATIONS  (PRN):      I&O's Summary    ICU Vital Signs Last 24 Hrs  T(C): 36.7 (05 Jul 2019 00:05), Max: 36.7 (04 Jul 2019 20:43)  T(F): 98.1 (05 Jul 2019 00:05), Max: 98.1 (05 Jul 2019 00:05)  HR: 95 (05 Jul 2019 00:05) (95 - 117)  BP: 127/71 (05 Jul 2019 00:05) (122/66 - 189/105)  BP(mean): --  ABP: --  ABP(mean): --  RR: 22 (05 Jul 2019 00:05) (22 - 30)  SpO2: 98% (05 Jul 2019 00:05) (92% - 98%)      PHYSICAL EXAM:  General: NAD conversant on bipap  CV: s1s2 systolic murmur, tachy 102  pulm: bilateral wheezing, louder on left.  GI: soft NTND   Extremities: lymphedema b/l LE  POCUS: IVC less then 1cm, RV nomal, LV hyperdynamic, probably LVH.       07-04    138  |  104  |  26<H>  ----------------------------<  250<H>  3.9   |  27  |  0.77    Ca    8.4<L>      04 Jul 2019 21:04  Mg     2.1     07-04    TPro  6.6  /  Alb  3.5  /  TBili  0.7  /  DBili  x   /  AST  16  /  ALT  20  /  AlkPhos  121<H>  07-04                          14.9   20.51 )-----------( 193      ( 04 Jul 2019 21:04 )             45.8     ABG - ( 04 Jul 2019 22:03 )  pH, Arterial: 7.41  pH, Blood: x     /  pCO2: 36    /  pO2: 56    / HCO3: 23    / Base Excess: -1.7  /  SaO2: 90                CARDIAC MARKERS ( 04 Jul 2019 21:04 )  .066 ng/mL / x     / x     / x     / x          86 yo F with history of asthma with chronic asthmatic bronchitis, anxiety, MDD, hypokalemia, LE edema, from [facility] for hypercapnic respiratory failure likely from asthma exacerbation, mental status and ABG improved with bipap, nebs and steroids.  has cough but CXR clear.    - Continue bipap, most recent ABG 7.41/36/56/23/60%.  lower IPAP from 18 to 16, increase fi02 from 40-60%  - Combivent q6h  - She is on prednisone 40mg daily at home. Start Methylprednisolone 40 IV q12.    - WBC 20 with elevated PCT, pan culture, start levaquin. RVP panel  - NSTEMI- Lopez ..83 from .06 with ST depressions in II III AVF, start aspirin, metoprolol, heparin gtt, cardiology consult.  Keep K+ >4 Mg >2. continue to trend CE  - Echocardiogram  -IVC less then 1cm and hyperdynamic LV with possible HOCM.  Hold lasix, give 250 cc bolus  - specific herbal galactagogue will affect an individual, requiring “trial and error” in many situations. When effective, results are generally seen within 24-72 hours of starting an herbal galactagogue. Many of these herbs are used to decrease high blood sugar. If you are diabetic or have problems with low blood sugar, or thyroid disease you may not be a candidate for herbs. Not all women can increase their low milk supply with a galactagogue due to the many underlying causes of low milk production.  When taking a galactagogue, remember that frequent milk removal is still the most effective way to increase supply.   Feeding:   Infant difficulty with feeding, slow growth. Guidelines to follow:  Feed your baby every 1.5-3 hours, more often if baby acts hungry.   Awaken baby for feeding if going over 3 hours in the day.   Until back to birth weight, ONE four hour at night is acceptable if has had 8 prior feedings in 24 hours.    Daily goal: 9-12 feedings per 24 hours.   Supplement:   Supplement with expressed milk  Whatever is pumped, return in 24 hours, no freezing milk.  Weight check in 3 days will redirect pumping and bottles  Pacing the feeding:  A slow flow nipple helps, but how you feed the baby is more important.  How you are  positioning the bottle can compensate for a faster flow nipple.  When bottle-feeding, the baby should control how much is consumed at a feeding.  Holding the baby in an upright position with the bottle horizontal ensures that the baby gets milk only when sucking.  Here is a nice video demonstrating this concept of paced bottle feeding,  https://www.youtube.com/watch?v=AsLWJ3fYW4D Think baby led, not parent led.  Nipple shield (NS): We prefer the 24mm Medela.   Before applying, roll the shield in on itself (like a sombrero, and allow breast to be pulled  in to the shield tip).   The latch is very different from the bare breast, bring the baby to you and let them find the nipple shield and  85y year old Female admitted for Patient is a 85y old  Female who presents with a chief complaint of SOB (05 Jul 2019 01:03)          Brief Hospital Course: 84 yo F with history of asthma with chronic asthmatic bronchitis, anxiety, MDD, hypokalemia, LE edema, BIBEMS for SOB, treated with solumedrol and nebs by EMS, upon arival to ED she was lethargic with AMS, wheezing with ABG 7.16/75/172/20/99%, she was given lasix, albuterol and placed on bipap with improvement in gas and mental status.  Per patient she was feeling SOB this morning upon waking up and was treated with her nebs, she continued to be short of breath.  She did not have any fever, chills or sick contacts, she did have a non productive cough but said she has a chronic cough.  She does not remember what happened immediately prior to being picked up by EMS and taken to ED. Currently she feels better, no chest pain, pleuritic pain, fever/chills, NVD.  Shortness of breath much improved.         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  Anxiety disorder  Major depressive disorder  Bronchitis  Cognitive communication deficit  Muscle weakness (generalized)  Insomnia  Hypokalemia  Edema  Essential (primary) hypertension  Unspecified diastolic (congestive) heart failure  Acute respiratory failure with hypercapnia  Preventative health care  Hypokalemia  Asthma, unspecified asthma severity, unspecified whether complicated, unspecified whether persistent  Acute respiratory failure with hypercapnia  No significant past surgical history  RESPIRATORY DISTRESS        MEDICATIONS  (STANDING):  ALBUTerol/ipratropium for Nebulization 3 milliLiter(s) Nebulizer every 6 hours  carvedilol 6.25 milliGRAM(s) Oral every 12 hours  enoxaparin Injectable 40 milliGRAM(s) SubCutaneous daily  lactated ringers Bolus 250 milliLiter(s) IV Bolus once  levoFLOXacin IVPB 750 milliGRAM(s) IV Intermittent once  levoFLOXacin IVPB      melatonin 5 milliGRAM(s) Oral at bedtime  methylPREDNISolone sodium succinate Injectable 40 milliGRAM(s) IV Push every 12 hours    MEDICATIONS  (PRN):      I&O's Summary    ICU Vital Signs Last 24 Hrs  T(C): 36.7 (05 Jul 2019 00:05), Max: 36.7 (04 Jul 2019 20:43)  T(F): 98.1 (05 Jul 2019 00:05), Max: 98.1 (05 Jul 2019 00:05)  HR: 95 (05 Jul 2019 00:05) (95 - 117)  BP: 127/71 (05 Jul 2019 00:05) (122/66 - 189/105)  BP(mean): --  ABP: --  ABP(mean): --  RR: 22 (05 Jul 2019 00:05) (22 - 30)  SpO2: 98% (05 Jul 2019 00:05) (92% - 98%)      PHYSICAL EXAM:  General: NAD conversant on bipap  CV: s1s2 systolic murmur, tachy 102  pulm: bilateral wheezing, louder on left.  GI: soft NTND   Extremities: lymphedema b/l LE  POCUS: IVC less then 1cm, RV nomal, LV hyperdynamic, probably LVH.       07-04    138  |  104  |  26<H>  ----------------------------<  250<H>  3.9   |  27  |  0.77    Ca    8.4<L>      04 Jul 2019 21:04  Mg     2.1     07-04    TPro  6.6  /  Alb  3.5  /  TBili  0.7  /  DBili  x   /  AST  16  /  ALT  20  /  AlkPhos  121<H>  07-04                          14.9   20.51 )-----------( 193      ( 04 Jul 2019 21:04 )             45.8     ABG - ( 04 Jul 2019 22:03 )  pH, Arterial: 7.41  pH, Blood: x     /  pCO2: 36    /  pO2: 56    / HCO3: 23    / Base Excess: -1.7  /  SaO2: 90                CARDIAC MARKERS ( 04 Jul 2019 21:04 )  .066 ng/mL / x     / x     / x     / x          84 yo F with history of asthma with chronic asthmatic bronchitis, anxiety, MDD, hypokalemia, LE edema, from [facility] for hypercapnic respiratory failure likely from asthma exacerbation, mental status and ABG improved with bipap, nebs and steroids.  has cough but CXR clear.    - Continue bipap, most recent ABG 7.41/36/56/23/60%.  lower IPAP from 18 to 16, increase fi02 from 40-60%  - Combivent q6h  - She is on prednisone 40mg daily at home. Start Methylprednisolone 40 IV q12.    - WBC 20 with elevated PCT, pan culture, start levaquin. RVP panel  - NSTEMI- Lopez ..83 from .06 with ST depressions in II III AVF, start aspirin, metoprolol, heparin gtt, cardiology consult.  Keep K+ >4 Mg >2. continue to trend CE  - Echocardiogram  -IVC less then 1cm and hyperdynamic LV with possible HOCM.  Hold lasix, give 250 cc bolus  -     Discussed with Dr. Dago ALEXANDER they will manage it, tuck them close once they find it, cheek against breast.   Once you and your baby are familiar with the NS, you may be able to just put it on the breast and latch the baby without any preparation.  Pacifier Use:  The American Academy of Pediatrics' Position Paper reports: Although we recommend a conservative approach regarding pacifier use, we do not endorse a complete ban on the use of pacifiers, nor do we support an approach that induces parental guilt concerning their choices about the use of pacifiers. Pacifier use and breastfeeding in term and  newborns- a systematic review and meta-analysis from the  J of Pediatrics Published online 2022. Has found that when pacifiers are used among individuals who have been counseled on the risks, do not interfere with breastfeeding exclusivity or duration. This is a  parental choice.   Positioning Techniques for Bare Breast  Pillow used: Juanita Balderrama  Suggested positions Cross cradle and Football  Fine tune position by making sure your fingers beneath the breast as well as your bra, are out of the way of your baby's chin.  Positioning: See http://globalhealthmedia.org/portfolio-items/positions-for-breastfeeding/?hjpaodpxoZF=51785  Latch on Techniques for Bare Breast.   Modify for nipple shield use soon enough you will move back to bare breast.  Aim is an asymmetric deep latch.  First make yourself comfortable. Sit with knees bent (unless lying down), feet on a stool if needed. You will support your baby, and pillows will be used to support YOU. You want your baby's belly facing your breast/body (belly to belly). If your baby is extremely fussy and crying, do skin-to-skin for a while until he or she calms down, then try (or try again).   Fine tune latch:  By holding your baby more securely at the breast, assisting your baby to stay attached by:  Support your baby with your hand behind the shoulder blades (NOT THE HEAD).  1.  "Align your baby's NOSE with your nipple  2. Your baby's chin should be the first part of his or her body to touch your breast  3. Tickle the baby's upper lip or nose with your nipple  4. When your baby's mouth is WIDE OPEN, swiftly bring your baby's mouth over your nipple/areola.  Steps 2 and 3 could be slightly reversed order or essentially happening at the same time.  Bringing your baby to your breast, not breast to the baby  Your baby's cheek to touch breast securely, nose tipped back  Hold your baby firmly in place so when your baby forgets to suck and picks it back up again your baby is in the correct spot. You will be extinguishing behavior and replacing it with a deeper latch to stimulate suck and provide satisfaction at the breast.  Your baby needs as much breast as deep in the mouth as possible to allow your nipples to heal and for you more importantly to maximize efficiency at the breast  If that doesn’t help, you should make an appointment with me to re-evaluate.   Latch is asymmetrical, leading with the chin, getting the baby below the breast, as if offering a large \"deli jered sandwich\".  Here is a video from [employer] on the asymmetric latch       https://www.youtube.com/watch?v=0I-ASx0Qd39  Pumping Guidelines:  Both breasts 4 times in 24 hours  Pumping is effective but can quickly exhaust and overwhelm a new mother  Wearable pumps are not recommended to establish breastfeeding or regular use unless hyperlactation.  Bra    Consider a hands free bra - [employer]ishes is recommended.  Type of Pump:  Spectra  Spectra Settings  are set from the consultation today  Press letdown button when first starting         Cycle: 70 / Vacuum: L1 - L 5 (To comfort)  Once milk lets down, press letdown button again  Cycle: 54 / Vacuum: L1 - L12 (To comfort)   Power Pumping is not indicated as the response is not significant or zero over 24 hours  How long will vary woman to woman, typically 8-15 minutes, or 1-2 minutes after " 85y year old Female admitted for Patient is a 85y old  Female who presents with a chief complaint of SOB (05 Jul 2019 01:03)          Brief Hospital Course: 84 yo F with history of asthma with chronic asthmatic bronchitis, anxiety, MDD, hypokalemia, LE edema, BIBEMS for SOB, treated with solumedrol and nebs by EMS, upon arival to ED she was lethargic with AMS, wheezing with ABG 7.16/75/172/20/99%, she was given lasix, albuterol and placed on bipap with improvement in gas and mental status.  Per patient she was feeling SOB this morning upon waking up and was treated with her nebs, she continued to be short of breath.  She did not have any fever, chills or sick contacts, she did have a non productive cough but said she has a chronic cough.  She does not remember what happened immediately prior to being picked up by EMS and taken to ED. Currently she feels better, no chest pain, pleuritic pain, fever/chills, NVD.  Shortness of breath much improved.         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  Anxiety disorder  Major depressive disorder  Bronchitis  Cognitive communication deficit  Muscle weakness (generalized)  Insomnia  Hypokalemia  Edema  Essential (primary) hypertension  Unspecified diastolic (congestive) heart failure  Acute respiratory failure with hypercapnia  Preventative health care  Hypokalemia  Asthma, unspecified asthma severity, unspecified whether complicated, unspecified whether persistent  Acute respiratory failure with hypercapnia  No significant past surgical history  RESPIRATORY DISTRESS        MEDICATIONS  (STANDING):  ALBUTerol/ipratropium for Nebulization 3 milliLiter(s) Nebulizer every 6 hours  carvedilol 6.25 milliGRAM(s) Oral every 12 hours  enoxaparin Injectable 40 milliGRAM(s) SubCutaneous daily  lactated ringers Bolus 250 milliLiter(s) IV Bolus once  levoFLOXacin IVPB 750 milliGRAM(s) IV Intermittent once  levoFLOXacin IVPB      melatonin 5 milliGRAM(s) Oral at bedtime  methylPREDNISolone sodium succinate Injectable 40 milliGRAM(s) IV Push every 12 hours    MEDICATIONS  (PRN):      I&O's Summary    ICU Vital Signs Last 24 Hrs  T(C): 36.7 (05 Jul 2019 00:05), Max: 36.7 (04 Jul 2019 20:43)  T(F): 98.1 (05 Jul 2019 00:05), Max: 98.1 (05 Jul 2019 00:05)  HR: 95 (05 Jul 2019 00:05) (95 - 117)  BP: 127/71 (05 Jul 2019 00:05) (122/66 - 189/105)  BP(mean): --  ABP: --  ABP(mean): --  RR: 22 (05 Jul 2019 00:05) (22 - 30)  SpO2: 98% (05 Jul 2019 00:05) (92% - 98%)      PHYSICAL EXAM:  General: NAD conversant on bipap  CV: s1s2 systolic murmur, tachy 102  pulm: bilateral wheezing, louder on left.  GI: soft NTND   Extremities: lymphedema b/l LE  POCUS: IVC less then 1cm, RV nomal, LV hyperdynamic, probably LVH.       07-04    138  |  104  |  26<H>  ----------------------------<  250<H>  3.9   |  27  |  0.77    Ca    8.4<L>      04 Jul 2019 21:04  Mg     2.1     07-04    TPro  6.6  /  Alb  3.5  /  TBili  0.7  /  DBili  x   /  AST  16  /  ALT  20  /  AlkPhos  121<H>  07-04                          14.9   20.51 )-----------( 193      ( 04 Jul 2019 21:04 )             45.8     ABG - ( 04 Jul 2019 22:03 )  pH, Arterial: 7.41  pH, Blood: x     /  pCO2: 36    /  pO2: 56    / HCO3: 23    / Base Excess: -1.7  /  SaO2: 90                CARDIAC MARKERS ( 04 Jul 2019 21:04 )  .066 ng/mL / x     / x     / x     / x          84 yo F with history of asthma with chronic asthmatic bronchitis, anxiety, MDD, hypokalemia, LE edema, from [facility] for hypercapnic respiratory failure likely from asthma exacerbation, mental status and ABG improved with bipap, nebs and steroids.  has cough but CXR clear.    - Continue bipap, most recent ABG 7.41/36/56/23/60%.  lower IPAP from 18 to 16, increase fi02 from 40-60%  - Combivent q6h standing q2h PRN  - She is on prednisone 40mg daily at home. Start Methylprednisolone 40 IV q12.    - WBC 20 with elevated PCT, pan culture, start levaquin. RVP panel  - NSTEMI- Lopez ..83 from .06 with ST depressions in II III AVF, start aspirin, metoprolol, heparin gtt, cardiology consult.  Keep K+ >4 Mg >2. continue to trend CE  - Echocardiogram  -IVC less then 1cm and hyperdynamic LV.  Hold lasix, give 250 cc bolus     Discussed with Dr. Dago ALEXANDER 85y year old Female admitted for Patient is a 85y old  Female who presents with a chief complaint of SOB (05 Jul 2019 01:03)          Brief Hospital Course: 86 yo F with history of asthma with chronic asthmatic bronchitis, anxiety, MDD, hypokalemia, LE edema, BIBEMS for SOB, treated with solumedrol and nebs by EMS, upon arival to ED she was lethargic with AMS, wheezing with ABG 7.16/75/172/20/99%, she was given lasix, albuterol and placed on bipap with improvement in gas and mental status.  Per patient she was feeling SOB this morning upon waking up and was treated with her nebs, she continued to be short of breath.  She did not have any fever, chills or sick contacts, she did have a non productive cough but said she has a chronic cough.  She does not remember what happened immediately prior to being picked up by EMS and taken to ED. Currently she feels better, no chest pain, pleuritic pain, fever/chills, NVD.  Shortness of breath much improved.         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  Anxiety disorder  Major depressive disorder  Bronchitis  Cognitive communication deficit  Muscle weakness (generalized)  Insomnia  Hypokalemia  Edema  Essential (primary) hypertension  Unspecified diastolic (congestive) heart failure  Acute respiratory failure with hypercapnia  Preventative health care  Hypokalemia  Asthma, unspecified asthma severity, unspecified whether complicated, unspecified whether persistent  Acute respiratory failure with hypercapnia  No significant past surgical history  RESPIRATORY DISTRESS        MEDICATIONS  (STANDING):  ALBUTerol/ipratropium for Nebulization 3 milliLiter(s) Nebulizer every 6 hours  carvedilol 6.25 milliGRAM(s) Oral every 12 hours  enoxaparin Injectable 40 milliGRAM(s) SubCutaneous daily  lactated ringers Bolus 250 milliLiter(s) IV Bolus once  levoFLOXacin IVPB 750 milliGRAM(s) IV Intermittent once  levoFLOXacin IVPB      melatonin 5 milliGRAM(s) Oral at bedtime  methylPREDNISolone sodium succinate Injectable 40 milliGRAM(s) IV Push every 12 hours    MEDICATIONS  (PRN):      I&O's Summary    ICU Vital Signs Last 24 Hrs  T(C): 36.7 (05 Jul 2019 00:05), Max: 36.7 (04 Jul 2019 20:43)  T(F): 98.1 (05 Jul 2019 00:05), Max: 98.1 (05 Jul 2019 00:05)  HR: 95 (05 Jul 2019 00:05) (95 - 117)  BP: 127/71 (05 Jul 2019 00:05) (122/66 - 189/105)  BP(mean): --  ABP: --  ABP(mean): --  RR: 22 (05 Jul 2019 00:05) (22 - 30)  SpO2: 98% (05 Jul 2019 00:05) (92% - 98%)      PHYSICAL EXAM:  General: NAD conversant on bipap  CV: s1s2 systolic murmur, tachy 102  pulm: bilateral wheezing, louder on left.  GI: soft NTND   Extremities: lymphedema b/l LE  POCUS: IVC less then 1cm, RV nomal, LV hyperdynamic, probably LVH.       07-04    138  |  104  |  26<H>  ----------------------------<  250<H>  3.9   |  27  |  0.77    Ca    8.4<L>      04 Jul 2019 21:04  Mg     2.1     07-04    TPro  6.6  /  Alb  3.5  /  TBili  0.7  /  DBili  x   /  AST  16  /  ALT  20  /  AlkPhos  121<H>  07-04                          14.9   20.51 )-----------( 193      ( 04 Jul 2019 21:04 )             45.8     ABG - ( 04 Jul 2019 22:03 )  pH, Arterial: 7.41  pH, Blood: x     /  pCO2: 36    /  pO2: 56    / HCO3: 23    / Base Excess: -1.7  /  SaO2: 90                CARDIAC MARKERS ( 04 Jul 2019 21:04 )  .066 ng/mL / x     / x     / x     / x          86 yo F with history of asthma with chronic asthmatic bronchitis, anxiety, MDD, hypokalemia, LE edema, from [facility] for hypercapnic respiratory failure likely from asthma exacerbation, mental status and ABG improved with bipap, nebs and steroids.  has cough but CXR clear.    - Continue bipap, most recent ABG 7.41/36/56/23/60%.  lower IPAP from 18 to 16, increase fi02 from 40-60%  - Combivent q6h standing q2h PRN  - She was on prednisone 40mg daily from 6/26 to 7/1 for bronchitis at [facility].  Start Methylprednisolone 40 IV q12.    - WBC 20 with elevated PCT, pan culture, start levaquin. RVP panel  - NSTEMI- Lopez ..83 from .06 with ST depressions in II III AVF, start aspirin, metoprolol, heparin gtt, cardiology consult.  Keep K+ >4 Mg >2. continue to trend CE  - Echocardiogram  -IVC less then 1cm and hyperdynamic LV.  Hold lasix, give 250 cc bolus     Discussed with Dr. Dago ALEXANDER flow slows  Flange Fit: Freely moving nipple in the tunnel with some movement of the areola.  Today's evaluation indicates appropriate flange 24mm  Caution with Breast Massage: The word “Massage” means different things in the breastfeeding world. It is described and interpreted in so many different ways and unfortunately potentially harmful. The hands can be safe on a breast and  gentle to help in many ways but they also can be damaging when used in the wrong way.  Lymphatic drainage massage is appropriate,  open hand , lightly stroking only, and can be highly effective. The massage advice to knead , massage deeply , vibrate with marketed tools is  most concerning. Be gentle with this gland to not increase inflammation.   Storage (Acceptable guidelines for healthy term babies)  10 hours at room temp including your pieces, may rinse but not mandatory  8 days refrigerator, don't need  to refrigerate right away if using fresh pumped milk at the next feeding  Freezer 1 year  Deep freezer 2 years  American Academy or Pediatrics has said you may mix different temperature milks.   If baby drinks breast milk from a fresh or refrigerated bottle of breast milk,  you may return the unused portion to the refrigerator and use once at next feeding.   Increasing supply besides Galactogogue's and Pumping:  Warmth  Relaxation   Physical, auditory narratives  Childbirth relaxation Techniques  Acupuncture and acupressure  Gregorio Valerio at [facility] Acupuncture for chinese herbs  Nipple care:    May apply breastmilk  Moist-oily ointment after feeding/pumping, ie Lanolin nipple butter, coconut or olive oil, if desired/needed 2-3 times/day until nipples are healed  You do not need to wash this off before pumping or feeding the baby  You may want to remove baby from breast when active swallowing stops to avoid prolonged nipple compression  Hydrogels recommended  Medela Hydrogels, Lansinoh Soothies and Ameda comfort gels are all the same,  different companies  Silverettes There is no wound, no scabbing, silver can cause damage to the tissue and the milk in the cup can macerate the nipple.   Breast Care: engorgement    Breast rest - No Massage  Advil 800mg every 8 hours for 48 hours with food  Ice - 10 minutes  after feeding then every 30 minutes or as desired for repeating the ice. Don't put the ice directly on the skin.  May add Tylenol 1000mg every 8 hours for 48 hours in between the Advil dosing if needed for pain.  Answers to FAQs: https://www.[email or web address]/category/breastfeeding  Alcohol & Breastfeeding: What's your time-to-zero?  Cough & Cold Medications while Breastfeeding  Vitamin D Supplementation and Breastfeeding  Antidepressant Use While Breastfeeding: What should I know?  Breastfeeding, Caffeine, and Energy Drinks  Recommended resources:  Physicians guide to breastfeeding, must up to date and accurate information available on breastfeeding. https://physicianguidetobreastfeeding.org/  Connect with other mothers:  Facebook:   [place] Breastfeeds: https://www.[employer].com/[place].breastfeeds/  Well-Nourished Babies (Private group for questions and support): https://www.facebook.com/groups/629878546139662/  Breastfeeding [place] including opportunity to weight your baby and do before and after weights   Spoiler alert!  Parenting can be really hard. There is so much to learn when it comes to caring for small humans.  It can feel lonely and unsettling.  Our group, is a parenting and feeding support group that's all about feeding/growing laure.   Babies welcome.   Questions expected.   We will help you find your village!    Tuesdays 10am - 11am. Women's Health at [address] [place] [address], [address], 3rd floor conference room  Check your baby's weight, do a feeding and see how your baby is growing, visit with other mothers, plan on a walk or coffee date after group.  Please download Growth: Baby and Child for Apple or Child Growth Tracker for [employer]  if you would like to  document and follow your baby's growth curve.  Due to space limitations - limit strollers please (New c/section moms please use your stroller).  We would love to have dads stay, but moms won't breastfeed if there are men in the room, sorry.  The room is generally scheduled for another event following group.  Please take all diapers with you   ONLINE SUPPORT GROUPS  Postpartum [employer] (PSI) support groups are conducted using a [id number]-cc-[id number] support model and are not intended for those experiencing a mental health crisis. Groups are 90 minutes (1.5 hours) in length. The first ~30 minutes is providing information, education, and establishing group guidelines. The next ~60 minutes is “talk time,” in which group members share and talk with each other. Group members must be present for the group guidelines before joining in the discussion or “talk time.”     In Conclusion:   Managing breastfeeding and milk supply is very dynamic,can be a complex and intimate journey, and is not one size fits all. When obstacles present themselves, it takes confidence, persistence and support. The rights of the child include optimal nutrition and mothers need help to make informed decisions. You  and your baby have been screened for biological, psychological, and social risk factors that might interfere with achieving your goals.  Support is critical. We want the family thriving not just tolerating life. You are now the focus of our Breastfeeding Medicine team; we are here to support your decisions and vision.     Follow up   Please call [phone] our voicemail line or the front office at [phone] to scheduled your next appointment.  Family is encouraged to e-mail (for Xavier, may use [email or web address]) or [email or web address]  to update how the plan is working.     A total of 75 minutes, not including infant assessment time,  was spent preparing to see the patient, obtaining and reviewing separately obtained  history, performing a medically appropriate examination and evaluation, counseling and educating the family, communicating with other health care professionals, documenting clinical information in the electronic health record, independently interpreting weighted feeds and infant growth results, communicating these results to the family and care coordination as detailed in the above note.       CATIE Leal.

## 2024-03-15 RX ORDER — IPRATROPIUM/ALBUTEROL SULFATE 18-103MCG
0 AEROSOL WITH ADAPTER (GRAM) INHALATION
Qty: 0 | Refills: 0 | DISCHARGE

## 2024-03-15 RX ORDER — CARVEDILOL PHOSPHATE 80 MG/1
1 CAPSULE, EXTENDED RELEASE ORAL
Qty: 0 | Refills: 0 | DISCHARGE

## 2024-03-15 RX ORDER — FUROSEMIDE 40 MG
1 TABLET ORAL
Qty: 0 | Refills: 0 | DISCHARGE

## 2024-03-15 RX ORDER — POTASSIUM CHLORIDE 20 MEQ
0 PACKET (EA) ORAL
Qty: 0 | Refills: 0 | DISCHARGE

## 2024-03-15 RX ORDER — LANOLIN ALCOHOL/MO/W.PET/CERES
1 CREAM (GRAM) TOPICAL
Qty: 0 | Refills: 0 | DISCHARGE

## 2024-06-12 NOTE — ED ADULT NURSE NOTE - NS ED NURSE PRESS ULCER LOCATION 11
[de-identified] : 41 yo female with PMHx of gastritis, left benign ovarian cyst (s/p ovarian conservation resection), adenomyosis, uterine fibroids, menorrhagia presents for evaluation of chronic blood loss anemia. Patient has had heavy menses for years with intolerance to oral iron (nausea, vomiting, constipation) undergoing IV iron formulation.  She first had IV iron 2013 in [place] with appropriate Hb response. Additionally she presented in 2017 again for IV iron with Hb ~7 that responded to 13.9.  LMP July 1, 2018. On July 2 2018 she had severe menstrual bleeding that caused her to be dizzy and presented to [facility] ER. There her Hb was 8.6, down from 9.3 in June. She did not receive any iron or blood transfusion in the ED. Denies adverse reactions to iron infusion.\par \par Has close follow up with her GYN and failed multiple OCPs and IUD in an attempt control the bleeding. OCPs gave her acne so she does not want to try that again. Currently, goes through 6-7 pads a day for the first 3 days, and period lasts for about 6 days. She was offered a hysterectomy, currently patient is considering.\par \par Visit Hx:\par 7/16/19: Patient presents today for follow-up from ED visit for menstrual cycle related chronic blood loss anemia with symptoms of fatigue, intermittent dizziness and mild SOB on exertion. She denies chest pain on exertion, or other sources of bleeding including GI. She denies issues with bleeding when she gets cuts and scrapes, and denies bruising.\par She is requesting IV iron as she is unable to tolerate oral supplementation. She is planning a trip to [place] tomorrow for 2 weeks with her daughters that she cannot change. She is requesting IV Iron today. \par \par 2/28/2019: Patient presents after about 7 months since her IV iron infusion. She continues to have heavy periods and has been experiencing weakness and dizziness again with intermittent headaches similar to when she was anemic in the past. She denies SOB with exertion. She did not want a hysterectomy at this time as she may be looking into fertility options since she was recently remarried.\par \par 4/11/19: She is feeling well today and back to her healthy baseline. Her counts have fully recovered. She continues to have heavy periods and will consider further gyn eval and possible hysterectomy in the future.\par \par 8/22/2019: Patient complains of recurrent dizziness and losing her hair again. She also reports fatigue / weakness. She denies pica or shortness of breath. LMP 8/11/19 (heavy bleeding experienced x 5 days). She also complains of tenderness in her right neck where she feels a right swollen node. This was present last visit and has not significantly changed in size. Patient will be evaluated for an additional treatment with Injectafer. She is also being evaluated for endometrial thickening and myomatous uterus and hopes to have a D&C procedure in the near future. She is still discussion fertility options and has not yet decided on hysterectomy.\par \par 9/26/2019: Ms Santos presents for follow-up today. She has completed 2/3 doses of venofer. Injectafer was too expensive due to insurance lapse so she agreed to venofer. After the 1st dose of venofer she had vomiting and retching for 24 hours after the infusion. After the 2nd dose (on 9/17/19) of venofer she started developing chest pain 7/10 around her sternum that was worse with touch. She did not call a doctor, felt it was not internal pain and has been feeling daily improvement of this pain, which she states is felt with bending over and touching her sternum now at 2/10. She skipped her 3rd dose. Today she denies nausea, and complains of enlargement of her right neck lump that was examined last visit. She is planning to go for endometrial polyp removal (hysteroscopy and D&C) after medical clearance, she has already seen pulm for clearance. LMP 9/7, lasted 5 days.\par \par On 12/2/19 patient was new to me. She reports that the lymphadenopathy in her neck feels like it is not necessarily "growing" but it is "joining together". She reports that she feels fatigued but no shortness of breath or anything like that. No pain in the area of the lymph nodes. She feels frustrated that she has continued menstrual bleeding and she had a procedure recently. She cannot tolerate the PO iron according to her and she has been on IV iron in the past. Pending IR appointment Wednesday to evaluate for possible biopsy of lymph node. Of note, recently she overcame the flu but still has mild residual cough. She denied any weight loss, she said she was getting some mild night sweats. \par \par Due to continued iron deificiency and menstrual blood loss, patient had Injectafer x 2 in March 2020. Biopsy of lymph node showed changes consistent with a reactive lymph node on May 14, 2020. [de-identified] : Patient here for acute visit today as she reports feeling more fatigued and just "not feeling well". She denied any dyspnea or chest pain or palpitations. She still has very heavy menstrual losses and is planning on procedure, possibly embolization. Showed up today asking for IV iron, she cannot tolerate PO iron.  Render Post-Care Instructions In Note?: yes Duration Of Freeze Thaw-Cycle (Seconds): 5 Post-Care Instructions: I reviewed with the patient in detail post-care instructions. Patient is to wear sunprotection, and avoid picking at any of the treated lesions. Pt may apply Vaseline to crusted or scabbing areas. Consent: The patient's consent was obtained including but not limited to risks of crusting, scabbing, blistering, scarring, darker or lighter pigmentary change, recurrence, incomplete removal and infection. Detail Level: Detailed Number Of Freeze-Thaw Cycles: 2 freeze-thaw cycles Application Tool (Optional): Liquid Nitrogen Sprayer sacrum & b/l heels

## 2025-02-13 NOTE — PROGRESS NOTE ADULT - PROBLEM SELECTOR PLAN 1
FAMILY HISTORY:  Uncle  Still living? No  Family history of brain cancer, Age at diagnosis: Age Unknown     Lovenox held due to h/o abdominal sheath hematoma  IVC filter placed

## 2025-05-12 NOTE — ED ADULT NURSE NOTE - PRO INTERPRETER NEED 2
Psych IOP  Group note  Observations:    Engaged in Activity / Process and Self -disclosed: Yes  Applies Topic to self: Yes  Able to give Constructive Feedback: Yes  Affect: blunted and bright  Degree of Insightful Thinking 6  Notes:  Pt was active in the group process interacting with peers and sharing of self. Pt described stressor of yesterday being the first Mother's day after the death of own mother. Pt also shared of the ongoing hurt of sister changing mother's will about the family property. Pt described support for peers and offered feedback. Pt voiced feeling gratitude for wife and children.       ROVERTO Waters     English

## 2025-06-23 NOTE — ED ADULT NURSE NOTE - NS ED NURSE AMBULANCES2
Subjective   Jess Razo is a 63 y.o. female.     History of Present Illness   Jess Razo 63 y.o. female presents today for urgent care follow up.  she was treated 6-15-25- for urinary discomfort.  She is still having chronic lower abd pain. She is having urinary frequency.--But she is drinking more water some bloating.   Still has loose BM since her colon resection.   No blood  She reports relief of abd pain with voiding. ---pain returns with sitting. --- She reports that after she voided in the last week she could walk around and not have any abdominal pain until she sat down again.  And then voiding relieves the abdominal pain pressure bloating....  Recent pork chops made her lower abdominal pain worse    She is having abd pain---pressure-----more constant today.  Sore to palp over lower abd.pain worse  The Macrobid was completed over the weekend and did make her abdominal pain worse.    .  I reviewed all of the labs and diagnostic testing and noted: Urine culture and did have E. coli and was sensitive to the nitrofurantoin.  The patient's medications were not changed:  Current outpatient and discharge medications have been reconciled for the patient.  Reviewed by: Olamide Cobos PA-C    she does not have a follow up appointment with a specialist:  I also want to note her asthma inhalers are working well and asthma is controlled  Saw [facility] cardio DR Howe -----following up surveillance of essential hypertension, mixed hyperlipidemia and cardiomyopathy, inappropriate sinus tachycardia  The cardiologist increased her valsartan dose at this visit to 80 mg daily and noted to monitor her potassium.  Continue to her on digoxin and Zebeta 10 mg daily and noted no episodes of tachycardia since discontinuation of diltiazem also to continue atorvastatin 40 mg nightly for treatment of her hyperlipidemia---  F/u 6 mos    Has hx HARTLEY   Also note 20-pack-year history smoking stopped 2019   She was seen by [facility]      Also sees [facility] noting visit from 6-19-24 DR Arnold  1. (Stage IIA)[id number] intermediate grade, infiltrating ductal carcinoma, estrogen and progesterone receptor positive, HER2/salome negative. The patient underwent a breast conserving surgical procedure November 2006.Four cycles of doxorubicin/cyclophosphamide followed by four cycles of paclitaxel 15 March 2007. She completed five years of tamoxifen in May 2012. At that time she was switched to letrozole. Trial of exemestane started November 22, 2013 due to persistent hair thinning. Tamoxifen and substituted per patient request in May 2014. Completed 2017.       EKG 1/1/25---  Sinus rhythm  Prolonged UT interval  Minimal ST depression, diffuse leads   Had DEXA 10/12/24--osteopenia.  IMPRESSION:  1.  Osteopenia. No statistically significant interval change.  2.  The 10-year FRAX (World Health Organization) fracture risk for a  major osteoporotic fracture is 19% and for a hip fracture is 2.4%.  ---wait on Rx    On Prozac for anxiety and depression--increased to 40 mg at last visit.------ I do want a note at her visit today she has been experiencing breakthrough anxiety and was doing well on the 40 mg until about a month ago and we have never tried 16 mg and were discussing this today    Prior Notes  Jardiance and Mounjaro worked      Sees [facility] for inappropriate sinus tachycardia and her mild cardiomyopathy just had visit 1-30-24 no meds were changed.     Liver labs negative for genetic cause per work-up with Dr. Valencia GI specialist---fatty liver!!  CT abdomen and pelvis on 1/16/2025 while she was inpatient noted normal liver--- incidental gallstones but no inflammation     She still getting colonoscopy every 24 months and due to prior polyp with Dr. Mars  Last visit with Dr. Mars was 3/4/2025 noting her history of status post laparoscopic right colon resection from 1/7/2025 in complication of postop ileus    Mole removed by derm right  thigh--not cancer. ----The APRN with Dr. Mariee.  The following portions of the patient's history were reviewed and updated as appropriate: allergies, current medications, past family history, past medical history, past social history, past surgical history, and problem list.    Review of Systems   Constitutional:  Negative for diaphoresis.   HENT:  Negative for nosebleeds and trouble swallowing.    Eyes:  Negative for blurred vision and visual disturbance.   Respiratory:  Negative for choking.    Gastrointestinal:  Positive for abdominal pain. Negative for blood in stool.   Allergic/Immunologic: Negative for immunocompromised state.   Neurological:  Negative for facial asymmetry and speech difficulty.   Psychiatric/Behavioral:  Negative for self-injury and suicidal ideas.        Objective   Physical Exam  Vitals and nursing note reviewed.   Constitutional:       General: She is not in acute distress.     Appearance: She is well-developed. She is not ill-appearing or toxic-appearing.   HENT:      Head: Normocephalic.      Right Ear: External ear normal.      Left Ear: External ear normal.      Nose: Nose normal.      Mouth/Throat:      Pharynx: Oropharynx is clear.   Eyes:      General: No scleral icterus.     Conjunctiva/sclera: Conjunctivae normal.      Pupils: Pupils are equal, round, and reactive to light.   Neck:      Thyroid: No thyromegaly.   Cardiovascular:      Rate and Rhythm: Normal rate and regular rhythm.      Heart sounds: Normal heart sounds. No murmur heard.  Pulmonary:      Effort: Pulmonary effort is normal. No respiratory distress.      Breath sounds: Normal breath sounds.   Abdominal:      General: Bowel sounds are normal. There is no distension.      Palpations: Abdomen is soft.      Tenderness: There is abdominal tenderness.      Comments: Mild discomfort with palpating suprapubic area but no guarding   Musculoskeletal:         General: No deformity. Normal range of motion.      Cervical back:  Normal range of motion and neck supple.   Skin:     General: Skin is warm and dry.      Findings: No rash.   Neurological:      General: No focal deficit present.      Mental Status: She is alert and oriented to person, place, and time. Mental status is at baseline.   Psychiatric:         Mood and Affect: Mood normal.         Behavior: Behavior normal.         Thought Content: Thought content normal.         Judgment: Judgment normal.           Assessment & Plan   Diagnoses and all orders for this visit:    1. Lower abdominal pain (Primary)  -     POCT urinalysis dipstick, automated  -     Urinalysis With Microscopic - Urine, Clean Catch  -     Urine Culture - Urine, Urine, Clean Catch      Concerned that she is having increased anxiety also treated for depression and we will raise dose of Prozac to 60 mg daily  Followed by [facility]ology for inappropriate sinus tachycardia, cardiomyopathy, hypertension  Has been to GI Dr. Valencia in the past for elevated LFTs and fatty liver workup and was noted that had labs negative for genetic causes of LFTs  I want her to take the metformin after having a full meal--- this can cause loose stools  Lab Results   Component Value Date    HGBA1C 6.40 (H) 12/31/2024     Lab Results   Component Value Date    CHLPL 115 08/12/2024    TRIG 105 01/18/2025    HDL 34 (L) 08/12/2024    LDL 35 08/12/2024         She is due to see me for routine med refills and follow-up in August  I will send her urine for micro and culture to make sure she does not have secondary infection  She does have tenderness over her bladder and note that when she voids the bloating pressure pain over her lower abdomen is relieved... And then returns after she sits down --- feel sore in the suprapubic abdominal area  I do want to consider getting CT of abdomen and pelvis with IV contrast if this continues  I will have her try Pyridium over the next few days and see if that alleviates what I am concerned about is  bladder pain----I also suspect she may be having interstitial cystitis---refer to Riley Duran     I want her to look up interstitial cystitis online in the diet that is advised  ER if worse    She has not been taking the metformin with food I do want her to hold metformin for a few days and see if that is associated with this abdominal discomfort at all and then resume at 1 tab but he needs to eat first and then take     [facility]

## 2025-07-16 NOTE — ED ADULT TRIAGE NOTE - CHIEF COMPLAINT QUOTE
Discharge Planning Assessment  [facility]     Patient Name: Charlette Rai  MRN: [id number]  Today's Date: 7/16/2025    Admit Date: 7/9/2025     Discharge Plan       Row Name 07/16/25 1202       Plan    Plan [facility] is able to offer pt a bed. [facility] PA notified to begin pre-cert. Pt has a chair time for HD at [facility] [place], T,T,S schedule at 10:00AM, arrival of 9:40AM. Facility notified.    Patient/Family in Agreement with Plan yes                SHAI Yeboah     Sent in for b/l cellulitis of lower extremities